# Patient Record
Sex: MALE | Race: OTHER | Employment: OTHER | URBAN - METROPOLITAN AREA
[De-identification: names, ages, dates, MRNs, and addresses within clinical notes are randomized per-mention and may not be internally consistent; named-entity substitution may affect disease eponyms.]

---

## 2017-02-13 ENCOUNTER — OFFICE VISIT (OUTPATIENT)
Dept: FAMILY MEDICINE CLINIC | Age: 52
End: 2017-02-13

## 2017-02-13 VITALS
BODY MASS INDEX: 32.33 KG/M2 | WEIGHT: 201.2 LBS | SYSTOLIC BLOOD PRESSURE: 118 MMHG | HEART RATE: 68 BPM | RESPIRATION RATE: 18 BRPM | DIASTOLIC BLOOD PRESSURE: 72 MMHG | HEIGHT: 66 IN

## 2017-02-13 DIAGNOSIS — F17.210 CIGARETTE SMOKER: ICD-10-CM

## 2017-02-13 DIAGNOSIS — E78.00 PURE HYPERCHOLESTEROLEMIA: ICD-10-CM

## 2017-02-13 DIAGNOSIS — I10 ESSENTIAL HYPERTENSION: ICD-10-CM

## 2017-02-13 LAB
ANION GAP SERPL CALCULATED.3IONS-SCNC: 15 MMOL/L (ref 3–16)
BUN BLDV-MCNC: 17 MG/DL (ref 7–20)
CALCIUM SERPL-MCNC: 9.6 MG/DL (ref 8.3–10.6)
CHLORIDE BLD-SCNC: 101 MMOL/L (ref 99–110)
CO2: 25 MMOL/L (ref 21–32)
CREAT SERPL-MCNC: 0.8 MG/DL (ref 0.9–1.3)
GFR AFRICAN AMERICAN: >60
GFR NON-AFRICAN AMERICAN: >60
GLUCOSE BLD-MCNC: 172 MG/DL (ref 70–99)
POTASSIUM SERPL-SCNC: 4.1 MMOL/L (ref 3.5–5.1)
SODIUM BLD-SCNC: 141 MMOL/L (ref 136–145)

## 2017-02-13 PROCEDURE — 99214 OFFICE O/P EST MOD 30 MIN: CPT | Performed by: INTERNAL MEDICINE

## 2017-02-13 PROCEDURE — 36415 COLL VENOUS BLD VENIPUNCTURE: CPT | Performed by: INTERNAL MEDICINE

## 2017-02-13 RX ORDER — FENOFIBRATE 160 MG/1
160 TABLET ORAL DAILY
Qty: 30 TABLET | Refills: 5 | Status: SHIPPED | OUTPATIENT
Start: 2017-02-13 | End: 2017-06-09 | Stop reason: SDUPTHER

## 2017-02-13 RX ORDER — LISINOPRIL 5 MG/1
TABLET ORAL
Qty: 30 TABLET | Refills: 5 | Status: SHIPPED | OUTPATIENT
Start: 2017-02-13 | End: 2017-06-09 | Stop reason: SDUPTHER

## 2017-02-13 RX ORDER — METOPROLOL TARTRATE 50 MG/1
50 TABLET, FILM COATED ORAL 2 TIMES DAILY
Qty: 60 TABLET | Refills: 5 | Status: SHIPPED | OUTPATIENT
Start: 2017-02-13 | End: 2017-03-27 | Stop reason: SDUPTHER

## 2017-02-13 RX ORDER — GABAPENTIN 400 MG/1
CAPSULE ORAL
Qty: 120 CAPSULE | Refills: 5 | Status: SHIPPED | OUTPATIENT
Start: 2017-02-13 | End: 2017-05-16 | Stop reason: SDUPTHER

## 2017-02-13 RX ORDER — ALBUTEROL SULFATE 90 UG/1
2 AEROSOL, METERED RESPIRATORY (INHALATION) EVERY 6 HOURS PRN
Qty: 1 INHALER | Refills: 3 | Status: SHIPPED | OUTPATIENT
Start: 2017-02-13 | End: 2017-06-13 | Stop reason: SDUPTHER

## 2017-02-13 ASSESSMENT — ENCOUNTER SYMPTOMS
RESPIRATORY NEGATIVE: 1
GASTROINTESTINAL NEGATIVE: 1
ALLERGIC/IMMUNOLOGIC NEGATIVE: 1

## 2017-02-14 LAB
ESTIMATED AVERAGE GLUCOSE: 182.9 MG/DL
HBA1C MFR BLD: 8 %

## 2017-02-16 RX ORDER — PIOGLITAZONEHYDROCHLORIDE 30 MG/1
30 TABLET ORAL DAILY
Qty: 30 TABLET | Refills: 3 | Status: SHIPPED | OUTPATIENT
Start: 2017-02-16 | End: 2017-06-09 | Stop reason: SDUPTHER

## 2017-03-27 RX ORDER — LANCETS 30 GAUGE
EACH MISCELLANEOUS
Qty: 100 EACH | Refills: 3 | Status: SHIPPED | OUTPATIENT
Start: 2017-03-27 | End: 2017-06-13 | Stop reason: SDUPTHER

## 2017-03-27 RX ORDER — METOPROLOL TARTRATE 50 MG/1
50 TABLET, FILM COATED ORAL 2 TIMES DAILY
Qty: 60 TABLET | Refills: 5 | Status: SHIPPED | OUTPATIENT
Start: 2017-03-27 | End: 2017-06-09 | Stop reason: SDUPTHER

## 2017-04-17 RX ORDER — FLUTICASONE PROPIONATE 50 MCG
1 SPRAY, SUSPENSION (ML) NASAL DAILY
Qty: 1 BOTTLE | Refills: 3 | Status: SHIPPED | OUTPATIENT
Start: 2017-04-17 | End: 2017-06-13 | Stop reason: SDUPTHER

## 2017-05-16 ENCOUNTER — OFFICE VISIT (OUTPATIENT)
Dept: FAMILY MEDICINE CLINIC | Age: 52
End: 2017-05-16

## 2017-05-16 VITALS
HEART RATE: 68 BPM | DIASTOLIC BLOOD PRESSURE: 74 MMHG | HEIGHT: 60 IN | SYSTOLIC BLOOD PRESSURE: 112 MMHG | WEIGHT: 212.6 LBS | RESPIRATION RATE: 16 BRPM | BODY MASS INDEX: 41.74 KG/M2

## 2017-05-16 DIAGNOSIS — E66.01 MORBID OBESITY DUE TO EXCESS CALORIES (HCC): ICD-10-CM

## 2017-05-16 DIAGNOSIS — F17.210 CIGARETTE SMOKER: ICD-10-CM

## 2017-05-16 DIAGNOSIS — I10 ESSENTIAL HYPERTENSION: ICD-10-CM

## 2017-05-16 DIAGNOSIS — G89.29 CHRONIC ABDOMINAL PAIN: ICD-10-CM

## 2017-05-16 DIAGNOSIS — R10.9 CHRONIC ABDOMINAL PAIN: ICD-10-CM

## 2017-05-16 DIAGNOSIS — J45.41 MODERATE PERSISTENT ASTHMA WITH ACUTE EXACERBATION: ICD-10-CM

## 2017-05-16 DIAGNOSIS — E78.00 PURE HYPERCHOLESTEROLEMIA: ICD-10-CM

## 2017-05-16 LAB
ALBUMIN SERPL-MCNC: 4.4 G/DL (ref 3.4–5)
ALP BLD-CCNC: 62 U/L (ref 40–129)
ALT SERPL-CCNC: 17 U/L (ref 10–40)
ANION GAP SERPL CALCULATED.3IONS-SCNC: 16 MMOL/L (ref 3–16)
AST SERPL-CCNC: 15 U/L (ref 15–37)
BASOPHILS ABSOLUTE: 0.1 K/UL (ref 0–0.2)
BASOPHILS RELATIVE PERCENT: 0.8 %
BILIRUB SERPL-MCNC: 0.5 MG/DL (ref 0–1)
BILIRUBIN DIRECT: <0.2 MG/DL (ref 0–0.3)
BILIRUBIN, INDIRECT: NORMAL MG/DL (ref 0–1)
BUN BLDV-MCNC: 18 MG/DL (ref 7–20)
C-REACTIVE PROTEIN: 2.3 MG/L (ref 0–5.1)
CALCIUM SERPL-MCNC: 9.4 MG/DL (ref 8.3–10.6)
CHLORIDE BLD-SCNC: 99 MMOL/L (ref 99–110)
CO2: 28 MMOL/L (ref 21–32)
CREAT SERPL-MCNC: 0.9 MG/DL (ref 0.9–1.3)
EOSINOPHILS ABSOLUTE: 0.1 K/UL (ref 0–0.6)
EOSINOPHILS RELATIVE PERCENT: 1.5 %
GFR AFRICAN AMERICAN: >60
GFR NON-AFRICAN AMERICAN: >60
GLUCOSE BLD-MCNC: 62 MG/DL (ref 70–99)
HCT VFR BLD CALC: 46.5 % (ref 40.5–52.5)
HEMOGLOBIN: 15.3 G/DL (ref 13.5–17.5)
LYMPHOCYTES ABSOLUTE: 3 K/UL (ref 1–5.1)
LYMPHOCYTES RELATIVE PERCENT: 31 %
MCH RBC QN AUTO: 29.9 PG (ref 26–34)
MCHC RBC AUTO-ENTMCNC: 32.9 G/DL (ref 31–36)
MCV RBC AUTO: 90.8 FL (ref 80–100)
MONOCYTES ABSOLUTE: 0.5 K/UL (ref 0–1.3)
MONOCYTES RELATIVE PERCENT: 5.4 %
NEUTROPHILS ABSOLUTE: 6 K/UL (ref 1.7–7.7)
NEUTROPHILS RELATIVE PERCENT: 61.3 %
PDW BLD-RTO: 12.4 % (ref 12.4–15.4)
PLATELET # BLD: 215 K/UL (ref 135–450)
PMV BLD AUTO: 10.1 FL (ref 5–10.5)
POTASSIUM SERPL-SCNC: 4.2 MMOL/L (ref 3.5–5.1)
RBC # BLD: 5.12 M/UL (ref 4.2–5.9)
SEDIMENTATION RATE, ERYTHROCYTE: 0 MM/HR (ref 0–20)
SODIUM BLD-SCNC: 143 MMOL/L (ref 136–145)
TOTAL PROTEIN: 6.6 G/DL (ref 6.4–8.2)
WBC # BLD: 9.8 K/UL (ref 4–11)

## 2017-05-16 PROCEDURE — 36415 COLL VENOUS BLD VENIPUNCTURE: CPT | Performed by: INTERNAL MEDICINE

## 2017-05-16 PROCEDURE — 99214 OFFICE O/P EST MOD 30 MIN: CPT | Performed by: INTERNAL MEDICINE

## 2017-05-16 RX ORDER — INSULIN GLARGINE 100 [IU]/ML
INJECTION, SOLUTION SUBCUTANEOUS
Qty: 15 PEN | Refills: 2 | Status: SHIPPED | OUTPATIENT
Start: 2017-05-16 | End: 2017-06-13 | Stop reason: SDUPTHER

## 2017-05-16 RX ORDER — HYDROCODONE BITARTRATE AND ACETAMINOPHEN 5; 325 MG/1; MG/1
TABLET ORAL
COMMUNITY
Start: 2017-05-06 | End: 2017-06-08 | Stop reason: ALTCHOICE

## 2017-05-16 RX ORDER — BUDESONIDE AND FORMOTEROL FUMARATE DIHYDRATE 80; 4.5 UG/1; UG/1
2 AEROSOL RESPIRATORY (INHALATION) 2 TIMES DAILY
Qty: 1 INHALER | Refills: 3 | Status: SHIPPED | OUTPATIENT
Start: 2017-05-16 | End: 2017-06-09 | Stop reason: SDUPTHER

## 2017-05-16 RX ORDER — GABAPENTIN 400 MG/1
CAPSULE ORAL
Qty: 120 CAPSULE | Refills: 5 | Status: SHIPPED | OUTPATIENT
Start: 2017-05-16 | End: 2017-06-09 | Stop reason: SDUPTHER

## 2017-05-16 RX ORDER — METHOCARBAMOL 500 MG/1
TABLET, FILM COATED ORAL
COMMUNITY
Start: 2017-05-06 | End: 2017-06-13 | Stop reason: ALTCHOICE

## 2017-05-16 ASSESSMENT — PATIENT HEALTH QUESTIONNAIRE - PHQ9
1. LITTLE INTEREST OR PLEASURE IN DOING THINGS: 0
2. FEELING DOWN, DEPRESSED OR HOPELESS: 1
SUM OF ALL RESPONSES TO PHQ9 QUESTIONS 1 & 2: 1
SUM OF ALL RESPONSES TO PHQ QUESTIONS 1-9: 1

## 2017-05-16 ASSESSMENT — ENCOUNTER SYMPTOMS
DIARRHEA: 0
ABDOMINAL DISTENTION: 0
ANAL BLEEDING: 0
CONSTIPATION: 0
BLOOD IN STOOL: 0
ABDOMINAL PAIN: 1
RESPIRATORY NEGATIVE: 1
VOMITING: 0
RECTAL PAIN: 0
ALLERGIC/IMMUNOLOGIC NEGATIVE: 1
NAUSEA: 0

## 2017-05-17 ENCOUNTER — HOSPITAL ENCOUNTER (OUTPATIENT)
Dept: ULTRASOUND IMAGING | Age: 52
Discharge: HOME | End: 2017-05-17
Attending: INTERNAL MEDICINE | Admitting: INTERNAL MEDICINE

## 2017-05-17 DIAGNOSIS — R10.9 CHRONIC ABDOMINAL PAIN: ICD-10-CM

## 2017-05-17 DIAGNOSIS — G89.29 CHRONIC ABDOMINAL PAIN: ICD-10-CM

## 2017-05-17 DIAGNOSIS — R10.9 ABDOMINAL PAIN: ICD-10-CM

## 2017-05-17 LAB
ESTIMATED AVERAGE GLUCOSE: 148.5 MG/DL
HBA1C MFR BLD: 6.8 %

## 2017-05-18 ENCOUNTER — TELEPHONE (OUTPATIENT)
Dept: FAMILY MEDICINE CLINIC | Age: 52
End: 2017-05-18

## 2017-05-18 DIAGNOSIS — R10.9 CHRONIC ABDOMINAL PAIN: Primary | ICD-10-CM

## 2017-05-18 DIAGNOSIS — G89.29 CHRONIC ABDOMINAL PAIN: Primary | ICD-10-CM

## 2017-06-08 ENCOUNTER — HOSPITAL ENCOUNTER (OUTPATIENT)
Dept: SURGERY | Age: 52
Discharge: HOME | End: 2017-06-08
Attending: INTERNAL MEDICINE | Admitting: INTERNAL MEDICINE

## 2017-06-08 VITALS
TEMPERATURE: 97.9 F | OXYGEN SATURATION: 96 % | SYSTOLIC BLOOD PRESSURE: 125 MMHG | HEART RATE: 81 BPM | WEIGHT: 200 LBS | RESPIRATION RATE: 16 BRPM | HEIGHT: 62 IN | DIASTOLIC BLOOD PRESSURE: 47 MMHG | BODY MASS INDEX: 36.8 KG/M2

## 2017-06-08 LAB
GLUCOSE BLD-MCNC: 94 MG/DL (ref 70–99)
PERFORMED ON: NORMAL

## 2017-06-08 RX ORDER — LIDOCAINE HYDROCHLORIDE 10 MG/ML
0.1 INJECTION, SOLUTION EPIDURAL; INFILTRATION; INTRACAUDAL; PERINEURAL
Status: ACTIVE | OUTPATIENT
Start: 2017-06-08 | End: 2017-06-08

## 2017-06-08 RX ORDER — SODIUM CHLORIDE, SODIUM LACTATE, POTASSIUM CHLORIDE, CALCIUM CHLORIDE 600; 310; 30; 20 MG/100ML; MG/100ML; MG/100ML; MG/100ML
INJECTION, SOLUTION INTRAVENOUS ONCE
Status: COMPLETED | OUTPATIENT
Start: 2017-06-08 | End: 2017-06-08

## 2017-06-08 RX ADMIN — SODIUM CHLORIDE, SODIUM LACTATE, POTASSIUM CHLORIDE, CALCIUM CHLORIDE: 600; 310; 30; 20 INJECTION, SOLUTION INTRAVENOUS at 08:40

## 2017-06-08 ASSESSMENT — PAIN DESCRIPTION - DESCRIPTORS: DESCRIPTORS: SHARP

## 2017-06-08 ASSESSMENT — PAIN - FUNCTIONAL ASSESSMENT: PAIN_FUNCTIONAL_ASSESSMENT: 0-10

## 2017-06-08 ASSESSMENT — PAIN SCALES - GENERAL
PAINLEVEL_OUTOF10: 0
PAINLEVEL_OUTOF10: 0

## 2017-06-09 RX ORDER — FENOFIBRATE 160 MG/1
160 TABLET ORAL DAILY
Qty: 90 TABLET | Refills: 1 | Status: SHIPPED | OUTPATIENT
Start: 2017-06-09 | End: 2017-11-13 | Stop reason: SDUPTHER

## 2017-06-09 RX ORDER — GABAPENTIN 400 MG/1
CAPSULE ORAL
Qty: 360 CAPSULE | Refills: 1 | Status: SHIPPED | OUTPATIENT
Start: 2017-06-09 | End: 2017-11-13 | Stop reason: SDUPTHER

## 2017-06-09 RX ORDER — METOPROLOL TARTRATE 50 MG/1
50 TABLET, FILM COATED ORAL 2 TIMES DAILY
Qty: 180 TABLET | Refills: 1 | Status: SHIPPED | OUTPATIENT
Start: 2017-06-09 | End: 2017-11-13 | Stop reason: SDUPTHER

## 2017-06-09 RX ORDER — LISINOPRIL 5 MG/1
TABLET ORAL
Qty: 90 TABLET | Refills: 1 | Status: SHIPPED | OUTPATIENT
Start: 2017-06-09 | End: 2017-11-13 | Stop reason: SDUPTHER

## 2017-06-09 RX ORDER — BUDESONIDE AND FORMOTEROL FUMARATE DIHYDRATE 80; 4.5 UG/1; UG/1
2 AEROSOL RESPIRATORY (INHALATION) 2 TIMES DAILY
Qty: 3 INHALER | Refills: 1 | Status: SHIPPED | OUTPATIENT
Start: 2017-06-09 | End: 2017-09-19 | Stop reason: SDUPTHER

## 2017-06-09 RX ORDER — PIOGLITAZONEHYDROCHLORIDE 30 MG/1
30 TABLET ORAL DAILY
Qty: 90 TABLET | Refills: 1 | Status: SHIPPED | OUTPATIENT
Start: 2017-06-09 | End: 2017-11-13 | Stop reason: SDUPTHER

## 2017-06-13 ENCOUNTER — OFFICE VISIT (OUTPATIENT)
Dept: FAMILY MEDICINE CLINIC | Age: 52
End: 2017-06-13

## 2017-06-13 VITALS
HEIGHT: 62 IN | OXYGEN SATURATION: 97 % | BODY MASS INDEX: 38.57 KG/M2 | RESPIRATION RATE: 18 BRPM | SYSTOLIC BLOOD PRESSURE: 118 MMHG | WEIGHT: 209.6 LBS | HEART RATE: 74 BPM | DIASTOLIC BLOOD PRESSURE: 68 MMHG

## 2017-06-13 DIAGNOSIS — G89.29 CHRONIC ABDOMINAL PAIN: ICD-10-CM

## 2017-06-13 DIAGNOSIS — R10.9 CHRONIC ABDOMINAL PAIN: ICD-10-CM

## 2017-06-13 DIAGNOSIS — I10 ESSENTIAL HYPERTENSION: ICD-10-CM

## 2017-06-13 PROCEDURE — 99213 OFFICE O/P EST LOW 20 MIN: CPT | Performed by: INTERNAL MEDICINE

## 2017-06-13 RX ORDER — BUDESONIDE 0.5 MG/2ML
1 INHALANT ORAL 2 TIMES DAILY
Qty: 180 AMPULE | Refills: 3 | Status: SHIPPED | OUTPATIENT
Start: 2017-06-13 | End: 2017-07-21 | Stop reason: SDUPTHER

## 2017-06-13 RX ORDER — FLUTICASONE PROPIONATE 50 MCG
1 SPRAY, SUSPENSION (ML) NASAL DAILY
Qty: 3 BOTTLE | Refills: 3 | Status: SHIPPED | OUTPATIENT
Start: 2017-06-13 | End: 2017-08-23 | Stop reason: SDUPTHER

## 2017-06-13 RX ORDER — LANCETS 30 GAUGE
EACH MISCELLANEOUS
Qty: 100 EACH | Refills: 3 | Status: SHIPPED | OUTPATIENT
Start: 2017-06-13 | End: 2017-09-12 | Stop reason: SDUPTHER

## 2017-06-13 RX ORDER — ALBUTEROL SULFATE 90 UG/1
2 AEROSOL, METERED RESPIRATORY (INHALATION) EVERY 6 HOURS PRN
Qty: 1 INHALER | Refills: 3 | Status: SHIPPED | OUTPATIENT
Start: 2017-06-13 | End: 2017-09-12 | Stop reason: SDUPTHER

## 2017-06-13 ASSESSMENT — ENCOUNTER SYMPTOMS
RECTAL PAIN: 0
ABDOMINAL DISTENTION: 0
DIARRHEA: 0
BLOOD IN STOOL: 0
ABDOMINAL PAIN: 1
NAUSEA: 0
ANAL BLEEDING: 0
ALLERGIC/IMMUNOLOGIC NEGATIVE: 1
CONSTIPATION: 0
RESPIRATORY NEGATIVE: 1
VOMITING: 0

## 2017-06-16 RX ORDER — PIOGLITAZONEHYDROCHLORIDE 30 MG/1
TABLET ORAL
Qty: 30 TABLET | Refills: 5 | Status: SHIPPED | OUTPATIENT
Start: 2017-06-16 | End: 2017-08-23 | Stop reason: SDUPTHER

## 2017-07-21 RX ORDER — BUDESONIDE 0.5 MG/2ML
1 INHALANT ORAL 2 TIMES DAILY
Qty: 180 AMPULE | Refills: 3 | Status: SHIPPED | OUTPATIENT
Start: 2017-07-21 | End: 2019-02-05 | Stop reason: SDUPTHER

## 2017-08-23 ENCOUNTER — TELEPHONE (OUTPATIENT)
Dept: FAMILY MEDICINE CLINIC | Age: 52
End: 2017-08-23

## 2017-08-23 ENCOUNTER — OFFICE VISIT (OUTPATIENT)
Dept: FAMILY MEDICINE CLINIC | Age: 52
End: 2017-08-23

## 2017-08-23 VITALS
DIASTOLIC BLOOD PRESSURE: 62 MMHG | TEMPERATURE: 98.4 F | SYSTOLIC BLOOD PRESSURE: 104 MMHG | OXYGEN SATURATION: 98 % | WEIGHT: 218.4 LBS | HEIGHT: 62 IN | HEART RATE: 74 BPM | BODY MASS INDEX: 40.19 KG/M2

## 2017-08-23 DIAGNOSIS — J30.89 NON-SEASONAL ALLERGIC RHINITIS, UNSPECIFIED ALLERGIC RHINITIS TRIGGER: ICD-10-CM

## 2017-08-23 DIAGNOSIS — J45.41 MODERATE PERSISTENT ASTHMA WITH ACUTE EXACERBATION: ICD-10-CM

## 2017-08-23 DIAGNOSIS — J01.90 ACUTE BACTERIAL SINUSITIS: Primary | ICD-10-CM

## 2017-08-23 DIAGNOSIS — B96.89 ACUTE BACTERIAL SINUSITIS: Primary | ICD-10-CM

## 2017-08-23 PROCEDURE — 99214 OFFICE O/P EST MOD 30 MIN: CPT | Performed by: PHYSICIAN ASSISTANT

## 2017-08-23 RX ORDER — BUDESONIDE 0.5 MG/2ML
1 INHALANT ORAL 2 TIMES DAILY
Qty: 180 AMPULE | Refills: 3 | Status: SHIPPED | OUTPATIENT
Start: 2017-08-23 | End: 2017-09-19 | Stop reason: ALTCHOICE

## 2017-08-23 RX ORDER — FLUTICASONE PROPIONATE 50 MCG
1 SPRAY, SUSPENSION (ML) NASAL DAILY
Qty: 3 BOTTLE | Refills: 3 | Status: SHIPPED | OUTPATIENT
Start: 2017-08-23 | End: 2019-02-05 | Stop reason: SDUPTHER

## 2017-08-23 RX ORDER — BUDESONIDE AND FORMOTEROL FUMARATE DIHYDRATE 80; 4.5 UG/1; UG/1
2 AEROSOL RESPIRATORY (INHALATION) 2 TIMES DAILY
Qty: 1 INHALER | Refills: 3 | Status: SHIPPED | OUTPATIENT
Start: 2017-08-23 | End: 2017-11-13 | Stop reason: SDUPTHER

## 2017-08-23 RX ORDER — FEXOFENADINE HCL AND PSEUDOEPHEDRINE HCI 60; 120 MG/1; MG/1
1 TABLET, EXTENDED RELEASE ORAL 2 TIMES DAILY PRN
Qty: 30 TABLET | Refills: 0 | Status: SHIPPED | OUTPATIENT
Start: 2017-08-23 | End: 2019-05-21 | Stop reason: ALTCHOICE

## 2017-08-23 RX ORDER — BUDESONIDE AND FORMOTEROL FUMARATE DIHYDRATE 80; 4.5 UG/1; UG/1
2 AEROSOL RESPIRATORY (INHALATION) 2 TIMES DAILY
Qty: 1 INHALER | Refills: 3 | Status: SHIPPED | OUTPATIENT
Start: 2017-08-23 | End: 2017-09-19 | Stop reason: SDUPTHER

## 2017-08-23 RX ORDER — AZITHROMYCIN 250 MG/1
TABLET, FILM COATED ORAL
Qty: 1 PACKET | Refills: 0 | Status: SHIPPED | OUTPATIENT
Start: 2017-08-23 | End: 2017-08-30 | Stop reason: ALTCHOICE

## 2017-08-23 NOTE — TELEPHONE ENCOUNTER
I already called pharmacy to cancel Pulmocort and called in script for Symbicort.      Please sign off on script

## 2017-08-23 NOTE — TELEPHONE ENCOUNTER
Pulmocort should have been the SYmbicort 80-4.5. Can you delete the Pulmicort? Then call Emmie with update.  Thanks,

## 2017-08-23 NOTE — TELEPHONE ENCOUNTER
[facility]. is calling about the patients Pulmicort prescription. They medication is not covered by his insurance company so the patient asked Nevaeh Garcia to call to see if Kenneth Mcghee wanted to switch it to an alternate medication.

## 2017-08-25 ENCOUNTER — TELEPHONE (OUTPATIENT)
Dept: PULMONOLOGY | Age: 52
End: 2017-08-25

## 2017-08-30 ENCOUNTER — OFFICE VISIT (OUTPATIENT)
Dept: PULMONOLOGY | Age: 52
End: 2017-08-30

## 2017-08-30 VITALS
WEIGHT: 220 LBS | SYSTOLIC BLOOD PRESSURE: 104 MMHG | DIASTOLIC BLOOD PRESSURE: 62 MMHG | HEIGHT: 64 IN | OXYGEN SATURATION: 96 % | BODY MASS INDEX: 37.56 KG/M2 | RESPIRATION RATE: 16 BRPM | TEMPERATURE: 97 F | HEART RATE: 78 BPM

## 2017-08-30 DIAGNOSIS — F32.A DEPRESSION, UNSPECIFIED DEPRESSION TYPE: ICD-10-CM

## 2017-08-30 DIAGNOSIS — R06.83 SNORING: Primary | ICD-10-CM

## 2017-08-30 DIAGNOSIS — R06.81 WITNESSED APNEIC SPELLS: ICD-10-CM

## 2017-08-30 DIAGNOSIS — E66.9 OBESITY (BMI 30-39.9): ICD-10-CM

## 2017-08-30 DIAGNOSIS — G47.10 HYPERSOMNIA: ICD-10-CM

## 2017-08-30 PROCEDURE — G0444 DEPRESSION SCREEN ANNUAL: HCPCS | Performed by: NURSE PRACTITIONER

## 2017-08-30 PROCEDURE — 99203 OFFICE O/P NEW LOW 30 MIN: CPT | Performed by: NURSE PRACTITIONER

## 2017-08-30 ASSESSMENT — SLEEP AND FATIGUE QUESTIONNAIRES
HOW LIKELY ARE YOU TO NOD OFF OR FALL ASLEEP WHILE SITTING AND READING: 3
HOW LIKELY ARE YOU TO NOD OFF OR FALL ASLEEP WHILE SITTING QUIETLY AFTER LUNCH WITHOUT ALCOHOL: 3
ESS TOTAL SCORE: 21
NECK CIRCUMFERENCE (INCHES): 18.5
HOW LIKELY ARE YOU TO NOD OFF OR FALL ASLEEP WHILE LYING DOWN TO REST IN THE AFTERNOON WHEN CIRCUMSTANCES PERMIT: 3
HOW LIKELY ARE YOU TO NOD OFF OR FALL ASLEEP WHILE SITTING INACTIVE IN A PUBLIC PLACE: 2
HOW LIKELY ARE YOU TO NOD OFF OR FALL ASLEEP IN A CAR, WHILE STOPPED FOR A FEW MINUTES IN TRAFFIC: 3
HOW LIKELY ARE YOU TO NOD OFF OR FALL ASLEEP WHILE SITTING AND TALKING TO SOMEONE: 2
HOW LIKELY ARE YOU TO NOD OFF OR FALL ASLEEP WHEN YOU ARE A PASSENGER IN A CAR FOR AN HOUR WITHOUT A BREAK: 3
HOW LIKELY ARE YOU TO NOD OFF OR FALL ASLEEP WHILE WATCHING TV: 2

## 2017-08-30 ASSESSMENT — PATIENT HEALTH QUESTIONNAIRE - PHQ9
9. THOUGHTS THAT YOU WOULD BE BETTER OFF DEAD, OR OF HURTING YOURSELF: 1
SUM OF ALL RESPONSES TO PHQ QUESTIONS 1-9: 13
7. TROUBLE CONCENTRATING ON THINGS, SUCH AS READING THE NEWSPAPER OR WATCHING TELEVISION: 1
4. FEELING TIRED OR HAVING LITTLE ENERGY: 3
5. POOR APPETITE OR OVEREATING: 1
6. FEELING BAD ABOUT YOURSELF - OR THAT YOU ARE A FAILURE OR HAVE LET YOURSELF OR YOUR FAMILY DOWN: 1
10. IF YOU CHECKED OFF ANY PROBLEMS, HOW DIFFICULT HAVE THESE PROBLEMS MADE IT FOR YOU TO DO YOUR WORK, TAKE CARE OF THINGS AT HOME, OR GET ALONG WITH OTHER PEOPLE: 2
8. MOVING OR SPEAKING SO SLOWLY THAT OTHER PEOPLE COULD HAVE NOTICED. OR THE OPPOSITE, BEING SO FIGETY OR RESTLESS THAT YOU HAVE BEEN MOVING AROUND A LOT MORE THAN USUAL: 3
3. TROUBLE FALLING OR STAYING ASLEEP: 3

## 2017-09-12 RX ORDER — GLUCOSAM/CHON-MSM1/C/MANG/BOSW 500-416.6
TABLET ORAL
Qty: 100 EACH | Refills: 3 | Status: SHIPPED | OUTPATIENT
Start: 2017-09-12 | End: 2017-09-19 | Stop reason: SDUPTHER

## 2017-09-19 ENCOUNTER — OFFICE VISIT (OUTPATIENT)
Dept: FAMILY MEDICINE CLINIC | Age: 52
End: 2017-09-19

## 2017-09-19 VITALS
DIASTOLIC BLOOD PRESSURE: 78 MMHG | RESPIRATION RATE: 16 BRPM | WEIGHT: 220.8 LBS | HEIGHT: 64 IN | OXYGEN SATURATION: 96 % | SYSTOLIC BLOOD PRESSURE: 132 MMHG | HEART RATE: 69 BPM | BODY MASS INDEX: 37.69 KG/M2

## 2017-09-19 DIAGNOSIS — G47.33 OBSTRUCTIVE SLEEP APNEA SYNDROME: ICD-10-CM

## 2017-09-19 DIAGNOSIS — J45.41 MODERATE PERSISTENT ASTHMA WITH ACUTE EXACERBATION: ICD-10-CM

## 2017-09-19 DIAGNOSIS — F17.210 CIGARETTE SMOKER: ICD-10-CM

## 2017-09-19 DIAGNOSIS — I10 ESSENTIAL HYPERTENSION: ICD-10-CM

## 2017-09-19 DIAGNOSIS — E66.9 OBESITY (BMI 30-39.9): ICD-10-CM

## 2017-09-19 PROCEDURE — 36415 COLL VENOUS BLD VENIPUNCTURE: CPT | Performed by: INTERNAL MEDICINE

## 2017-09-19 PROCEDURE — 99214 OFFICE O/P EST MOD 30 MIN: CPT | Performed by: INTERNAL MEDICINE

## 2017-09-19 RX ORDER — GLUCOSAM/CHON-MSM1/C/MANG/BOSW 500-416.6
TABLET ORAL
Qty: 100 EACH | Refills: 3 | Status: SHIPPED | OUTPATIENT
Start: 2017-09-19 | End: 2018-09-13 | Stop reason: SDUPTHER

## 2017-09-19 RX ORDER — ALBUTEROL SULFATE 90 UG/1
2 AEROSOL, METERED RESPIRATORY (INHALATION) EVERY 6 HOURS PRN
Qty: 18 G | Refills: 3 | Status: SHIPPED | OUTPATIENT
Start: 2017-09-19 | End: 2018-05-10 | Stop reason: SDUPTHER

## 2017-09-19 ASSESSMENT — ENCOUNTER SYMPTOMS
WHEEZING: 1
GASTROINTESTINAL NEGATIVE: 1
ALLERGIC/IMMUNOLOGIC NEGATIVE: 1
COUGH: 1
RHINORRHEA: 1

## 2017-09-20 LAB
ANION GAP SERPL CALCULATED.3IONS-SCNC: 16 MMOL/L (ref 3–16)
BUN BLDV-MCNC: 19 MG/DL (ref 7–20)
CALCIUM SERPL-MCNC: 9.9 MG/DL (ref 8.3–10.6)
CHLORIDE BLD-SCNC: 102 MMOL/L (ref 99–110)
CO2: 26 MMOL/L (ref 21–32)
CREAT SERPL-MCNC: 0.9 MG/DL (ref 0.9–1.3)
ESTIMATED AVERAGE GLUCOSE: 137 MG/DL
GFR AFRICAN AMERICAN: >60
GFR NON-AFRICAN AMERICAN: >60
GLUCOSE BLD-MCNC: 79 MG/DL (ref 70–99)
HBA1C MFR BLD: 6.4 %
POTASSIUM SERPL-SCNC: 4.1 MMOL/L (ref 3.5–5.1)
SODIUM BLD-SCNC: 144 MMOL/L (ref 136–145)

## 2017-10-25 ENCOUNTER — HOSPITAL ENCOUNTER (OUTPATIENT)
Dept: SLEEP MEDICINE | Age: 52
Discharge: HOME | End: 2017-10-27
Attending: NURSE PRACTITIONER | Admitting: NURSE PRACTITIONER

## 2017-10-25 DIAGNOSIS — R06.83 SNORING: ICD-10-CM

## 2017-10-25 DIAGNOSIS — R06.81 WITNESSED APNEIC SPELLS: ICD-10-CM

## 2017-10-25 DIAGNOSIS — G47.10 HYPERSOMNIA: ICD-10-CM

## 2017-10-25 DIAGNOSIS — E66.9 OBESITY (BMI 30-39.9): ICD-10-CM

## 2017-10-27 ENCOUNTER — TELEPHONE (OUTPATIENT)
Dept: PULMONOLOGY | Age: 52
End: 2017-10-27

## 2017-10-27 DIAGNOSIS — G47.33 OSA (OBSTRUCTIVE SLEEP APNEA): Primary | ICD-10-CM

## 2017-10-27 NOTE — TELEPHONE ENCOUNTER
Patient to use [facility]. HOSP. AND [place] TREATMENT. Phone number [phone]; Fax [phone]. Orders pending.

## 2017-10-31 ENCOUNTER — TELEPHONE (OUTPATIENT)
Dept: PULMONOLOGY | Age: 52
End: 2017-10-31

## 2017-10-31 NOTE — TELEPHONE ENCOUNTER
Pt called stating that he called [facility] and they do not carry CPAP. Pt called his insurance back and would like to have orders faxed to [facility] or [facility] [phone].     Orders faxed to [facility] per pt request.

## 2017-11-13 RX ORDER — PIOGLITAZONEHYDROCHLORIDE 30 MG/1
30 TABLET ORAL DAILY
Qty: 90 TABLET | Refills: 1 | Status: SHIPPED | OUTPATIENT
Start: 2017-11-13 | End: 2017-12-07 | Stop reason: SDUPTHER

## 2017-11-13 RX ORDER — LISINOPRIL 5 MG/1
TABLET ORAL
Qty: 90 TABLET | Refills: 1 | Status: SHIPPED | OUTPATIENT
Start: 2017-11-13 | End: 2017-12-07 | Stop reason: SDUPTHER

## 2017-11-13 RX ORDER — FENOFIBRATE 160 MG/1
160 TABLET ORAL DAILY
Qty: 90 TABLET | Refills: 1 | Status: SHIPPED | OUTPATIENT
Start: 2017-11-13 | End: 2018-02-13 | Stop reason: SDUPTHER

## 2017-11-13 RX ORDER — GABAPENTIN 400 MG/1
CAPSULE ORAL
Qty: 360 CAPSULE | Refills: 1 | Status: SHIPPED | OUTPATIENT
Start: 2017-11-13 | End: 2017-12-07 | Stop reason: SDUPTHER

## 2017-11-13 RX ORDER — BUDESONIDE AND FORMOTEROL FUMARATE DIHYDRATE 80; 4.5 UG/1; UG/1
2 AEROSOL RESPIRATORY (INHALATION) 2 TIMES DAILY
Qty: 1 INHALER | Refills: 3 | Status: SHIPPED | OUTPATIENT
Start: 2017-11-13 | End: 2018-03-13 | Stop reason: SDUPTHER

## 2017-11-13 RX ORDER — METOPROLOL TARTRATE 50 MG/1
50 TABLET, FILM COATED ORAL 2 TIMES DAILY
Qty: 180 TABLET | Refills: 1 | Status: SHIPPED | OUTPATIENT
Start: 2017-11-13 | End: 2018-05-24 | Stop reason: SDUPTHER

## 2017-11-13 NOTE — TELEPHONE ENCOUNTER
Patient received correspondence from [facility] that his Request for refills was denied - 7 medications - Was unsure why it was denied        Please advise   [phone]

## 2017-11-14 RX ORDER — LISINOPRIL 5 MG/1
TABLET ORAL
Qty: 90 TABLET | Refills: 11 | Status: SHIPPED | OUTPATIENT
Start: 2017-11-14 | End: 2018-11-05 | Stop reason: SDUPTHER

## 2017-11-14 RX ORDER — PIOGLITAZONEHYDROCHLORIDE 30 MG/1
TABLET ORAL
Qty: 90 TABLET | Refills: 11 | Status: SHIPPED | OUTPATIENT
Start: 2017-11-14 | End: 2018-11-05 | Stop reason: SDUPTHER

## 2017-11-14 RX ORDER — FENOFIBRATE 160 MG/1
TABLET ORAL
Qty: 90 TABLET | Refills: 11 | Status: SHIPPED | OUTPATIENT
Start: 2017-11-14 | End: 2018-11-05 | Stop reason: SDUPTHER

## 2017-11-14 RX ORDER — DILTIAZEM HYDROCHLORIDE 60 MG/1
TABLET, FILM COATED ORAL
Qty: 30.6 G | Refills: 11 | Status: SHIPPED | OUTPATIENT
Start: 2017-11-14 | End: 2018-11-27 | Stop reason: SDUPTHER

## 2017-11-14 RX ORDER — GABAPENTIN 400 MG/1
CAPSULE ORAL
Qty: 360 CAPSULE | Refills: 11 | Status: SHIPPED | OUTPATIENT
Start: 2017-11-14 | End: 2018-11-05 | Stop reason: SDUPTHER

## 2017-11-14 RX ORDER — INSULIN GLARGINE 100 [IU]/ML
INJECTION, SOLUTION SUBCUTANEOUS
Qty: 15 ML | Refills: 11 | Status: SHIPPED | OUTPATIENT
Start: 2017-11-14 | End: 2018-07-02 | Stop reason: SDUPTHER

## 2017-11-14 RX ORDER — METOPROLOL TARTRATE 50 MG/1
TABLET, FILM COATED ORAL
Qty: 180 TABLET | Refills: 11 | Status: SHIPPED | OUTPATIENT
Start: 2017-11-14 | End: 2017-12-07 | Stop reason: SDUPTHER

## 2017-12-07 ENCOUNTER — OFFICE VISIT (OUTPATIENT)
Dept: PULMONOLOGY | Age: 52
End: 2017-12-07

## 2017-12-07 VITALS
RESPIRATION RATE: 16 BRPM | SYSTOLIC BLOOD PRESSURE: 115 MMHG | HEART RATE: 68 BPM | WEIGHT: 229 LBS | TEMPERATURE: 99 F | BODY MASS INDEX: 39.09 KG/M2 | HEIGHT: 64 IN | OXYGEN SATURATION: 97 % | DIASTOLIC BLOOD PRESSURE: 68 MMHG

## 2017-12-07 DIAGNOSIS — E66.9 OBESITY (BMI 30-39.9): ICD-10-CM

## 2017-12-07 DIAGNOSIS — Z71.89 ENCOUNTER FOR BIPAP USE COUNSELING: ICD-10-CM

## 2017-12-07 DIAGNOSIS — G47.33 OBSTRUCTIVE SLEEP APNEA SYNDROME: Primary | ICD-10-CM

## 2017-12-07 PROCEDURE — 99213 OFFICE O/P EST LOW 20 MIN: CPT | Performed by: NURSE PRACTITIONER

## 2017-12-07 PROCEDURE — G8427 DOCREV CUR MEDS BY ELIG CLIN: HCPCS | Performed by: NURSE PRACTITIONER

## 2017-12-07 PROCEDURE — G8484 FLU IMMUNIZE NO ADMIN: HCPCS | Performed by: NURSE PRACTITIONER

## 2017-12-07 PROCEDURE — 3017F COLORECTAL CA SCREEN DOC REV: CPT | Performed by: NURSE PRACTITIONER

## 2017-12-07 PROCEDURE — 4004F PT TOBACCO SCREEN RCVD TLK: CPT | Performed by: NURSE PRACTITIONER

## 2017-12-07 PROCEDURE — G8417 CALC BMI ABV UP PARAM F/U: HCPCS | Performed by: NURSE PRACTITIONER

## 2017-12-07 ASSESSMENT — SLEEP AND FATIGUE QUESTIONNAIRES
HOW LIKELY ARE YOU TO NOD OFF OR FALL ASLEEP WHEN YOU ARE A PASSENGER IN A CAR FOR AN HOUR WITHOUT A BREAK: 0
ESS TOTAL SCORE: 2
HOW LIKELY ARE YOU TO NOD OFF OR FALL ASLEEP WHILE SITTING AND TALKING TO SOMEONE: 0
HOW LIKELY ARE YOU TO NOD OFF OR FALL ASLEEP WHILE SITTING INACTIVE IN A PUBLIC PLACE: 0
HOW LIKELY ARE YOU TO NOD OFF OR FALL ASLEEP IN A CAR, WHILE STOPPED FOR A FEW MINUTES IN TRAFFIC: 0
HOW LIKELY ARE YOU TO NOD OFF OR FALL ASLEEP WHILE SITTING AND READING: 0
HOW LIKELY ARE YOU TO NOD OFF OR FALL ASLEEP WHILE SITTING QUIETLY AFTER LUNCH WITHOUT ALCOHOL: 0
HOW LIKELY ARE YOU TO NOD OFF OR FALL ASLEEP WHILE LYING DOWN TO REST IN THE AFTERNOON WHEN CIRCUMSTANCES PERMIT: 0
HOW LIKELY ARE YOU TO NOD OFF OR FALL ASLEEP WHILE WATCHING TV: 2
NECK CIRCUMFERENCE (INCHES): 18.25

## 2017-12-07 NOTE — PATIENT INSTRUCTIONS
is a stimulant effect and you will experience fragmented sleep. 6- Take a hot bath 90 minutes before bedtime:  A hot bath will raise your body temperature, but it is the drop in body temperature that may leave you feeling sleepy  7- Develop sleep rituals: it is important to give your body cues that it is time to slow down and sleep. Listen to relaxing music, read something soothing for 15 minutes, have a cup of caffeine free tea, or do relaxation exercises such as yoga or deep breathing help relieve anxiety and reduce muscle tension. 8- Fix a bedtime and an awakening time: Even on weekends! When your sleep cycle has a regular rhythm, you will feel better. 9- Sleep only when sleepy: This reduces the time you are awake in bed. 10- Get into your favorite sleeping position: If you can't fall asleep within 15-30 minutes, get up and do something boring until you feel sleepy. Sit quietly in the dark or read the warranty on your refrigerator. Don't expose yourself to bright light while you are up, it gives cues to your brain that it is time to wake up. 11- Only use your bed for sleeping: Dont use the bed as an office, workroom or recreation room. Let your body \"know\" that the bed is associated with sleeping  12- Use comfortable bedding. Uncomfortable bedding can prevent good sleep. Evaluate whether or not this is a source of your problem, and make appropriate changes. 13- Make sure your bed and bedroom are quiet and comfortable: A hot room can be uncomfortable. A cooler room, along with enough blankets to stay warm is recommended. Get a blackout shade or wear a slumber mask and wear earplugs or get a \"white noise\" machine for light and noise distractions. 14- Use sunlight to set your biological clock: When you get up in the morning, go outside and turn your face to the sun for 15 minutes. 13- Dont take your worries to bed: Leave worries about job, school, daily life, etc., behind when you go to bed.  Some (taken from Up-To-Date)      Cigarette smoking is a preventable cause of death in the [place]. Quitting smoking now can decrease your risk of getting smoking-related illnesses like heart disease, stroke, cancer & COPD. S = Set a quit date. T = Tell family, friends, and the people around you that you plan to quit. A = Anticipate or plan ahead for the tough times you'll face while quitting. R = Remove cigarettes and other tobacco products from your home, car, and work. T = Talk to your doctor about getting help to quit. Your doctor may give you medicines to reduce your craving for cigarettes & the unpleasant symptoms that happen when you stop smoking (called withdrawal symptoms). What are the symptoms of withdrawal?  The symptoms include:   ?Trouble sleeping   ? Being irritable, anxious or restless   ? Getting frustrated or angry   ? Having trouble thinking clearly  Nicotine replacement therapy eases withdrawal and reduces your bodys craving for nicotine, the main drug found in cigarettes. Non-prescription forms of nicotine replacement include skin patches, lozenges, and gum. Two prescription medications are available that have been proven to help people stop smoking:  ? Bupropion is a prescription medicine that reduces your desire to smoke. This medicine is sold under the brand names Zyban and Wellbutrin & as a generic formulation. ? Varenicline (brand name: Chantix) is a prescription medicine that reduces withdrawal symptoms and cigarette cravings. If you take bupropion or varenicline and you have any new or worsening depressed mood or thoughts of harming yourself or someone else, stop taking the medicine and call your doctor. Buproprion should not be taken by anyone with a history of seizure or epilepsy. Will I gain weight if I quit?  Yes, you might gain a few pounds. But quitting smoking will have a much more positive effect on your health than weighing a few pounds more.  Plus, you can help prevent some weight gain by being more active and eating less. Taking the medicine bupropion might help control weight gain. What else can I do to improve my chances of quitting?  You can:  ?Start exercising. ?Stay away from smokers and places that you associate with smoking. If people close to you smoke, ask them to quit with you. ? Keep gum, hard candy, or something to put in your mouth handy. If you get a craving for a cigarette, try one of these instead. ?Dont give up, even if you start smoking again. It takes most people a few tries before they succeed. You can also get help from a free phone line (1-987-QUIT-NOW) or go online to www.smokefree.gov. Your pulmonary team is here to help you quit.   Call for assistance [phone]

## 2017-12-07 NOTE — PROGRESS NOTES
puffs into the lungs 2 times daily, Disp: 1 Inhaler, Rfl: 3    insulin glargine (BASAGLAR KWIKPEN) 100 UNIT/ML injection pen, Inject 50 Units into the skin nightly, Disp: 5 Pen, Rfl: 3    albuterol sulfate HFA (VENTOLIN HFA) 108 (90 Base) MCG/ACT inhaler, Inhale 2 puffs into the lungs every 6 hours as needed for Wheezing, Disp: 18 g, Rfl: 3    TRUEPLUS LANCETS 28G MISC, USE TO TEST BLOOD SUGAR ONCE DAILY *PT USES TRUE METRIX*, Disp: 100 each, Rfl: 3    insulin glargine (LANTUS SOLOSTAR) 100 UNIT/ML injection pen, Inject 50 Units into the skin nightly, Disp: 15 Pen, Rfl: 3    fluticasone (FLONASE) 50 MCG/ACT nasal spray, 1 spray by Nasal route daily, Disp: 3 Bottle, Rfl: 3    fexofenadine-pseudoephedrine (ALLEGRA-D ALLERGY & CONGESTION)  MG per extended release tablet, Take 1 tablet by mouth 2 times daily as needed (allergies and head congestion), Disp: 30 tablet, Rfl: 0    budesonide (PULMICORT) 0.5 MG/2ML nebulizer suspension, Take 2 mLs by nebulization 2 times daily, Disp: 180 ampule, Rfl: 3    Insulin Pen Needle (B-D UF III MINI PEN NEEDLES) 31G X 5 MM MISC, Inject 1 each into the skin daily, Disp: 100 each, Rfl: 3    glucose blood VI test strips (TRUE METRIX BLOOD GLUCOSE TEST) strip, 1 each by In Vitro route daily Dx: E 11.9   As needed. , Disp: 100 each, Rfl: 3    Respiratory Therapy Supplies (NEBULIZER COMPRESSOR) KIT, 1 kit by Does not apply route daily as needed (use as needed) Use with inhalation solution, DX: J45.41, Disp: 1 kit, Rfl: 0      Review of Systems    Objective:   PHYSICAL EXAM:    /68 (Site: Right Arm, Position: Sitting)   Pulse 68   Temp 99 °F (37.2 °C) (Oral)   Resp 16   Ht 5' 4\" (1.626 m)   Wt 229 lb (103.9 kg)   SpO2 97% Comment: RA  BMI 39.31 kg/m²     Physical Exam  Gen: No acute distress. Obese. BMI of 39.31  Eyes: PERRL. No sclera icterus. No conjunctival injection. ENT: No discharge. Pharynx clear. Mallampati class IV. Large tongue with ridging.   Neck:

## 2017-12-12 RX ORDER — PEN NEEDLE, DIABETIC 31 GX3/16"
NEEDLE, DISPOSABLE MISCELLANEOUS
Qty: 100 EACH | Refills: 11 | Status: SHIPPED | OUTPATIENT
Start: 2017-12-12 | End: 2018-12-31 | Stop reason: SDUPTHER

## 2018-01-11 RX ORDER — CALCIUM CITRATE/VITAMIN D3 200MG-6.25
TABLET ORAL
Qty: 100 EACH | Refills: 5 | Status: SHIPPED | OUTPATIENT
Start: 2018-01-11 | End: 2018-10-18 | Stop reason: ALTCHOICE

## 2018-01-16 ENCOUNTER — OFFICE VISIT (OUTPATIENT)
Dept: FAMILY MEDICINE CLINIC | Age: 53
End: 2018-01-16

## 2018-01-16 VITALS
OXYGEN SATURATION: 96 % | HEIGHT: 64 IN | RESPIRATION RATE: 18 BRPM | HEART RATE: 70 BPM | WEIGHT: 228.2 LBS | BODY MASS INDEX: 38.96 KG/M2 | DIASTOLIC BLOOD PRESSURE: 76 MMHG | SYSTOLIC BLOOD PRESSURE: 124 MMHG

## 2018-01-16 DIAGNOSIS — F17.210 CIGARETTE SMOKER: ICD-10-CM

## 2018-01-16 DIAGNOSIS — E78.00 PURE HYPERCHOLESTEROLEMIA: ICD-10-CM

## 2018-01-16 DIAGNOSIS — G47.33 OSA ON CPAP: ICD-10-CM

## 2018-01-16 DIAGNOSIS — I10 ESSENTIAL HYPERTENSION: ICD-10-CM

## 2018-01-16 DIAGNOSIS — Z23 FLU VACCINE NEED: ICD-10-CM

## 2018-01-16 DIAGNOSIS — Z99.89 OSA ON CPAP: ICD-10-CM

## 2018-01-16 LAB
ANION GAP SERPL CALCULATED.3IONS-SCNC: 11 MMOL/L (ref 3–16)
BUN BLDV-MCNC: 23 MG/DL (ref 7–20)
CALCIUM SERPL-MCNC: 9.8 MG/DL (ref 8.3–10.6)
CHLORIDE BLD-SCNC: 102 MMOL/L (ref 99–110)
CO2: 25 MMOL/L (ref 21–32)
CREAT SERPL-MCNC: 0.9 MG/DL (ref 0.9–1.3)
GFR AFRICAN AMERICAN: >60
GFR NON-AFRICAN AMERICAN: >60
GLUCOSE BLD-MCNC: 90 MG/DL (ref 70–99)
POTASSIUM SERPL-SCNC: 4.4 MMOL/L (ref 3.5–5.1)
SODIUM BLD-SCNC: 138 MMOL/L (ref 136–145)

## 2018-01-16 PROCEDURE — 90471 IMMUNIZATION ADMIN: CPT | Performed by: INTERNAL MEDICINE

## 2018-01-16 PROCEDURE — 36415 COLL VENOUS BLD VENIPUNCTURE: CPT | Performed by: INTERNAL MEDICINE

## 2018-01-16 PROCEDURE — 3046F HEMOGLOBIN A1C LEVEL >9.0%: CPT | Performed by: INTERNAL MEDICINE

## 2018-01-16 PROCEDURE — 99214 OFFICE O/P EST MOD 30 MIN: CPT | Performed by: INTERNAL MEDICINE

## 2018-01-16 PROCEDURE — G8484 FLU IMMUNIZE NO ADMIN: HCPCS | Performed by: INTERNAL MEDICINE

## 2018-01-16 PROCEDURE — G8417 CALC BMI ABV UP PARAM F/U: HCPCS | Performed by: INTERNAL MEDICINE

## 2018-01-16 PROCEDURE — G8427 DOCREV CUR MEDS BY ELIG CLIN: HCPCS | Performed by: INTERNAL MEDICINE

## 2018-01-16 PROCEDURE — 3017F COLORECTAL CA SCREEN DOC REV: CPT | Performed by: INTERNAL MEDICINE

## 2018-01-16 PROCEDURE — 90686 IIV4 VACC NO PRSV 0.5 ML IM: CPT | Performed by: INTERNAL MEDICINE

## 2018-01-16 PROCEDURE — 4004F PT TOBACCO SCREEN RCVD TLK: CPT | Performed by: INTERNAL MEDICINE

## 2018-01-16 ASSESSMENT — ENCOUNTER SYMPTOMS
GASTROINTESTINAL NEGATIVE: 1
ALLERGIC/IMMUNOLOGIC NEGATIVE: 1
RESPIRATORY NEGATIVE: 1

## 2018-01-16 NOTE — ASSESSMENT & PLAN NOTE
Sugars are good, diet is stable, weight is stable, no change in previously reported moderate neuropathy of feet, no change in vision, no claudication, no foot ulcers, no new skin lesions. No change in medications(Basiglar and Actos). No c/o with meds. Last eye exam 5/2017.

## 2018-01-16 NOTE — PATIENT INSTRUCTIONS
All above problems reviewed and the found to be unchanged except for the following :     Parmjit On Cpap. Continue C-pap. F/u w/ Sleep clinic as scheduled. Call if new c/o. Pure Hypercholesterolemia. Must improve diet and lose weight. Continue meds. Cigarette Smoker. Must stop. Call if needs further assistance. DM Type 2, Uncontrolled, With Neuropathy (Hcc). Will do labs. May need to adjust meds. Continue to improve diet and lose weight. Essential Hypertension. Continue present meds. Home BP checks. Call if>140/90. Improve diet. Avoid caffeine and salt. Call if new c/o w/ meds.

## 2018-01-16 NOTE — PROGRESS NOTES
mouth daily 90 tablet 1    budesonide-formoterol (SYMBICORT) 80-4.5 MCG/ACT AERO Inhale 2 puffs into the lungs 2 times daily 1 Inhaler 3    insulin glargine (BASAGLAR KWIKPEN) 100 UNIT/ML injection pen Inject 50 Units into the skin nightly 5 Pen 3    albuterol sulfate HFA (VENTOLIN HFA) 108 (90 Base) MCG/ACT inhaler Inhale 2 puffs into the lungs every 6 hours as needed for Wheezing 18 g 3    TRUEPLUS LANCETS 28G MISC USE TO TEST BLOOD SUGAR ONCE DAILY *PT USES TRUE METRIX* 100 each 3    fluticasone (FLONASE) 50 MCG/ACT nasal spray 1 spray by Nasal route daily 3 Bottle 3    fexofenadine-pseudoephedrine (ALLEGRA-D ALLERGY & CONGESTION)  MG per extended release tablet Take 1 tablet by mouth 2 times daily as needed (allergies and head congestion) 30 tablet 0    budesonide (PULMICORT) 0.5 MG/2ML nebulizer suspension Take 2 mLs by nebulization 2 times daily 180 ampule 3    Respiratory Therapy Supplies (NEBULIZER COMPRESSOR) KIT 1 kit by Does not apply route daily as needed (use as needed) Use with inhalation solution, DX: J45.41 1 kit 0     Current Facility-Administered Medications   Medication Dose Route Frequency Provider Last Rate Last Dose    albuterol (ACCUNEB) nebulizer solution 1.25 mg  1.25 mg Nebulization Once Kimberly Adame MD        levalbuterol Tomekaanna Warner) nebulization 0.63 mg  0.63 mg Nebulization Once Becky Chong MD         Allergies   Allergen Reactions    Ibuprofen Nausea Only    Metformin And Related Diarrhea     Social History   Substance Use Topics    Smoking status: Current Every Day Smoker     Packs/day: 0.50     Years: 30.00    Smokeless tobacco: Never Used      Comment: counseled 5/5/14    Alcohol use No     Family History   Problem Relation Age of Onset    Diabetes Mother     Diabetes Father        Review of Systems   Constitutional: Negative. Respiratory: Negative. Cardiovascular: Negative. Gastrointestinal: Negative. Endocrine: Negative.     Genitourinary:

## 2018-01-17 LAB
ESTIMATED AVERAGE GLUCOSE: 154.2 MG/DL
HBA1C MFR BLD: 7 %

## 2018-02-08 ENCOUNTER — TELEPHONE (OUTPATIENT)
Dept: PULMONOLOGY | Age: 53
End: 2018-02-08

## 2018-02-13 ENCOUNTER — OFFICE VISIT (OUTPATIENT)
Dept: PULMONOLOGY | Age: 53
End: 2018-02-13

## 2018-02-13 VITALS
HEIGHT: 64 IN | RESPIRATION RATE: 16 BRPM | SYSTOLIC BLOOD PRESSURE: 124 MMHG | BODY MASS INDEX: 39.27 KG/M2 | HEART RATE: 75 BPM | TEMPERATURE: 98.6 F | WEIGHT: 230 LBS | DIASTOLIC BLOOD PRESSURE: 69 MMHG | OXYGEN SATURATION: 98 %

## 2018-02-13 DIAGNOSIS — G47.33 OSA TREATED WITH BIPAP: Primary | ICD-10-CM

## 2018-02-13 DIAGNOSIS — E66.9 OBESITY (BMI 30-39.9): ICD-10-CM

## 2018-02-13 DIAGNOSIS — Z71.89 ENCOUNTER FOR BIPAP USE COUNSELING: ICD-10-CM

## 2018-02-13 PROCEDURE — 99213 OFFICE O/P EST LOW 20 MIN: CPT | Performed by: NURSE PRACTITIONER

## 2018-02-13 PROCEDURE — G8484 FLU IMMUNIZE NO ADMIN: HCPCS | Performed by: NURSE PRACTITIONER

## 2018-02-13 PROCEDURE — G8417 CALC BMI ABV UP PARAM F/U: HCPCS | Performed by: NURSE PRACTITIONER

## 2018-02-13 PROCEDURE — 4004F PT TOBACCO SCREEN RCVD TLK: CPT | Performed by: NURSE PRACTITIONER

## 2018-02-13 PROCEDURE — G8427 DOCREV CUR MEDS BY ELIG CLIN: HCPCS | Performed by: NURSE PRACTITIONER

## 2018-02-13 PROCEDURE — 3017F COLORECTAL CA SCREEN DOC REV: CPT | Performed by: NURSE PRACTITIONER

## 2018-02-13 ASSESSMENT — SLEEP AND FATIGUE QUESTIONNAIRES
ESS TOTAL SCORE: 0
HOW LIKELY ARE YOU TO NOD OFF OR FALL ASLEEP WHILE SITTING QUIETLY AFTER LUNCH WITHOUT ALCOHOL: 0
HOW LIKELY ARE YOU TO NOD OFF OR FALL ASLEEP IN A CAR, WHILE STOPPED FOR A FEW MINUTES IN TRAFFIC: 0
HOW LIKELY ARE YOU TO NOD OFF OR FALL ASLEEP WHILE SITTING INACTIVE IN A PUBLIC PLACE: 0
HOW LIKELY ARE YOU TO NOD OFF OR FALL ASLEEP WHILE LYING DOWN TO REST IN THE AFTERNOON WHEN CIRCUMSTANCES PERMIT: 0
HOW LIKELY ARE YOU TO NOD OFF OR FALL ASLEEP WHILE SITTING AND READING: 0
HOW LIKELY ARE YOU TO NOD OFF OR FALL ASLEEP WHILE WATCHING TV: 0
HOW LIKELY ARE YOU TO NOD OFF OR FALL ASLEEP WHEN YOU ARE A PASSENGER IN A CAR FOR AN HOUR WITHOUT A BREAK: 0
HOW LIKELY ARE YOU TO NOD OFF OR FALL ASLEEP WHILE SITTING AND TALKING TO SOMEONE: 0
NECK CIRCUMFERENCE (INCHES): 19.5

## 2018-02-13 NOTE — PATIENT INSTRUCTIONS
Please keep all of your future appointments scheduled by [facility], [facility] ([facility]) Pulmonary and Sleep. You should have a follow up appointment scheduled with a sleep medicine provider within 12 months. Routine parts, masks, tubing and filters should all be obtainable from your DME (equipment) provider. Any problems related to mask fit, comfort or functioning of equipment should also be addressed directly with your DME provider. If you are unable to resolve problems, then please notify our office and schedule an appointment to be seen sooner than the usual follow up appointment. For all patients who are evaluated for sleep disorders, never drive or operate motorized equipment while sleepy, fatigued or groggy. Please bring in all of your sleep equipment to all of your appointments, including machine, mask, cords and hoses. Here are some tips to to getting better sleep  1- Avoid napping during the day: This will ensure you are tired at bedtime. If you have to take a nap, sleep less than one hour, before 3 pm.   2- Exercise regularly, but not right before bed: but the timing of the workout is important. Exercising in the morning or early afternoon will not interfere with sleep. Exercising within two hours before bedtime can decrease your ability to fall asleep. Regular exercise is recommended to help you deepen the sleep. 3- Avoid heavy, spicy, or sugary foods 4-6 hours before bedtime: These can affect your ability to stay asleep. 4- Have a light snack before bed: Having an empty stomach can interfere with your sleep. Dairy products and turkey contain tryptophan, which acts as a natural sleep inducer. 5- Stay away from caffeine, nicotine and alcohol at least 4-6 hours before bed: Caffeine and nicotine are stimulants that interfere with your ability to fall asleep.  While alcohol has an immediate sleep-inducing effect, a few hours later, as alcohol levels in your blood start to fall, there

## 2018-02-13 NOTE — PROGRESS NOTES
recommendations BiPAP 21/16 cm H2O    Assessment:       · Severe LIANE. BiPAP 21/16 cm H2O. Optimal compliance and efficacy on review today. · Snoring- resolved on BIPAP  · Witnessed apnea -resolved on BIPAP  · Hypersomnia-resolved with BiPAP use  · Asthma, DM, HTN- followed by PCP  · Depression- PHQ-9 score 13 in office at initial visit = moderate depression -patient states symptoms better currently and will f/u with PCP  · Obesity        Plan:       · Continue BIPAP 21/16 cm H2O  · Order mask -Rojas Isabel Chong Full face size Small- patient to call DME to make aware wrong mask was sent. · Discussed severity of sleep apnea and importance of BiPAP use  · Advised to use BiPAP 6-8 hrs at night and during naps. · Replacement of mask, tubing, head straps every 3-6 months or sooner if damaged. · Patient instructed to contact DME company for any mask, tubing or machine trouble shooting if problems arise. · Sleep hygiene  · Avoid sedatives, alcohol and caffeinated drinks at bed time. · No driving motorized vehicles or operating heavy machinery while fatigue, drowsy or sleepy. No driving until daytime sleepiness/nodding off when driving is resolved- patient verbalized understanding and agrees. · Weight loss is also recommended as a long-term intervention. · Complications of LIANE if not treated were discussed with patient patient to include systemic hypertension, pulmonary hypertension, cardiovascular morbidities, car accidents and all cause mortality.   · Patient education handout provided regarding sleep tips and CPAP cleaning recommendations   Again discussed-Follow up with PCP for depression- patient verbalized understanding and agrees    Follow up: 21 year, sooner if needed

## 2018-03-14 RX ORDER — DILTIAZEM HYDROCHLORIDE 60 MG/1
TABLET, FILM COATED ORAL
Qty: 10.2 G | Refills: 11 | Status: SHIPPED | OUTPATIENT
Start: 2018-03-14 | End: 2018-07-02 | Stop reason: HOSPADM

## 2018-05-10 RX ORDER — FLUTICASONE PROPIONATE 50 MCG
SPRAY, SUSPENSION (ML) NASAL
Qty: 48 G | Refills: 2 | Status: SHIPPED | OUTPATIENT
Start: 2018-05-10 | End: 2018-07-02 | Stop reason: ALTCHOICE

## 2018-05-24 RX ORDER — FENOFIBRATE 160 MG/1
TABLET ORAL
Qty: 90 TABLET | Refills: 1 | Status: SHIPPED | OUTPATIENT
Start: 2018-05-24 | End: 2018-07-02 | Stop reason: SDUPTHER

## 2018-05-24 RX ORDER — LISINOPRIL 5 MG/1
TABLET ORAL
Qty: 90 TABLET | Refills: 1 | Status: SHIPPED | OUTPATIENT
Start: 2018-05-24 | End: 2018-07-02 | Stop reason: SDUPTHER

## 2018-05-24 RX ORDER — PIOGLITAZONEHYDROCHLORIDE 30 MG/1
TABLET ORAL
Qty: 90 TABLET | Refills: 1 | Status: SHIPPED | OUTPATIENT
Start: 2018-05-24 | End: 2018-07-02 | Stop reason: SDUPTHER

## 2018-05-24 RX ORDER — GABAPENTIN 400 MG/1
CAPSULE ORAL
Qty: 360 CAPSULE | Refills: 1 | Status: SHIPPED | OUTPATIENT
Start: 2018-05-24 | End: 2018-07-02 | Stop reason: SDUPTHER

## 2018-05-24 RX ORDER — METOPROLOL TARTRATE 50 MG/1
TABLET, FILM COATED ORAL
Qty: 180 TABLET | Refills: 1 | Status: SHIPPED | OUTPATIENT
Start: 2018-05-24 | End: 2018-11-05 | Stop reason: SDUPTHER

## 2018-06-22 ENCOUNTER — TELEPHONE (OUTPATIENT)
Dept: PULMONOLOGY | Age: 53
End: 2018-06-22

## 2018-06-22 DIAGNOSIS — G47.33 OSA (OBSTRUCTIVE SLEEP APNEA): Primary | ICD-10-CM

## 2018-07-02 ENCOUNTER — OFFICE VISIT (OUTPATIENT)
Dept: FAMILY MEDICINE CLINIC | Age: 53
End: 2018-07-02

## 2018-07-02 VITALS
RESPIRATION RATE: 18 BRPM | OXYGEN SATURATION: 97 % | HEIGHT: 64 IN | HEART RATE: 66 BPM | SYSTOLIC BLOOD PRESSURE: 116 MMHG | DIASTOLIC BLOOD PRESSURE: 68 MMHG | WEIGHT: 232 LBS | BODY MASS INDEX: 39.61 KG/M2

## 2018-07-02 DIAGNOSIS — I10 ESSENTIAL HYPERTENSION: ICD-10-CM

## 2018-07-02 DIAGNOSIS — Z00.00 ANNUAL PHYSICAL EXAM: Primary | ICD-10-CM

## 2018-07-02 DIAGNOSIS — E66.9 OBESITY (BMI 30-39.9): ICD-10-CM

## 2018-07-02 DIAGNOSIS — G47.33 OSA TREATED WITH BIPAP: ICD-10-CM

## 2018-07-02 DIAGNOSIS — F33.41 RECURRENT MAJOR DEPRESSIVE DISORDER, IN PARTIAL REMISSION (HCC): ICD-10-CM

## 2018-07-02 DIAGNOSIS — R53.83 FATIGUE, UNSPECIFIED TYPE: ICD-10-CM

## 2018-07-02 DIAGNOSIS — Z12.5 SCREENING FOR PROSTATE CANCER: ICD-10-CM

## 2018-07-02 DIAGNOSIS — E78.00 PURE HYPERCHOLESTEROLEMIA: ICD-10-CM

## 2018-07-02 DIAGNOSIS — Z23 PNEUMOCOCCAL VACCINATION ADMINISTERED AT CURRENT VISIT: ICD-10-CM

## 2018-07-02 DIAGNOSIS — J45.41 MODERATE PERSISTENT ASTHMA WITH ACUTE EXACERBATION: ICD-10-CM

## 2018-07-02 LAB
BILIRUBIN, POC: 0
BLOOD URINE, POC: 0
CLARITY, POC: 0
COLOR, POC: 0
CREATININE URINE POCT: NORMAL
GLUCOSE URINE, POC: 0
KETONES, POC: 0
LEUKOCYTE EST, POC: 0
MICROALBUMIN/CREAT 24H UR: 10 MG/G{CREAT}
MICROALBUMIN/CREAT UR-RTO: NORMAL
NITRITE, POC: 0
PH, POC: 5
PROTEIN, POC: 0
SPECIFIC GRAVITY, POC: 1.03
UROBILINOGEN, POC: 0.2

## 2018-07-02 PROCEDURE — 99396 PREV VISIT EST AGE 40-64: CPT | Performed by: INTERNAL MEDICINE

## 2018-07-02 PROCEDURE — 82044 UR ALBUMIN SEMIQUANTITATIVE: CPT | Performed by: INTERNAL MEDICINE

## 2018-07-02 PROCEDURE — 81002 URINALYSIS NONAUTO W/O SCOPE: CPT | Performed by: INTERNAL MEDICINE

## 2018-07-02 PROCEDURE — 90732 PPSV23 VACC 2 YRS+ SUBQ/IM: CPT | Performed by: INTERNAL MEDICINE

## 2018-07-02 ASSESSMENT — ENCOUNTER SYMPTOMS
EYES NEGATIVE: 1
GASTROINTESTINAL NEGATIVE: 1
ALLERGIC/IMMUNOLOGIC NEGATIVE: 1
BACK PAIN: 0
SHORTNESS OF BREATH: 1

## 2018-07-02 NOTE — ASSESSMENT & PLAN NOTE
No change in meds, no c/o with meds, no chest pain, SOB, palpatations, or syncope. Home bp is 110-120s/70-80s.

## 2018-07-02 NOTE — PROGRESS NOTES
(NEURONTIN) 400 MG capsule TAKE 1 CAPSULE BY MOUTH TWICE A DAY ~108Q2 TAKE 2 CAPSULES BY MOUTH AT BEDTIME 360 capsule 11    fenofibrate 160 MG tablet TAKE 1 TABLET BY MOUTH ONCE DAILY 90 tablet 11    pioglitazone (ACTOS) 30 MG tablet TAKE 1 TABLET BY MOUTH ONCE DAILY 90 tablet 11    lisinopril (PRINIVIL;ZESTRIL) 5 MG tablet TAKE 1 TABLET BY MOUTH ONCE DAILY 90 tablet 11    SYMBICORT 80-4.5 MCG/ACT AERO INHALE 2 PUFFS BY MOUTH TWICE DAILY 30.6 g 11    insulin glargine (BASAGLAR KWIKPEN) 100 UNIT/ML injection pen Inject 50 Units into the skin nightly 5 Pen 3    fluticasone (FLONASE) 50 MCG/ACT nasal spray 1 spray by Nasal route daily 3 Bottle 3    fexofenadine-pseudoephedrine (ALLEGRA-D ALLERGY & CONGESTION)  MG per extended release tablet Take 1 tablet by mouth 2 times daily as needed (allergies and head congestion) 30 tablet 0    budesonide (PULMICORT) 0.5 MG/2ML nebulizer suspension Take 2 mLs by nebulization 2 times daily 180 ampule 3    VENTOLIN  (90 Base) MCG/ACT inhaler INHALE 2 (TWO) PUFFS BY MOUTH EVERY 6 HOURS AS NEEDED FOR WHEEZING 18 g 10    TRUE METRIX BLOOD GLUCOSE TEST strip USE TO TEST BLOOD SUGAR ONCE DAILY AS NEEDED 100 each 5    EASY TOUCH PEN NEEDLES 31G X 5 MM MISC USE FOR INJECTION DAILY 100 each 11    TRUEPLUS LANCETS 28G MISC USE TO TEST BLOOD SUGAR ONCE DAILY *PT USES TRUE METRIX* 100 each 3    Respiratory Therapy Supplies (NEBULIZER COMPRESSOR) KIT 1 kit by Does not apply route daily as needed (use as needed) Use with inhalation solution, DX: J45.41 1 kit 0     Current Facility-Administered Medications   Medication Dose Route Frequency Provider Last Rate Last Dose    albuterol (ACCUNEB) nebulizer solution 1.25 mg  1.25 mg Nebulization Once Reaz Coon MD        levalbuterol Tabatha Ruelas) nebulization 0.63 mg  0.63 mg Nebulization Once Jose Antonio Hurtado MD         Allergies   Allergen Reactions    Ibuprofen Nausea Only    Metformin And Related Diarrhea     Social vibratory senses. Skin: Skin is warm, dry and intact. No abrasion and no rash noted. He is not diaphoretic. No cyanosis or erythema. No pallor. Nails show no clubbing. Psychiatric: He has a normal mood and affect. His speech is normal and behavior is normal. Judgment and thought content normal. Cognition and memory are normal.       Assessment:      Problem   Annual Physical Exam. Due for DM eye exam. Pneumovax 23 given. Rx for Shingrix given. Parmjit Treated With Bipap. Some issues w/ pressure. Obesity (Bmi 30-39.9). Wt up 20+ lbs over last yr   Pure Hypercholesterolemia. Wt up, diet fair. Cigarette Smoker. Still smoking a couple cig/d. Moderate Persistent Asthma With Acute Exacerbation. No new c/o. DM Type 2, Uncontrolled, With Neuropathy (Hcc). Good w/ meds. Essential Hypertension. Good w/ meds. Depression. Stable w/o meds. Pt declines tx. Plan:      All above problems reviewed and the found to be unchanged except for the following :     Annual Physical Exam. Due for DM eye exam. Pneumovax 23 given. Rx for Shingrix given. Parmjit Treated With Bipap. To Sleep clinic to check pressure. Obesity (Bmi 30-39.9). Goal is to lose wt to <185 lbs. Pure Hypercholesterolemia. Must improve diet and lose weight. Continue meds. Will do labs. Cigarette Smoker. Must stop. Use gum or lozenges. Moderate Persistent Asthma With Acute Exacerbation. Must stop smoking. DM Type 2, Uncontrolled, With Neuropathy (Hcc). Will do labs and adjust meds as needed. To improve diet and lose weight. Call if new c/o. Essential Hypertension. Continue present meds. Home BP checks. Call if>140/90. Improve diet. Avoid caffeine and salt. Call if new c/o w/ meds. Depression. To call if new c/o. Discussed options.

## 2018-07-02 NOTE — PATIENT INSTRUCTIONS
All above problems reviewed and the found to be unchanged except for the following :     Annual Physical Exam. Due for DM eye exam. Pneumovax 23 given. Rx for Shingrix given. Parmjit Treated With Bipap. To Sleep clinic to check pressure. Obesity (Bmi 30-39.9). Goal is to lose wt to <185 lbs. Pure Hypercholesterolemia. Must improve diet and lose weight. Continue meds. Will do labs. Cigarette Smoker. Must stop. Use gum or lozenges. Moderate Persistent Asthma With Acute Exacerbation. Must stop smoking. DM Type 2, Uncontrolled, With Neuropathy (Hcc). Will do labs and adjust meds as needed. To improve diet and lose weight. Call if new c/o. Essential Hypertension. Continue present meds. Home BP checks. Call if>140/90. Improve diet. Avoid caffeine and salt. Call if new c/o w/ meds. Depression. To call if new c/o. Discussed options. Prostate: today  Colonoscopy: 6/8/2017. rechx in 10 yrs. DEXA: na  Eye: 6/13/2017. due  Hearing: passed in office exam  Immunization: Rx for Shingrix given. Flu shot in Oct/Nov. Pneumovax 23 today. MMSE: na  Get Up and Go: na  Tob: smoker/ 1 pk/week. ETOH: 3-6 beers/week. Caffeine: low. Cardiac Risk Assessment: labs pending.   Living will: no  Medical power of : no

## 2018-07-03 ENCOUNTER — NURSE ONLY (OUTPATIENT)
Dept: FAMILY MEDICINE CLINIC | Age: 53
End: 2018-07-03

## 2018-07-03 ENCOUNTER — TELEPHONE (OUTPATIENT)
Dept: PULMONOLOGY | Age: 53
End: 2018-07-03

## 2018-07-03 DIAGNOSIS — E66.9 OBESITY (BMI 30-39.9): ICD-10-CM

## 2018-07-03 DIAGNOSIS — J45.41 MODERATE PERSISTENT ASTHMA WITH ACUTE EXACERBATION: ICD-10-CM

## 2018-07-03 DIAGNOSIS — E78.00 PURE HYPERCHOLESTEROLEMIA: ICD-10-CM

## 2018-07-03 DIAGNOSIS — F33.41 RECURRENT MAJOR DEPRESSIVE DISORDER, IN PARTIAL REMISSION (HCC): ICD-10-CM

## 2018-07-03 DIAGNOSIS — R53.83 FATIGUE, UNSPECIFIED TYPE: ICD-10-CM

## 2018-07-03 DIAGNOSIS — G47.33 OSA TREATED WITH BIPAP: Primary | ICD-10-CM

## 2018-07-03 DIAGNOSIS — Z12.5 SCREENING FOR PROSTATE CANCER: ICD-10-CM

## 2018-07-03 DIAGNOSIS — Z00.00 ANNUAL PHYSICAL EXAM: ICD-10-CM

## 2018-07-03 LAB
ALBUMIN SERPL-MCNC: 4.4 G/DL (ref 3.4–5)
ALP BLD-CCNC: 51 U/L (ref 40–129)
ALT SERPL-CCNC: 16 U/L (ref 10–40)
ANION GAP SERPL CALCULATED.3IONS-SCNC: 14 MMOL/L (ref 3–16)
AST SERPL-CCNC: 16 U/L (ref 15–37)
BILIRUB SERPL-MCNC: 0.4 MG/DL (ref 0–1)
BILIRUBIN DIRECT: <0.2 MG/DL (ref 0–0.3)
BILIRUBIN, INDIRECT: NORMAL MG/DL (ref 0–1)
BUN BLDV-MCNC: 25 MG/DL (ref 7–20)
CALCIUM SERPL-MCNC: 9.3 MG/DL (ref 8.3–10.6)
CHLORIDE BLD-SCNC: 101 MMOL/L (ref 99–110)
CHOLESTEROL, TOTAL: 144 MG/DL (ref 0–199)
CO2: 25 MMOL/L (ref 21–32)
CREAT SERPL-MCNC: 0.9 MG/DL (ref 0.9–1.3)
GFR AFRICAN AMERICAN: >60
GFR NON-AFRICAN AMERICAN: >60
GLUCOSE BLD-MCNC: 74 MG/DL (ref 70–99)
HCT VFR BLD CALC: 40.8 % (ref 40.5–52.5)
HDLC SERPL-MCNC: 37 MG/DL (ref 40–60)
HEMOGLOBIN: 14.1 G/DL (ref 13.5–17.5)
LDL CHOLESTEROL CALCULATED: 72 MG/DL
MCH RBC QN AUTO: 31.6 PG (ref 26–34)
MCHC RBC AUTO-ENTMCNC: 34.6 G/DL (ref 31–36)
MCV RBC AUTO: 91.3 FL (ref 80–100)
PDW BLD-RTO: 13.2 % (ref 12.4–15.4)
PHOSPHORUS: 2.7 MG/DL (ref 2.5–4.9)
PLATELET # BLD: 166 K/UL (ref 135–450)
PMV BLD AUTO: 10.7 FL (ref 5–10.5)
POTASSIUM SERPL-SCNC: 4.2 MMOL/L (ref 3.5–5.1)
PROSTATE SPECIFIC ANTIGEN: 0.34 NG/ML (ref 0–4)
RBC # BLD: 4.47 M/UL (ref 4.2–5.9)
SODIUM BLD-SCNC: 140 MMOL/L (ref 136–145)
TOTAL PROTEIN: 6.5 G/DL (ref 6.4–8.2)
TRIGL SERPL-MCNC: 176 MG/DL (ref 0–150)
TSH SERPL DL<=0.05 MIU/L-ACNC: 2.37 UIU/ML (ref 0.27–4.2)
VLDLC SERPL CALC-MCNC: 35 MG/DL
WBC # BLD: 7.3 K/UL (ref 4–11)

## 2018-07-03 PROCEDURE — 36415 COLL VENOUS BLD VENIPUNCTURE: CPT | Performed by: INTERNAL MEDICINE

## 2018-07-03 NOTE — TELEPHONE ENCOUNTER
Patient to office today feels pressure too high. BiPAP compliance report from 6/3/187/2/18 on BiPAP 21/16 cm H2O reviewed. Compliance is great 100% AHI is great 0.2. Will trial pressure change to BiPAP 20/15 cm H2O also patient to get mask fitting in office with RT today.   Patient to call with continued issues will also move follow-up appointment up    Look for compliance report in 30 days around 8/3/18

## 2018-07-04 LAB
ESTIMATED AVERAGE GLUCOSE: 122.6 MG/DL
HBA1C MFR BLD: 5.9 %

## 2018-08-01 PROBLEM — Z00.00 ANNUAL PHYSICAL EXAM: Status: RESOLVED | Noted: 2018-07-02 | Resolved: 2018-08-01

## 2018-08-03 NOTE — TELEPHONE ENCOUNTER
Orders faxed to [facility]  [phone]. Will look for compliance in 30 days around 9/4/18.

## 2018-08-09 NOTE — TELEPHONE ENCOUNTER
BIPAP compliance from 7/10/188/8/18 on BiPAP 20/15 cm H2O reviewed. Compliance is great 100%, AHI is great 0.2. Patient states pressure feels much better with decrease. He is having some irritation from mask. Discussed washing mask daily and can use Rem[employer]ZS mask liners if needed. Keep appointment in February 2019.   Call if further questions

## 2018-09-13 RX ORDER — GLUCOSAM/CHON-MSM1/C/MANG/BOSW 500-416.6
TABLET ORAL
Qty: 100 EACH | Refills: 3 | Status: SHIPPED | OUTPATIENT
Start: 2018-09-13 | End: 2019-10-03 | Stop reason: SDUPTHER

## 2018-09-17 RX ORDER — LANCETS 30 GAUGE
EACH MISCELLANEOUS
Qty: 100 EACH | Refills: 3 | Status: SHIPPED | OUTPATIENT
Start: 2018-09-17 | End: 2019-12-16 | Stop reason: SDUPTHER

## 2018-10-18 ENCOUNTER — OFFICE VISIT (OUTPATIENT)
Dept: FAMILY MEDICINE CLINIC | Age: 53
End: 2018-10-18
Payer: COMMERCIAL

## 2018-10-18 ENCOUNTER — CARE COORDINATION (OUTPATIENT)
Dept: FAMILY MEDICINE CLINIC | Age: 53
End: 2018-10-18

## 2018-10-18 VITALS
HEART RATE: 70 BPM | HEIGHT: 64 IN | BODY MASS INDEX: 38.93 KG/M2 | WEIGHT: 228 LBS | OXYGEN SATURATION: 97 % | SYSTOLIC BLOOD PRESSURE: 132 MMHG | DIASTOLIC BLOOD PRESSURE: 76 MMHG | RESPIRATION RATE: 16 BRPM

## 2018-10-18 DIAGNOSIS — Z11.59 NEED FOR HEPATITIS C SCREENING TEST: ICD-10-CM

## 2018-10-18 DIAGNOSIS — E78.00 PURE HYPERCHOLESTEROLEMIA: ICD-10-CM

## 2018-10-18 DIAGNOSIS — F17.210 CIGARETTE SMOKER: ICD-10-CM

## 2018-10-18 DIAGNOSIS — Z11.4 SCREENING FOR HIV (HUMAN IMMUNODEFICIENCY VIRUS): Primary | ICD-10-CM

## 2018-10-18 DIAGNOSIS — I10 ESSENTIAL HYPERTENSION: ICD-10-CM

## 2018-10-18 PROCEDURE — 2022F DILAT RTA XM EVC RTNOPTHY: CPT | Performed by: INTERNAL MEDICINE

## 2018-10-18 PROCEDURE — 4004F PT TOBACCO SCREEN RCVD TLK: CPT | Performed by: INTERNAL MEDICINE

## 2018-10-18 PROCEDURE — G8427 DOCREV CUR MEDS BY ELIG CLIN: HCPCS | Performed by: INTERNAL MEDICINE

## 2018-10-18 PROCEDURE — 3017F COLORECTAL CA SCREEN DOC REV: CPT | Performed by: INTERNAL MEDICINE

## 2018-10-18 PROCEDURE — G8482 FLU IMMUNIZE ORDER/ADMIN: HCPCS | Performed by: INTERNAL MEDICINE

## 2018-10-18 PROCEDURE — 3044F HG A1C LEVEL LT 7.0%: CPT | Performed by: INTERNAL MEDICINE

## 2018-10-18 PROCEDURE — 36415 COLL VENOUS BLD VENIPUNCTURE: CPT | Performed by: INTERNAL MEDICINE

## 2018-10-18 PROCEDURE — G8417 CALC BMI ABV UP PARAM F/U: HCPCS | Performed by: INTERNAL MEDICINE

## 2018-10-18 PROCEDURE — 90471 IMMUNIZATION ADMIN: CPT | Performed by: INTERNAL MEDICINE

## 2018-10-18 PROCEDURE — 99214 OFFICE O/P EST MOD 30 MIN: CPT | Performed by: INTERNAL MEDICINE

## 2018-10-18 PROCEDURE — 90686 IIV4 VACC NO PRSV 0.5 ML IM: CPT | Performed by: INTERNAL MEDICINE

## 2018-10-18 ASSESSMENT — ENCOUNTER SYMPTOMS
ALLERGIC/IMMUNOLOGIC NEGATIVE: 1
RESPIRATORY NEGATIVE: 1
GASTROINTESTINAL NEGATIVE: 1

## 2018-10-18 NOTE — PATIENT INSTRUCTIONS
All above problems reviewed and the found to be unchanged except for the following :     Pure Hypercholesterolemia. Will continue to improve diet and lose weight. Cigarette Smoker. Must stop. Discussed Gum again. DM Type 2, Uncontrolled, With Neuropathy (Hcc). Will do labs and adjust meds as needed. Essential Hypertension. Continue present meds. Home BP checks. Call if>140/90. Improve diet. Avoid caffeine and salt. Call if new c/o w/ meds.

## 2018-10-18 NOTE — ASSESSMENT & PLAN NOTE
Sugars are good, diet is fair, weight is stable since last visit, no change in previously reported moderate neuropathy of feet, no change in vision, no claudication, no foot ulcers, no new skin lesions. No change in medications(Basiglar and Actos). No c/o with meds. Last eye exam 8/2/2018.

## 2018-10-19 LAB
ANION GAP SERPL CALCULATED.3IONS-SCNC: 13 MMOL/L (ref 3–16)
BUN BLDV-MCNC: 22 MG/DL (ref 7–20)
CALCIUM SERPL-MCNC: 9.4 MG/DL (ref 8.3–10.6)
CHLORIDE BLD-SCNC: 105 MMOL/L (ref 99–110)
CO2: 25 MMOL/L (ref 21–32)
CREAT SERPL-MCNC: 0.9 MG/DL (ref 0.9–1.3)
ESTIMATED AVERAGE GLUCOSE: 114 MG/DL
GFR AFRICAN AMERICAN: >60
GFR NON-AFRICAN AMERICAN: >60
GLUCOSE BLD-MCNC: 59 MG/DL (ref 70–99)
HBA1C MFR BLD: 5.6 %
HEPATITIS C ANTIBODY INTERPRETATION: NORMAL
HIV AG/AB: NORMAL
HIV ANTIGEN: NORMAL
HIV-1 ANTIBODY: NORMAL
HIV-2 AB: NORMAL
POTASSIUM SERPL-SCNC: 3.7 MMOL/L (ref 3.5–5.1)
SODIUM BLD-SCNC: 143 MMOL/L (ref 136–145)

## 2018-10-22 ENCOUNTER — CARE COORDINATION (OUTPATIENT)
Dept: CARE COORDINATION | Age: 53
End: 2018-10-22

## 2018-10-26 ENCOUNTER — CARE COORDINATION (OUTPATIENT)
Dept: FAMILY MEDICINE CLINIC | Age: 53
End: 2018-10-26

## 2018-11-05 RX ORDER — FENOFIBRATE 160 MG/1
TABLET ORAL
Qty: 90 TABLET | Refills: 1 | Status: SHIPPED | OUTPATIENT
Start: 2018-11-05 | End: 2019-05-06 | Stop reason: SDUPTHER

## 2018-11-05 RX ORDER — LISINOPRIL 5 MG/1
TABLET ORAL
Qty: 90 TABLET | Refills: 1 | Status: SHIPPED | OUTPATIENT
Start: 2018-11-05 | End: 2019-05-06 | Stop reason: SDUPTHER

## 2018-11-05 RX ORDER — GABAPENTIN 400 MG/1
CAPSULE ORAL
Qty: 360 CAPSULE | Refills: 1 | Status: SHIPPED | OUTPATIENT
Start: 2018-11-05 | End: 2019-05-06 | Stop reason: SDUPTHER

## 2018-11-05 RX ORDER — METOPROLOL TARTRATE 50 MG/1
TABLET, FILM COATED ORAL
Qty: 180 TABLET | Refills: 1 | Status: SHIPPED | OUTPATIENT
Start: 2018-11-05 | End: 2019-05-06 | Stop reason: SDUPTHER

## 2018-11-05 RX ORDER — PIOGLITAZONEHYDROCHLORIDE 30 MG/1
TABLET ORAL
Qty: 90 TABLET | Refills: 1 | Status: SHIPPED | OUTPATIENT
Start: 2018-11-05 | End: 2019-05-06 | Stop reason: SDUPTHER

## 2018-11-09 ENCOUNTER — CARE COORDINATION (OUTPATIENT)
Dept: FAMILY MEDICINE CLINIC | Age: 53
End: 2018-11-09

## 2018-11-27 RX ORDER — INSULIN GLARGINE 100 [IU]/ML
INJECTION, SOLUTION SUBCUTANEOUS
Qty: 15 ML | Refills: 11 | Status: SHIPPED | OUTPATIENT
Start: 2018-11-27 | End: 2019-10-29 | Stop reason: SDUPTHER

## 2018-11-27 RX ORDER — DILTIAZEM HYDROCHLORIDE 60 MG/1
TABLET, FILM COATED ORAL
Qty: 30.6 G | Refills: 11 | Status: SHIPPED | OUTPATIENT
Start: 2018-11-27 | End: 2019-11-29 | Stop reason: SDUPTHER

## 2018-12-11 ENCOUNTER — CARE COORDINATION (OUTPATIENT)
Dept: FAMILY MEDICINE CLINIC | Age: 53
End: 2018-12-11

## 2018-12-11 NOTE — CARE COORDINATION
DAILY 11/5/18   JOSSELIN Ugarte MD   blood glucose test strips (TRUETEST TEST) strip 1 each by In Vitro route daily Dx e11.9   true test test strips test one time daily 10/18/18   JOSSELIN Ugarte MD   Lancets MISC Test once daily for diabetes e11.9 9/17/18   JOSSELIN Ugarte MD   TRUEPLUS LANCETS 28G MISC USE TO TEST BLOOD SUGAR ONCE DAILY *PT USES TRUE METRIX* 9/13/18   JOSSELIN Ugarte MD   blood glucose test strips (TRUETEST TEST) strip 1 each by In Vitro route daily As needed.  7/19/18   JOSSELIN Ugarte MD   VENTOLIN  (90 Base) MCG/ACT inhaler INHALE 2 (TWO) PUFFS BY MOUTH EVERY 6 HOURS AS NEEDED FOR WHEEZING 5/10/18   JOSSELIN Ugarte MD   EASY TOUCH PEN NEEDLES 31G X 5 MM MISC USE FOR INJECTION DAILY 12/12/17   JOSSELIN Ugarte MD   insulin glargine (BASAGLAR KWIKPEN) 100 UNIT/ML injection pen Inject 50 Units into the skin nightly 11/13/17   JOSSELIN Ugarte MD   fluticasone (FLONASE) 50 MCG/ACT nasal spray 1 spray by Nasal route daily 8/23/17   Imani Damon [place], PA   fexofenadine-pseudoephedrine (ALLEGRA-D ALLERGY & CONGESTION)  MG per extended release tablet Take 1 tablet by mouth 2 times daily as needed (allergies and head congestion) 8/23/17   ADELA Soria   budesonide (PULMICORT) 0.5 MG/2ML nebulizer suspension Take 2 mLs by nebulization 2 times daily 7/21/17   JOSSELIN Ugarte MD   Respiratory Therapy Supplies (NEBULIZER COMPRESSOR) KIT 1 kit by Does not apply route daily as needed (use as needed) Use with inhalation solution, DX: J45.41 12/8/16   JOSSELIN Ugarte MD       Future Appointments  Date Time Provider Lianna Calvo   1/22/2019 7:40 AM JOSSELIN Ugarte MD [facility]   2/13/2019 9:00 AM Oly De Paz APRN - CNP [facility]

## 2018-12-13 ENCOUNTER — TELEPHONE (OUTPATIENT)
Dept: FAMILY MEDICINE CLINIC | Age: 53
End: 2018-12-13

## 2018-12-13 NOTE — TELEPHONE ENCOUNTER
[facility] called regarding a prescription for a gel. The lady was very short on information. I advised patient had not been prescribed a gel, nor does he use [facility],  [facility] is not [facility] according to he. When I questioned her further, she said she would just send a fax.

## 2018-12-31 RX ORDER — PEN NEEDLE, DIABETIC 31 GX3/16"
NEEDLE, DISPOSABLE MISCELLANEOUS
Qty: 100 EACH | Refills: 10 | Status: SHIPPED | OUTPATIENT
Start: 2018-12-31 | End: 2019-02-05 | Stop reason: SDUPTHER

## 2019-01-11 ENCOUNTER — TELEPHONE (OUTPATIENT)
Dept: FAMILY MEDICINE CLINIC | Age: 54
End: 2019-01-11

## 2019-01-11 ENCOUNTER — CARE COORDINATION (OUTPATIENT)
Dept: CARE COORDINATION | Age: 54
End: 2019-01-11

## 2019-01-21 ENCOUNTER — CARE COORDINATION (OUTPATIENT)
Dept: CARE COORDINATION | Age: 54
End: 2019-01-21

## 2019-01-21 ENCOUNTER — TELEPHONE (OUTPATIENT)
Dept: FAMILY MEDICINE CLINIC | Age: 54
End: 2019-01-21

## 2019-02-05 ENCOUNTER — OFFICE VISIT (OUTPATIENT)
Dept: FAMILY MEDICINE CLINIC | Age: 54
End: 2019-02-05
Payer: COMMERCIAL

## 2019-02-05 VITALS
SYSTOLIC BLOOD PRESSURE: 118 MMHG | BODY MASS INDEX: 38.24 KG/M2 | DIASTOLIC BLOOD PRESSURE: 76 MMHG | RESPIRATION RATE: 16 BRPM | OXYGEN SATURATION: 98 % | WEIGHT: 224 LBS | HEIGHT: 64 IN | HEART RATE: 81 BPM

## 2019-02-05 DIAGNOSIS — I10 ESSENTIAL HYPERTENSION: ICD-10-CM

## 2019-02-05 DIAGNOSIS — J30.2 SEASONAL ALLERGIES: ICD-10-CM

## 2019-02-05 DIAGNOSIS — F17.210 CIGARETTE SMOKER: ICD-10-CM

## 2019-02-05 DIAGNOSIS — E78.00 PURE HYPERCHOLESTEROLEMIA: ICD-10-CM

## 2019-02-05 DIAGNOSIS — E66.9 OBESITY (BMI 30-39.9): ICD-10-CM

## 2019-02-05 LAB
ANION GAP SERPL CALCULATED.3IONS-SCNC: 13 MMOL/L (ref 3–16)
BUN BLDV-MCNC: 24 MG/DL (ref 7–20)
CALCIUM SERPL-MCNC: 9.8 MG/DL (ref 8.3–10.6)
CHLORIDE BLD-SCNC: 104 MMOL/L (ref 99–110)
CO2: 25 MMOL/L (ref 21–32)
CREAT SERPL-MCNC: 1.2 MG/DL (ref 0.9–1.3)
GFR AFRICAN AMERICAN: >60
GFR NON-AFRICAN AMERICAN: >60
GLUCOSE BLD-MCNC: 97 MG/DL (ref 70–99)
POTASSIUM SERPL-SCNC: 4.4 MMOL/L (ref 3.5–5.1)
SODIUM BLD-SCNC: 142 MMOL/L (ref 136–145)

## 2019-02-05 PROCEDURE — G8482 FLU IMMUNIZE ORDER/ADMIN: HCPCS | Performed by: INTERNAL MEDICINE

## 2019-02-05 PROCEDURE — 2022F DILAT RTA XM EVC RTNOPTHY: CPT | Performed by: INTERNAL MEDICINE

## 2019-02-05 PROCEDURE — 36415 COLL VENOUS BLD VENIPUNCTURE: CPT | Performed by: INTERNAL MEDICINE

## 2019-02-05 PROCEDURE — G8427 DOCREV CUR MEDS BY ELIG CLIN: HCPCS | Performed by: INTERNAL MEDICINE

## 2019-02-05 PROCEDURE — 3017F COLORECTAL CA SCREEN DOC REV: CPT | Performed by: INTERNAL MEDICINE

## 2019-02-05 PROCEDURE — 4004F PT TOBACCO SCREEN RCVD TLK: CPT | Performed by: INTERNAL MEDICINE

## 2019-02-05 PROCEDURE — 99214 OFFICE O/P EST MOD 30 MIN: CPT | Performed by: INTERNAL MEDICINE

## 2019-02-05 PROCEDURE — G8417 CALC BMI ABV UP PARAM F/U: HCPCS | Performed by: INTERNAL MEDICINE

## 2019-02-05 PROCEDURE — 3046F HEMOGLOBIN A1C LEVEL >9.0%: CPT | Performed by: INTERNAL MEDICINE

## 2019-02-05 RX ORDER — BUDESONIDE 0.5 MG/2ML
1 INHALANT ORAL 2 TIMES DAILY
Qty: 180 AMPULE | Refills: 3 | Status: SHIPPED | OUTPATIENT
Start: 2019-02-05 | End: 2019-05-21 | Stop reason: ALTCHOICE

## 2019-02-05 RX ORDER — FLUTICASONE PROPIONATE 50 MCG
1 SPRAY, SUSPENSION (ML) NASAL DAILY
Qty: 3 BOTTLE | Refills: 3 | Status: SHIPPED | OUTPATIENT
Start: 2019-02-05 | End: 2020-03-04

## 2019-02-05 ASSESSMENT — ENCOUNTER SYMPTOMS
ALLERGIC/IMMUNOLOGIC NEGATIVE: 1
RESPIRATORY NEGATIVE: 1
GASTROINTESTINAL NEGATIVE: 1
RHINORRHEA: 1

## 2019-02-06 LAB
ESTIMATED AVERAGE GLUCOSE: 122.6 MG/DL
HBA1C MFR BLD: 5.9 %

## 2019-02-12 ENCOUNTER — CARE COORDINATION (OUTPATIENT)
Dept: CARE COORDINATION | Age: 54
End: 2019-02-12

## 2019-02-13 ENCOUNTER — OFFICE VISIT (OUTPATIENT)
Dept: PULMONOLOGY | Age: 54
End: 2019-02-13
Payer: COMMERCIAL

## 2019-02-13 ENCOUNTER — CARE COORDINATION (OUTPATIENT)
Dept: CARE COORDINATION | Age: 54
End: 2019-02-13

## 2019-02-13 VITALS
WEIGHT: 227 LBS | HEART RATE: 84 BPM | HEIGHT: 64 IN | SYSTOLIC BLOOD PRESSURE: 116 MMHG | RESPIRATION RATE: 16 BRPM | TEMPERATURE: 98.2 F | OXYGEN SATURATION: 97 % | BODY MASS INDEX: 38.76 KG/M2 | DIASTOLIC BLOOD PRESSURE: 73 MMHG

## 2019-02-13 DIAGNOSIS — G47.33 OSA TREATED WITH BIPAP: Primary | ICD-10-CM

## 2019-02-13 DIAGNOSIS — E66.9 OBESITY (BMI 30-39.9): ICD-10-CM

## 2019-02-13 DIAGNOSIS — Z71.89 ENCOUNTER FOR BIPAP USE COUNSELING: ICD-10-CM

## 2019-02-13 PROCEDURE — 99213 OFFICE O/P EST LOW 20 MIN: CPT | Performed by: NURSE PRACTITIONER

## 2019-02-13 PROCEDURE — G8417 CALC BMI ABV UP PARAM F/U: HCPCS | Performed by: NURSE PRACTITIONER

## 2019-02-13 PROCEDURE — G8482 FLU IMMUNIZE ORDER/ADMIN: HCPCS | Performed by: NURSE PRACTITIONER

## 2019-02-13 PROCEDURE — 3017F COLORECTAL CA SCREEN DOC REV: CPT | Performed by: NURSE PRACTITIONER

## 2019-02-13 PROCEDURE — G8427 DOCREV CUR MEDS BY ELIG CLIN: HCPCS | Performed by: NURSE PRACTITIONER

## 2019-02-13 PROCEDURE — 4004F PT TOBACCO SCREEN RCVD TLK: CPT | Performed by: NURSE PRACTITIONER

## 2019-02-13 ASSESSMENT — SLEEP AND FATIGUE QUESTIONNAIRES
HOW LIKELY ARE YOU TO NOD OFF OR FALL ASLEEP WHILE SITTING INACTIVE IN A PUBLIC PLACE: 0
HOW LIKELY ARE YOU TO NOD OFF OR FALL ASLEEP WHILE LYING DOWN TO REST IN THE AFTERNOON WHEN CIRCUMSTANCES PERMIT: 2
HOW LIKELY ARE YOU TO NOD OFF OR FALL ASLEEP WHEN YOU ARE A PASSENGER IN A CAR FOR AN HOUR WITHOUT A BREAK: 0
HOW LIKELY ARE YOU TO NOD OFF OR FALL ASLEEP WHILE SITTING QUIETLY AFTER LUNCH WITHOUT ALCOHOL: 0
HOW LIKELY ARE YOU TO NOD OFF OR FALL ASLEEP WHILE SITTING AND READING: 0
HOW LIKELY ARE YOU TO NOD OFF OR FALL ASLEEP WHILE SITTING AND TALKING TO SOMEONE: 0
HOW LIKELY ARE YOU TO NOD OFF OR FALL ASLEEP IN A CAR, WHILE STOPPED FOR A FEW MINUTES IN TRAFFIC: 0
HOW LIKELY ARE YOU TO NOD OFF OR FALL ASLEEP WHILE WATCHING TV: 0
ESS TOTAL SCORE: 2
NECK CIRCUMFERENCE (INCHES): 19.5

## 2019-02-14 ENCOUNTER — TELEPHONE (OUTPATIENT)
Dept: FAMILY MEDICINE CLINIC | Age: 54
End: 2019-02-14

## 2019-02-15 ENCOUNTER — CARE COORDINATION (OUTPATIENT)
Dept: CARE COORDINATION | Age: 54
End: 2019-02-15

## 2019-02-22 ENCOUNTER — CARE COORDINATION (OUTPATIENT)
Dept: CARE COORDINATION | Age: 54
End: 2019-02-22

## 2019-02-27 ENCOUNTER — CARE COORDINATION (OUTPATIENT)
Dept: CARE COORDINATION | Age: 54
End: 2019-02-27

## 2019-02-28 ENCOUNTER — CARE COORDINATION (OUTPATIENT)
Dept: CARE COORDINATION | Age: 54
End: 2019-02-28

## 2019-03-01 ENCOUNTER — CARE COORDINATION (OUTPATIENT)
Dept: CARE COORDINATION | Age: 54
End: 2019-03-01

## 2019-03-19 ENCOUNTER — CARE COORDINATION (OUTPATIENT)
Dept: CARE COORDINATION | Age: 54
End: 2019-03-19

## 2019-04-04 ENCOUNTER — CARE COORDINATION (OUTPATIENT)
Dept: CARE COORDINATION | Age: 54
End: 2019-04-04

## 2019-04-08 RX ORDER — ALBUTEROL SULFATE 90 UG/1
AEROSOL, METERED RESPIRATORY (INHALATION)
Qty: 18 G | Refills: 11 | Status: SHIPPED | OUTPATIENT
Start: 2019-04-08 | End: 2020-03-24 | Stop reason: SDUPTHER

## 2019-04-14 ENCOUNTER — APPOINTMENT (OUTPATIENT)
Dept: GENERAL RADIOLOGY | Age: 54
End: 2019-04-14
Payer: COMMERCIAL

## 2019-04-14 ENCOUNTER — HOSPITAL ENCOUNTER (EMERGENCY)
Age: 54
Discharge: HOME | End: 2019-04-14
Attending: EMERGENCY MEDICINE
Payer: COMMERCIAL

## 2019-04-14 VITALS
HEIGHT: 63 IN | RESPIRATION RATE: 18 BRPM | TEMPERATURE: 100.2 F | OXYGEN SATURATION: 99 % | DIASTOLIC BLOOD PRESSURE: 81 MMHG | WEIGHT: 150 LBS | SYSTOLIC BLOOD PRESSURE: 107 MMHG | HEART RATE: 94 BPM | BODY MASS INDEX: 26.58 KG/M2

## 2019-04-14 DIAGNOSIS — R50.9 ACUTE FEBRILE ILLNESS: ICD-10-CM

## 2019-04-14 DIAGNOSIS — J06.9 ACUTE UPPER RESPIRATORY INFECTION: Primary | ICD-10-CM

## 2019-04-14 LAB
A/G RATIO: 1.6 (ref 1.1–2.2)
ALBUMIN SERPL-MCNC: 4.1 G/DL (ref 3.4–5)
ALP BLD-CCNC: 48 U/L (ref 40–129)
ALT SERPL-CCNC: 19 U/L (ref 10–40)
ANION GAP SERPL CALCULATED.3IONS-SCNC: 10 MMOL/L (ref 3–16)
AST SERPL-CCNC: 22 U/L (ref 15–37)
BASOPHILS ABSOLUTE: 0 K/UL (ref 0–0.2)
BASOPHILS RELATIVE PERCENT: 0.1 %
BILIRUB SERPL-MCNC: 0.5 MG/DL (ref 0–1)
BUN BLDV-MCNC: 19 MG/DL (ref 7–20)
CALCIUM SERPL-MCNC: 8.5 MG/DL (ref 8.3–10.6)
CHLORIDE BLD-SCNC: 104 MMOL/L (ref 99–110)
CO2: 25 MMOL/L (ref 21–32)
CREAT SERPL-MCNC: 1 MG/DL (ref 0.9–1.3)
EOSINOPHILS ABSOLUTE: 0.1 K/UL (ref 0–0.6)
EOSINOPHILS RELATIVE PERCENT: 1.1 %
GFR AFRICAN AMERICAN: >60
GFR NON-AFRICAN AMERICAN: >60
GLOBULIN: 2.5 G/DL
GLUCOSE BLD-MCNC: 95 MG/DL (ref 70–99)
HCT VFR BLD CALC: 39.7 % (ref 40.5–52.5)
HEMOGLOBIN: 13.6 G/DL (ref 13.5–17.5)
LACTIC ACID: 1.1 MMOL/L (ref 0.4–2)
LIPASE: 15 U/L (ref 13–60)
LYMPHOCYTES ABSOLUTE: 0.5 K/UL (ref 1–5.1)
LYMPHOCYTES RELATIVE PERCENT: 8.4 %
MCH RBC QN AUTO: 31.5 PG (ref 26–34)
MCHC RBC AUTO-ENTMCNC: 34.2 G/DL (ref 31–36)
MCV RBC AUTO: 92.3 FL (ref 80–100)
MONOCYTES ABSOLUTE: 0.5 K/UL (ref 0–1.3)
MONOCYTES RELATIVE PERCENT: 7.4 %
NEUTROPHILS ABSOLUTE: 5.1 K/UL (ref 1.7–7.7)
NEUTROPHILS RELATIVE PERCENT: 83 %
PDW BLD-RTO: 12.3 % (ref 12.4–15.4)
PLATELET # BLD: 150 K/UL (ref 135–450)
PMV BLD AUTO: 10.5 FL (ref 5–10.5)
POTASSIUM SERPL-SCNC: 3.5 MMOL/L (ref 3.5–5.1)
RAPID INFLUENZA  B AGN: NEGATIVE
RAPID INFLUENZA A AGN: NEGATIVE
RBC # BLD: 4.3 M/UL (ref 4.2–5.9)
REASON FOR REJECTION: NORMAL
REJECTED TEST: NORMAL
SODIUM BLD-SCNC: 139 MMOL/L (ref 136–145)
SPECIMEN STATUS: NORMAL
TOTAL PROTEIN: 6.6 G/DL (ref 6.4–8.2)
WBC # BLD: 6.1 K/UL (ref 4–11)

## 2019-04-14 PROCEDURE — 6370000000 HC RX 637 (ALT 250 FOR IP): Performed by: EMERGENCY MEDICINE

## 2019-04-14 PROCEDURE — 2580000003 HC RX 258: Performed by: EMERGENCY MEDICINE

## 2019-04-14 PROCEDURE — 85025 COMPLETE CBC W/AUTO DIFF WBC: CPT

## 2019-04-14 PROCEDURE — 87804 INFLUENZA ASSAY W/OPTIC: CPT

## 2019-04-14 PROCEDURE — 6360000002 HC RX W HCPCS: Performed by: EMERGENCY MEDICINE

## 2019-04-14 PROCEDURE — 83690 ASSAY OF LIPASE: CPT

## 2019-04-14 PROCEDURE — 96374 THER/PROPH/DIAG INJ IV PUSH: CPT

## 2019-04-14 PROCEDURE — 99285 EMERGENCY DEPT VISIT HI MDM: CPT

## 2019-04-14 PROCEDURE — 94640 AIRWAY INHALATION TREATMENT: CPT

## 2019-04-14 PROCEDURE — 80053 COMPREHEN METABOLIC PANEL: CPT

## 2019-04-14 PROCEDURE — 96375 TX/PRO/DX INJ NEW DRUG ADDON: CPT

## 2019-04-14 PROCEDURE — 2500000003 HC RX 250 WO HCPCS: Performed by: EMERGENCY MEDICINE

## 2019-04-14 PROCEDURE — 96361 HYDRATE IV INFUSION ADD-ON: CPT

## 2019-04-14 PROCEDURE — 71046 X-RAY EXAM CHEST 2 VIEWS: CPT

## 2019-04-14 PROCEDURE — 83605 ASSAY OF LACTIC ACID: CPT

## 2019-04-14 RX ORDER — METHYLPREDNISOLONE SODIUM SUCCINATE 125 MG/2ML
125 INJECTION, POWDER, LYOPHILIZED, FOR SOLUTION INTRAMUSCULAR; INTRAVENOUS ONCE
Status: COMPLETED | OUTPATIENT
Start: 2019-04-14 | End: 2019-04-14

## 2019-04-14 RX ORDER — ONDANSETRON 4 MG/1
4 TABLET, ORALLY DISINTEGRATING ORAL EVERY 8 HOURS PRN
Qty: 20 TABLET | Refills: 0 | Status: SHIPPED | OUTPATIENT
Start: 2019-04-14 | End: 2019-05-21 | Stop reason: ALTCHOICE

## 2019-04-14 RX ORDER — ONDANSETRON 2 MG/ML
4 INJECTION INTRAMUSCULAR; INTRAVENOUS
Status: DISCONTINUED | OUTPATIENT
Start: 2019-04-14 | End: 2019-04-14 | Stop reason: HOSPADM

## 2019-04-14 RX ORDER — KETOROLAC TROMETHAMINE 30 MG/ML
30 INJECTION, SOLUTION INTRAMUSCULAR; INTRAVENOUS ONCE
Status: COMPLETED | OUTPATIENT
Start: 2019-04-14 | End: 2019-04-14

## 2019-04-14 RX ORDER — DEXTROMETHORPHAN HYDROBROMIDE AND PROMETHAZINE HYDROCHLORIDE 15; 6.25 MG/5ML; MG/5ML
5 SYRUP ORAL 4 TIMES DAILY PRN
Qty: 100 ML | Refills: 0 | Status: SHIPPED | OUTPATIENT
Start: 2019-04-14 | End: 2019-04-21

## 2019-04-14 RX ORDER — IPRATROPIUM BROMIDE AND ALBUTEROL SULFATE 2.5; .5 MG/3ML; MG/3ML
1 SOLUTION RESPIRATORY (INHALATION) ONCE
Status: COMPLETED | OUTPATIENT
Start: 2019-04-14 | End: 2019-04-14

## 2019-04-14 RX ORDER — 0.9 % SODIUM CHLORIDE 0.9 %
1000 INTRAVENOUS SOLUTION INTRAVENOUS ONCE
Status: COMPLETED | OUTPATIENT
Start: 2019-04-14 | End: 2019-04-14

## 2019-04-14 RX ORDER — PREDNISONE 10 MG/1
50 TABLET ORAL DAILY
Qty: 25 TABLET | Refills: 0 | Status: SHIPPED | OUTPATIENT
Start: 2019-04-14 | End: 2019-04-19

## 2019-04-14 RX ADMIN — FAMOTIDINE 20 MG: 10 INJECTION, SOLUTION INTRAVENOUS at 14:55

## 2019-04-14 RX ADMIN — KETOROLAC TROMETHAMINE 30 MG: 30 INJECTION, SOLUTION INTRAMUSCULAR; INTRAVENOUS at 14:55

## 2019-04-14 RX ADMIN — SODIUM CHLORIDE 1000 ML: 9 INJECTION, SOLUTION INTRAVENOUS at 14:54

## 2019-04-14 RX ADMIN — IPRATROPIUM BROMIDE AND ALBUTEROL SULFATE 1 AMPULE: .5; 3 SOLUTION RESPIRATORY (INHALATION) at 15:12

## 2019-04-14 RX ADMIN — ONDANSETRON 4 MG: 2 INJECTION INTRAMUSCULAR; INTRAVENOUS at 14:55

## 2019-04-14 RX ADMIN — METHYLPREDNISOLONE SODIUM SUCCINATE 125 MG: 125 INJECTION, POWDER, FOR SOLUTION INTRAMUSCULAR; INTRAVENOUS at 14:54

## 2019-04-14 ASSESSMENT — PAIN SCALES - GENERAL
PAINLEVEL_OUTOF10: 8
PAINLEVEL_OUTOF10: 5
PAINLEVEL_OUTOF10: 5
PAINLEVEL_OUTOF10: 8

## 2019-04-14 ASSESSMENT — PAIN DESCRIPTION - LOCATION: LOCATION: GENERALIZED

## 2019-04-14 ASSESSMENT — PAIN - FUNCTIONAL ASSESSMENT: PAIN_FUNCTIONAL_ASSESSMENT: 0-10

## 2019-04-14 ASSESSMENT — PAIN DESCRIPTION - DESCRIPTORS: DESCRIPTORS: ACHING

## 2019-04-14 NOTE — ED PROVIDER NOTES
[facility] Emergency Department    CHIEF COMPLAINT  Chief Complaint   Patient presents with    Cough     Pt reports coughing for the past week, last night worsened, started having chills/shakes. Pt reports fevers. Pt reports diarrhea, also states has noted some blood in stools. Pt reports fever of 100.8 at home, took tylenol 30mins PTA    Fever    Rectal Bleeding        HISTORY OF PRESENT ILLNESS  Giuseppe Gale is a 47 y.o. male  who presents to the ED complaining of chills and rigors starting yesterday with headache, body aches, and arthralgias. He reports some diarrhea. Sometimes there is blood in the stool but says that is from a hemorrhoid. He reports some generalized abdominal pains and some chest discomfort as well mostly with coughing. His cough is productive of white mucous, thick. Mucinex didn't help. Tmax at home was 100.8F and was told by PCP by phone to come in. He denies syncope but feels lightheaded. No other complaints, modifying factors or associated symptoms. I have reviewed the following from the nursing documentation.     Past Medical History:   Diagnosis Date    Asthma     Diabetes mellitus ([address]     Hyperlipidemia     Hypertension     Microalbuminuria     LIANE on CPAP     LIANE treated with BiPAP     LIANE treated with BiPAP     SOB (shortness of breath)     Umbilical hernia      Past Surgical History:   Procedure Laterality Date    COLONOSCOPY      polyp, hemorroids    HERNIA REPAIR  04/79/99    open umbilical hernia repair with mesh    SPLENECTOMY, PARTIAL      repair of \"busted spleen\"    UMBILICAL HERNIA REPAIR       Family History   Problem Relation Age of Onset    Diabetes Mother     Diabetes Father      Social History     Socioeconomic History    Marital status:      Spouse name: Not on file    Number of children: Not on file    Years of education: Not on file    Highest education level: Not on file   Occupational History    Occupation: window installation   Social Needs    Financial resource strain: Not on file    Food insecurity:     Worry: Not on file     Inability: Not on file    Transportation needs:     Medical: Not on file     Non-medical: Not on file   Tobacco Use    Smoking status: Current Every Day Smoker     Packs/day: 0.50     Years: 30.00     Pack years: 15.00     Types: Cigarettes    Smokeless tobacco: Never Used    Tobacco comment: counseled 5/5/14   Substance and Sexual Activity    Alcohol use: No    Drug use: No    Sexual activity: Not on file   Lifestyle    Physical activity:     Days per week: Not on file     Minutes per session: Not on file    Stress: Not on file   Relationships    Social connections:     Talks on phone: Not on file     Gets together: Not on file     Attends Druze service: Not on file     Active member of club or organization: Not on file     Attends meetings of clubs or organizations: Not on file     Relationship status: Not on file    Intimate partner violence:     Fear of current or ex partner: Not on file     Emotionally abused: Not on file     Physically abused: Not on file     Forced sexual activity: Not on file   Other Topics Concern    Not on file   Social History Narrative    Not on file     Current Facility-Administered Medications   Medication Dose Route Frequency Provider Last Rate Last Dose    ondansetron (ZOFRAN) injection 4 mg  4 mg Intravenous Q1H PRN Noelle Vasquez MD   4 mg at 04/14/19 1455    albuterol (ACCUNEB) nebulizer solution 1.25 mg  1.25 mg Nebulization Once Les Sullivan MD        levalbuterol Harika Cabrales) nebulization 0.63 mg  0.63 mg Nebulization Once Criselda Jj MD         Current Outpatient Medications   Medication Sig Dispense Refill    promethazine-dextromethorphan (PROMETHAZINE-DM) 6.25-15 MG/5ML syrup Take 5 mLs by mouth 4 times daily as needed for Cough 100 mL 0    ondansetron (ZOFRAN ODT) 4 MG disintegrating tablet Take 1 tablet by mouth every 8 hours as needed for Nausea or Vomiting 20 tablet 0    predniSONE (DELTASONE) 10 MG tablet Take 5 tablets by mouth daily for 5 days 25 tablet 0    albuterol sulfate  (90 Base) MCG/ACT inhaler INHALE 2 PUFFS BY MOUTH EVERY 6 HOURS AS NEEDED FOR WHEEZING 18 g 11    Blood Glucose Monitoring Suppl (TRUE METRIX METER) w/Device KIT USE DAILY TO CHECK BLOOD SUGAR 1 kit 0    ONE TOUCH LANCETS MISC 1 each by Does not apply route daily 100 each 3    blood glucose test strips (ONETOUCH VERIO) strip 1 each by In Vitro route daily As needed.  100 each 3    fluticasone (FLONASE) 50 MCG/ACT nasal spray 1 spray by Nasal route daily 3 Bottle 3    Insulin Pen Needle (EASY TOUCH PEN NEEDLES) 31G X 5 MM MISC USE FOR INJECTION DAILY 100 each 10    budesonide (PULMICORT) 0.5 MG/2ML nebulizer suspension Take 2 mLs by nebulization 2 times daily 180 ampule 3    BASAGLAR KWIKPEN 100 UNIT/ML injection pen INJECT 50 UNITS SUBCUTANEOUSLY NIGHTLY 15 mL 11    SYMBICORT 80-4.5 MCG/ACT AERO INHALE 2 PUFFS BY MOUTH TWICE DAILY 30.6 g 11    metoprolol tartrate (LOPRESSOR) 50 MG tablet TAKE ONE (1) TABLET BY MOUTH TWICE DAILY 180 tablet 1    pioglitazone (ACTOS) 30 MG tablet TAKE (1) TABLET BY MOUTH DAILY 90 tablet 1    gabapentin (NEURONTIN) 400 MG capsule TAKE 1 CAPSULE TWICE A DAY ~108Q2 TAKE 2 CAPSULES AT BEDTIME 360 capsule 1    fenofibrate 160 MG tablet TAKE (1) TABLET BY MOUTH DAILY 90 tablet 1    lisinopril (PRINIVIL;ZESTRIL) 5 MG tablet TAKE (1) TABLET BY MOUTH DAILY 90 tablet 1    blood glucose test strips (TRUETEST TEST) strip 1 each by In Vitro route daily Dx e11.9   true test test strips test one time daily 100 each 3    Lancets MISC Test once daily for diabetes e11.9 100 each 3    TRUEPLUS LANCETS 28G MISC USE TO TEST BLOOD SUGAR ONCE DAILY *PT USES TRUE METRIX* 100 each 3    insulin glargine (BASAGLAR KWIKPEN) 100 UNIT/ML injection pen Inject 50 Units into the skin nightly 5 Pen 3    fexofenadine-pseudoephedrine (ALLEGRA-D ALLERGY & CONGESTION)  MG per extended release tablet Take 1 tablet by mouth 2 times daily as needed (allergies and head congestion) 30 tablet 0    Respiratory Therapy Supplies (NEBULIZER COMPRESSOR) KIT 1 kit by Does not apply route daily as needed (use as needed) Use with inhalation solution, DX: J45.41 1 kit 0     Allergies   Allergen Reactions    Ibuprofen Nausea Only    Metformin And Related Diarrhea       REVIEW OF SYSTEMS  10 systems reviewed, pertinent positives per HPI otherwise noted to be negative. PHYSICAL EXAM  /81   Pulse 94   Temp 100.2 °F (37.9 °C) (Oral)   Resp 18   Ht 5' 3\" (1.6 m)   Wt 150 lb (68 kg)   SpO2 99%   BMI 26.57 kg/m²    GENERAL APPEARANCE: Awake and alert. Cooperative. Mild distress. HENT: Normocephalic. Atraumatic. Mucous membranes are dry. NECK: Supple. EYES: PERRL. EOM's grossly intact. HEART/CHEST: Reg rhythm, tachycardia. No murmurs. No chest wall tenderness. LUNGS: Respirations mildly labored with rhonchi and wheezing throughout. ABDOMEN: Minimal diffuse nonfocal tenderness. Soft. Non-distended. No masses. No organomegaly. No guarding or rebound. Hyperactive bowel sounds throughout. MUSCULOSKELETAL: No extremity edema. Compartments soft. No deformity. No tenderness in the extremities. All extremities neurovascularly intact. SKIN: Warm and dry. No acute rashes. NEUROLOGICAL: Alert and oriented. CN's 2-12 intact. No gross facial drooping. Strength 5/5, sensation intact. 2 plus DTR's in knees bilaterally. Gait normal.  PSYCHIATRIC: Normal mood and affect. LABS  I have reviewed all labs for this visit.    Results for orders placed or performed during the hospital encounter of 04/14/19   Rapid influenza A/B antigens   Result Value Ref Range    Rapid Influenza A Ag Negative Negative    Rapid Influenza B Ag Negative Negative   CBC Auto Differential   Result Value Ref Range    WBC 6.1 4.0 - 11.0 K/uL RBC 4.30 4.20 - 5.90 M/uL    Hemoglobin 13.6 13.5 - 17.5 g/dL    Hematocrit 39.7 (L) 40.5 - 52.5 %    MCV 92.3 80.0 - 100.0 fL    MCH 31.5 26.0 - 34.0 pg    MCHC 34.2 31.0 - 36.0 g/dL    RDW 12.3 (L) 12.4 - 15.4 %    Platelets 829 201 - 220 K/uL    MPV 10.5 5.0 - 10.5 fL    Neutrophils % 83.0 %    Lymphocytes % 8.4 %    Monocytes % 7.4 %    Eosinophils % 1.1 %    Basophils % 0.1 %    Neutrophils # 5.1 1.7 - 7.7 K/uL    Lymphocytes # 0.5 (L) 1.0 - 5.1 K/uL    Monocytes # 0.5 0.0 - 1.3 K/uL    Eosinophils # 0.1 0.0 - 0.6 K/uL    Basophils # 0.0 0.0 - 0.2 K/uL   Lactic Acid, Plasma   Result Value Ref Range    Lactic Acid 1.1 0.4 - 2.0 mmol/L   Sample possible blood bank testing   Result Value Ref Range    Specimen Status GISELLA    SPECIMEN REJECTION   Result Value Ref Range    Rejected Test CMP LIPAS     Reason for Rejection see below    Comprehensive Metabolic Panel   Result Value Ref Range    Sodium 139 136 - 145 mmol/L    Potassium 3.5 3.5 - 5.1 mmol/L    Chloride 104 99 - 110 mmol/L    CO2 25 21 - 32 mmol/L    Anion Gap 10 3 - 16    Glucose 95 70 - 99 mg/dL    BUN 19 7 - 20 mg/dL    CREATININE 1.0 0.9 - 1.3 mg/dL    GFR Non-African American >60 >60    GFR African American >60 >60    Calcium 8.5 8.3 - 10.6 mg/dL    Total Protein 6.6 6.4 - 8.2 g/dL    Alb 4.1 3.4 - 5.0 g/dL    Albumin/Globulin Ratio 1.6 1.1 - 2.2    Total Bilirubin 0.5 0.0 - 1.0 mg/dL    Alkaline Phosphatase 48 40 - 129 U/L    ALT 19 10 - 40 U/L    AST 22 15 - 37 U/L    Globulin 2.5 g/dL   Lipase   Result Value Ref Range    Lipase 15.0 13.0 - 60.0 U/L     RADIOLOGY    Xr Chest Standard (2 Vw)    Result Date: 4/14/2019  EXAMINATION: TWO VIEWS OF THE CHEST 4/14/2019 2:25 pm COMPARISON: None.  HISTORY: ORDERING SYSTEM PROVIDED HISTORY: cough TECHNOLOGIST PROVIDED HISTORY: Reason for exam:->cough Ordering Physician Provided Reason for Exam: COUGH, WEAKNESS, HEADACHE Acuity: Acute Type of Exam: Initial FINDINGS: No infiltrate or consolidation or effusion is condition. I have discussed the findings of today's workup with the patient and addressed the patient's questions and concerns. Important warning signs as well as new or worsening symptoms which would necessitate immediate return to the ED were discussed. The plan is to discharge from the ED at this time, and the patient is in stable condition. The patient acknowledged understanding is agreeable with this plan. Patient was given scripts for the following medications. I counseled patient how to take these medications. New Prescriptions    ONDANSETRON (ZOFRAN ODT) 4 MG DISINTEGRATING TABLET    Take 1 tablet by mouth every 8 hours as needed for Nausea or Vomiting    PREDNISONE (DELTASONE) 10 MG TABLET    Take 5 tablets by mouth daily for 5 days    PROMETHAZINE-DEXTROMETHORPHAN (PROMETHAZINE-DM) 6.25-15 MG/5ML SYRUP    Take 5 mLs by mouth 4 times daily as needed for Cough       Follow-up with:  Ryne Henry MD  [address] [place] [address]    Schedule an appointment as soon as possible for a visit in 1 week  For symptom re-evaluation    [facility]  ED  [address] [facility] [address] [facility]  Go to   If symptoms worsen      DISCLAIMER: This chart was created using Dragon dictation software. Efforts were made by me to ensure accuracy, however some errors may be present due to limitations of this technology and occasionally words are not transcribed correctly.         Jenae Morris MD  04/14/19 3948

## 2019-04-15 ENCOUNTER — CARE COORDINATION (OUTPATIENT)
Dept: CARE COORDINATION | Age: 54
End: 2019-04-15

## 2019-04-15 NOTE — CARE COORDINATION
Ambulatory Care Coordination  ED Follow up Call    Reason for ED visit:  SOB   Status:     Unable to contact    Did you call your PCP prior to going to the ED? No per chart review     Did you receive a discharge instructions from the Emergency Room? Yes per chart review  Review of Instructions:     Understands what to report/when to return?:  Yes   Understands discharge instructions?:  Yes      New Pain Meds? No    Constipation prophylaxis needed? N/A    If you have a wound is the dressing clean, dry, and intact? N/A  Understands wound care regimen? N/A    Are there any other complaints/concerns that you wish to tell your provider? Attempted to call. Mailbox full. FU appts/Provider:    Future Appointments   Date Time Provider Lianna Calvo   5/14/2019  7:40 AM MD Shelia Velazco  [facility]   2/12/2020  9:00 AM NICK Fitzgerald - CNP [facility]           New Medications?:   No      Medication Reconciliation by phone - No and added zofran,prednisone and promethazine cough syrup    Any further needs in the home i.e. Equipment?   No    Link to services in community?:  No   Which services:  na

## 2019-04-16 ENCOUNTER — CARE COORDINATION (OUTPATIENT)
Dept: CARE COORDINATION | Age: 54
End: 2019-04-16

## 2019-04-16 NOTE — CARE COORDINATION
Ambulatory Care Coordination Note  4/16/2019  CM Risk Score: 6  Yari Mortality Risk Score:      ACC: Abdirashid Rock RN    Summary Note: ER f/u appt scheduled. Reports is feeling much better and no more fevers or sob. Reports fsbs are doing about the same. No changes in medications and taking them as ordered. PLAN:  1) Review fs  2) FU appts/labs  3)  Reviewed diabetic, low fat, low sodium diets. Diabetes Assessment    Medic Alert ID:  No  Meal Planning:  Plate Method, Avoidance of concentrated sweets, Carb counting   How often do you test your blood sugar?:  Daily   Do you have barriers with adherence to non-pharmacologic self-management interventions? (Nutrition/Exercise/Self-Monitoring):  No   Have you ever had to go to the ED for symptoms of low blood sugar?:  No       No patient-reported symptoms                Care Coordination Interventions    Program Enrollment:  Complex Care  Referral from Primary Care Provider:  Yes  Suggested Interventions and Community Resources  Diabetes Education:  Completed  Registered Dietician:  Completed (Comment: 2/13/19-mai Sesay)  Zone Management Tools:  Completed         Goals Addressed                 This Visit's Progress     Patient Stated (pt-stated)   Improving     Decrease carbs    Barriers: lack of education  Plan for overcoming my barriers: Meet with RD and ACC  Confidence: 8/10  Anticipated Goal Completion Date: 5/15/19 reassessed goal date            Prior to Admission medications    Medication Sig Start Date End Date Taking?  Authorizing Provider   promethazine-dextromethorphan (PROMETHAZINE-DM) 6.25-15 MG/5ML syrup Take 5 mLs by mouth 4 times daily as needed for Cough 4/14/19 4/21/19  Noelle Vasquez MD   ondansetron (ZOFRAN ODT) 4 MG disintegrating tablet Take 1 tablet by mouth every 8 hours as needed for Nausea or Vomiting 4/14/19   Noelle Vasquez MD   predniSONE (DELTASONE) 10 MG tablet Take 5 tablets by mouth daily for 5 days 4/14/19 4/19/19  Abigail Sevilla MD   albuterol sulfate  (90 Base) MCG/ACT inhaler INHALE 2 PUFFS BY MOUTH EVERY 6 HOURS AS NEEDED FOR WHEEZING 4/8/19   JOSSELIN Tolentino MD   Blood Glucose Monitoring Suppl (TRUE METRIX METER) w/Device KIT USE DAILY TO CHECK BLOOD SUGAR 3/6/19   JOSSELIN Tolentino MD   ONE TOUCH LANCETS MISC 1 each by Does not apply route daily 2/5/19   Anita Charlton MD   blood glucose test strips (ONETOUCH VERIO) strip 1 each by In Vitro route daily As needed.  2/5/19   JOSSELIN Tolentino MD   fluticasone (FLONASE) 50 MCG/ACT nasal spray 1 spray by Nasal route daily 2/5/19   JOSSELIN Tolentino MD   Insulin Pen Needle (EASY TOUCH PEN NEEDLES) 31G X 5 MM MISC USE FOR INJECTION DAILY 2/5/19   JOSSELIN Tolentino MD   budesonide (PULMICORT) 0.5 MG/2ML nebulizer suspension Take 2 mLs by nebulization 2 times daily 2/5/19   JOSSELIN Tolentino MD   BASAGLAR KWIKPEN 100 UNIT/ML injection pen INJECT 50 UNITS SUBCUTANEOUSLY NIGHTLY 11/27/18   JOSSELIN Tolentino MD   SYMBICORT 80-4.5 MCG/ACT AERO INHALE 2 PUFFS BY MOUTH TWICE DAILY 11/27/18   JOSSELIN Tolentino MD   metoprolol tartrate (LOPRESSOR) 50 MG tablet TAKE ONE (1) TABLET BY MOUTH TWICE DAILY 11/5/18   JOSSELIN Tolentino MD   pioglitazone (ACTOS) 30 MG tablet TAKE (1) TABLET BY MOUTH DAILY 11/5/18   JOSSELIN Tolentino MD   gabapentin (NEURONTIN) 400 MG capsule TAKE 1 CAPSULE TWICE A DAY ~243O2 TAKE 2 CAPSULES AT BEDTIME 11/5/18 11/5/22  JOSSELIN Tolentino MD   fenofibrate 160 MG tablet TAKE (1) TABLET BY MOUTH DAILY 11/5/18   JOSSELIN Tolentino MD   lisinopril (PRINIVIL;ZESTRIL) 5 MG tablet TAKE (1) TABLET BY MOUTH DAILY 11/5/18   JOSSELIN Tolentino MD   blood glucose test strips (TRUETEST TEST) strip 1 each by In Vitro route daily Dx e11.9   true test test strips test one time daily 10/18/18   C Ángel Tolentino MD   Lancets MISC Test once daily for diabetes e11.9 9/17/18   Anita Charlton MD   TRUEPLUS LANCETS 28G MISC USE TO TEST BLOOD SUGAR ONCE DAILY *PT USES TRUE METRIX* 9/13/18   C Tarah Man MD   insulin glargine [facility]) 100 UNIT/ML injection pen Inject 50 Units into the skin nightly 11/13/17   JOSSELIN Man MD   fexofenadine-pseudoephedrine (ALLEGRA-D ALLERGY & CONGESTION)  MG per extended release tablet Take 1 tablet by mouth 2 times daily as needed (allergies and head congestion) 8/23/17   ADELA Pack   Respiratory Therapy Supplies (NEBULIZER COMPRESSOR) KIT 1 kit by Does not apply route daily as needed (use as needed) Use with inhalation solution, DX: J45.41 12/8/16   JOSSELIN Man MD       Future Appointments   Date Time Provider Lianna Mccalli   4/26/2019  2:40 PM Rossi Daugherty MD [facility]   5/14/2019  7:40 AM JOSSELIN Man MD [facility]   2/12/2020  9:00 AM NICK Agarwal - [facility]

## 2019-05-06 RX ORDER — LISINOPRIL 5 MG/1
TABLET ORAL
Qty: 90 TABLET | Refills: 10 | Status: SHIPPED | OUTPATIENT
Start: 2019-05-06 | End: 2020-05-21

## 2019-05-06 RX ORDER — FENOFIBRATE 160 MG/1
TABLET ORAL
Qty: 90 TABLET | Refills: 10 | Status: SHIPPED | OUTPATIENT
Start: 2019-05-06 | End: 2020-05-29

## 2019-05-06 RX ORDER — METOPROLOL TARTRATE 50 MG/1
TABLET, FILM COATED ORAL
Qty: 180 TABLET | Refills: 10 | Status: SHIPPED | OUTPATIENT
Start: 2019-05-06 | End: 2020-05-29

## 2019-05-06 RX ORDER — PIOGLITAZONEHYDROCHLORIDE 30 MG/1
TABLET ORAL
Qty: 90 TABLET | Refills: 10 | Status: SHIPPED | OUTPATIENT
Start: 2019-05-06 | End: 2020-05-29

## 2019-05-06 RX ORDER — GABAPENTIN 400 MG/1
CAPSULE ORAL
Qty: 360 CAPSULE | Refills: 10 | Status: SHIPPED | OUTPATIENT
Start: 2019-05-06 | End: 2020-05-29

## 2019-05-13 ENCOUNTER — CARE COORDINATION (OUTPATIENT)
Dept: CARE COORDINATION | Age: 54
End: 2019-05-13

## 2019-05-13 NOTE — CARE COORDINATION
Ambulatory Care Coordination Note  5/13/2019  CM Risk Score: 6  Yari Mortality Risk Score:      ACC: Angela Valle. JERSON Rock    Summary Note:   Reports fsbs are doing really good and was 70 this am. No s/s of hypoglycemia. Reviewed diabetic, low fat, low sodium diets. Has increased his water. Has been losing wt. Not getting on a scale but going by loose pants. 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 Aware of appt on 5/21 at 3pm.  PLAN:  1) review fs  2) FU appts/labs  3) FU cough  Diabetes Assessment    Medic Alert ID:  No  Meal Planning:  Plate Method, Avoidance of concentrated sweets, Carb counting   How often do you test your blood sugar?:  Daily   Do you have barriers with adherence to non-pharmacologic self-management interventions? (Nutrition/Exercise/Self-Monitoring):  No   Have you ever had to go to the ED for symptoms of low blood sugar?:  No       No patient-reported symptoms                  Care Coordination Interventions    Program Enrollment:  Complex Care  Referral from Primary Care Provider:  Yes  Suggested Interventions and Community Resources  Diabetes Education:  Completed  Registered Dietician:  Completed (Comment: 2/13/19-w GERRY Huff)  Zone Management Tools:  Completed         Goals Addressed                 This Visit's Progress     Patient Stated (pt-stated)   Improving     Decrease carbs    Barriers: lack of education  Plan for overcoming my barriers: Meet with RD and ACC  Confidence: 8/10  Anticipated Goal Completion Date: 5/15/19 reassessed goal date            Prior to Admission medications    Medication Sig Start Date End Date Taking?  Authorizing Provider   fenofibrate 160 MG tablet TAKE (1) TABLET BY MOUTH DAILY 5/6/19   C Hilaria Perez MD   gabapentin (NEURONTIN) 400 MG capsule TAKE 1 CAPSULE TWICE A DAY ~351W5 TAKE 2 CAPSULES AT BEDTIME 5/6/19 5/6/23  C Hilaria Perez MD   lisinopril (PRINIVIL;ZESTRIL) 5 MG tablet TAKE (1) TABLET BY MOUTH DAILY 5/6/19   C Tarah Man MD   pioglitazone (ACTOS) 30 MG tablet TAKE (1) TABLET BY MOUTH DAILY 5/6/19   C Tarah Man MD   metoprolol tartrate (LOPRESSOR) 50 MG tablet TAKE ONE (1) TABLET BY MOUTH TWICE DAILY 5/6/19   C Tarah Man MD   ondansetron (ZOFRAN ODT) 4 MG disintegrating tablet Take 1 tablet by mouth every 8 hours as needed for Nausea or Vomiting 4/14/19   Siri Santa MD   albuterol sulfate  (90 Base) MCG/ACT inhaler INHALE 2 PUFFS BY MOUTH EVERY 6 HOURS AS NEEDED FOR WHEEZING 4/8/19   C Tarah Man MD   Blood Glucose Monitoring Suppl (TRUE METRIX METER) w/Device KIT USE DAILY TO CHECK BLOOD SUGAR 3/6/19   C Tarah Man MD   ONE TOUCH LANCETS MISC 1 each by Does not apply route daily 2/5/19   Rossi Daugherty MD   blood glucose test strips (ONETOUCH VERIO) strip 1 each by In Vitro route daily As needed.  2/5/19   C Tarah Man MD   fluticasone (FLONASE) 50 MCG/ACT nasal spray 1 spray by Nasal route daily 2/5/19   C Tarah Man MD   Insulin Pen Needle (EASY TOUCH PEN NEEDLES) 31G X 5 MM MISC USE FOR INJECTION DAILY 2/5/19   C Tarah Man MD   budesonide (PULMICORT) 0.5 MG/2ML nebulizer suspension Take 2 mLs by nebulization 2 times daily 2/5/19   C Tarah Man MD   BASAGLAR KWIKPEN 100 UNIT/ML injection pen INJECT 50 UNITS SUBCUTANEOUSLY NIGHTLY 11/27/18   C Tarah Man MD   SYMBICORT 80-4.5 MCG/ACT AERO INHALE 2 PUFFS BY MOUTH TWICE DAILY 11/27/18   C Tarah Man MD   blood glucose test strips (TRUETEST TEST) strip 1 each by In Vitro route daily Dx e11.9   true test test strips test one time daily 10/18/18   C Tarah Man MD   Lancets MISC Test once daily for diabetes e11.9 9/17/18   C Tarah Man MD   TRUEPLUS LANCETS 28G MISC USE TO TEST BLOOD SUGAR ONCE DAILY *PT USES TRUE METRIX* 9/13/18   JOSSELIN Man MD   insulin glargine (BASAGLAR KWIKPEN) 100 UNIT/ML injection pen Inject 50 Units into the skin nightly 11/13/17   JOSSELIN Man MD   fexofenadine-pseudoephedrine (ALLEGRA-D ALLERGY & CONGESTION)  MG per extended release tablet Take 1 tablet by mouth 2 times daily as needed (allergies and head congestion) 8/23/17   ADELA Echols   Respiratory Therapy Supplies (NEBULIZER COMPRESSOR) KIT 1 kit by Does not apply route daily as needed (use as needed) Use with inhalation solution, DX: J45.41 12/8/16   JOSSELIN Hansen MD       Future Appointments   Date Time Provider [facility]   5/14/2019  7:40 AM JOSSELIN Hansen MD [facility]   2/12/2020  9:00 AM NICK Henley - [facility]

## 2019-05-20 ENCOUNTER — CARE COORDINATION (OUTPATIENT)
Dept: CARE COORDINATION | Age: 54
End: 2019-05-20

## 2019-05-20 NOTE — CARE COORDINATION
TAKE (1) TABLET BY MOUTH DAILY 5/6/19   JOSSELIN Gruber MD   metoprolol tartrate (LOPRESSOR) 50 MG tablet TAKE ONE (1) TABLET BY MOUTH TWICE DAILY 5/6/19   C Brittney Gruber MD   ondansetron (ZOFRAN ODT) 4 MG disintegrating tablet Take 1 tablet by mouth every 8 hours as needed for Nausea or Vomiting 4/14/19   Arnoldo Parkinson MD   albuterol sulfate  (90 Base) MCG/ACT inhaler INHALE 2 PUFFS BY MOUTH EVERY 6 HOURS AS NEEDED FOR WHEEZING 4/8/19   JOSSELIN Gruber MD   Blood Glucose Monitoring Suppl (TRUE METRIX METER) w/Device KIT USE DAILY TO CHECK BLOOD SUGAR 3/6/19   C Brittney Gruber MD   ONE TOUCH LANCETS MISC 1 each by Does not apply route daily 2/5/19   Maame Gates MD   blood glucose test strips (ONETOUCH VERIO) strip 1 each by In Vitro route daily As needed.  2/5/19   C Brittney Gruber MD   fluticasone (FLONASE) 50 MCG/ACT nasal spray 1 spray by Nasal route daily 2/5/19   C Brittney Gruber MD   Insulin Pen Needle (EASY TOUCH PEN NEEDLES) 31G X 5 MM MISC USE FOR INJECTION DAILY 2/5/19   C Brittney Gruber MD   budesonide (PULMICORT) 0.5 MG/2ML nebulizer suspension Take 2 mLs by nebulization 2 times daily 2/5/19   C Brittney Gruber MD   BASAGLAR KWIKPEN 100 UNIT/ML injection pen INJECT 50 UNITS SUBCUTANEOUSLY NIGHTLY 11/27/18   JOSSELIN Gruber MD   SYMBICORT 80-4.5 MCG/ACT AERO INHALE 2 PUFFS BY MOUTH TWICE DAILY 11/27/18   JOSSELIN Gruber MD   blood glucose test strips (TRUETEST TEST) strip 1 each by In Vitro route daily Dx e11.9   true test test strips test one time daily 10/18/18   JOSSELIN Gruber MD   Lancets MISC Test once daily for diabetes e11.9 9/17/18   C Brittney Gruber MD   TRUEPLUS LANCETS 28G MISC USE TO TEST BLOOD SUGAR ONCE DAILY *PT USES TRUE METRIX* 9/13/18   JOSSELIN Gruber MD   insulin glargine (BASAGLAR KWIKPEN) 100 UNIT/ML injection pen Inject 50 Units into the skin nightly 11/13/17   JOSSELIN Gruber MD   fexofenadine-pseudoephedrine (ALLEGRA-D ALLERGY & CONGESTION)  MG per extended inhaler INHALE 2 PUFFS BY MOUTH EVERY 6 HOURS AS NEEDED FOR WHEEZING 4/8/19   JOSSELIN Beth MD   Blood Glucose Monitoring Suppl (TRUE METRIX METER) w/Device KIT USE DAILY TO CHECK BLOOD SUGAR 3/6/19   C Gemini Beth MD   ONE TOUCH LANCETS MISC 1 each by Does not apply route daily 2/5/19   Lon Arreguin MD   blood glucose test strips (ONETOUCH VERIO) strip 1 each by In Vitro route daily As needed.  2/5/19   C Gemini Beth MD   fluticasone (FLONASE) 50 MCG/ACT nasal spray 1 spray by Nasal route daily 2/5/19   JOSSELIN Beth MD   Insulin Pen Needle (EASY TOUCH PEN NEEDLES) 31G X 5 MM MISC USE FOR INJECTION DAILY 2/5/19   C Gemini Beth MD   budesonide (PULMICORT) 0.5 MG/2ML nebulizer suspension Take 2 mLs by nebulization 2 times daily 2/5/19   C Gemini Beth MD   BASAGLAR KWIKPEN 100 UNIT/ML injection pen INJECT 50 UNITS SUBCUTANEOUSLY NIGHTLY 11/27/18   C Gemini Beth MD   SYMBICORT 80-4.5 MCG/ACT AERO INHALE 2 PUFFS BY MOUTH TWICE DAILY 11/27/18   C Gemini Beth MD   blood glucose test strips (TRUETEST TEST) strip 1 each by In Vitro route daily Dx e11.9   true test test strips test one time daily 10/18/18   C Gemini Beth MD   Lancets MISC Test once daily for diabetes e11.9 9/17/18   JOSSELIN Beth MD   TRUEPLUS LANCETS 28G MISC USE TO TEST BLOOD SUGAR ONCE DAILY *PT USES TRUE METRIX* 9/13/18   C Gemini Beth MD   insulin glargine (BASAGLAR KWIKPEN) 100 UNIT/ML injection pen Inject 50 Units into the skin nightly 11/13/17   C Gemini Beth MD   fexofenadine-pseudoephedrine (ALLEGRA-D ALLERGY & CONGESTION)  MG per extended release tablet Take 1 tablet by mouth 2 times daily as needed (allergies and head congestion) 8/23/17   ADELA Davis   Respiratory Therapy Supplies (NEBULIZER COMPRESSOR) KIT 1 kit by Does not apply route daily as needed (use as needed) Use with inhalation solution, DX: J45.41 12/8/16   C Gemini Beth MD       Future Appointments   Date Time Provider Lianna Calvo 5/21/2019  3:00 PM JOSSELIN Tolentino MD [facility]   2/12/2020  9:00 AM NICK Borja CNP [facility]

## 2019-05-21 ENCOUNTER — OFFICE VISIT (OUTPATIENT)
Dept: FAMILY MEDICINE CLINIC | Age: 54
End: 2019-05-21
Payer: COMMERCIAL

## 2019-05-21 VITALS
SYSTOLIC BLOOD PRESSURE: 106 MMHG | DIASTOLIC BLOOD PRESSURE: 66 MMHG | WEIGHT: 227.4 LBS | RESPIRATION RATE: 16 BRPM | OXYGEN SATURATION: 98 % | BODY MASS INDEX: 38.82 KG/M2 | HEART RATE: 100 BPM | HEIGHT: 64 IN

## 2019-05-21 DIAGNOSIS — I10 ESSENTIAL HYPERTENSION: ICD-10-CM

## 2019-05-21 DIAGNOSIS — F17.210 CIGARETTE SMOKER: ICD-10-CM

## 2019-05-21 DIAGNOSIS — J45.41 MODERATE PERSISTENT ASTHMA WITH ACUTE EXACERBATION: ICD-10-CM

## 2019-05-21 LAB
ANION GAP SERPL CALCULATED.3IONS-SCNC: 15 MMOL/L (ref 3–16)
BUN BLDV-MCNC: 19 MG/DL (ref 7–20)
CALCIUM SERPL-MCNC: 10.1 MG/DL (ref 8.3–10.6)
CHLORIDE BLD-SCNC: 104 MMOL/L (ref 99–110)
CO2: 24 MMOL/L (ref 21–32)
CREAT SERPL-MCNC: 0.8 MG/DL (ref 0.9–1.3)
GFR AFRICAN AMERICAN: >60
GFR NON-AFRICAN AMERICAN: >60
GLUCOSE BLD-MCNC: 87 MG/DL (ref 70–99)
POTASSIUM SERPL-SCNC: 4.1 MMOL/L (ref 3.5–5.1)
SODIUM BLD-SCNC: 143 MMOL/L (ref 136–145)

## 2019-05-21 PROCEDURE — 36415 COLL VENOUS BLD VENIPUNCTURE: CPT | Performed by: INTERNAL MEDICINE

## 2019-05-21 PROCEDURE — G8417 CALC BMI ABV UP PARAM F/U: HCPCS | Performed by: INTERNAL MEDICINE

## 2019-05-21 PROCEDURE — G8427 DOCREV CUR MEDS BY ELIG CLIN: HCPCS | Performed by: INTERNAL MEDICINE

## 2019-05-21 PROCEDURE — 99214 OFFICE O/P EST MOD 30 MIN: CPT | Performed by: INTERNAL MEDICINE

## 2019-05-21 PROCEDURE — 3017F COLORECTAL CA SCREEN DOC REV: CPT | Performed by: INTERNAL MEDICINE

## 2019-05-21 PROCEDURE — 4004F PT TOBACCO SCREEN RCVD TLK: CPT | Performed by: INTERNAL MEDICINE

## 2019-05-21 PROCEDURE — 2022F DILAT RTA XM EVC RTNOPTHY: CPT | Performed by: INTERNAL MEDICINE

## 2019-05-21 PROCEDURE — 3044F HG A1C LEVEL LT 7.0%: CPT | Performed by: INTERNAL MEDICINE

## 2019-05-21 RX ORDER — DOXYCYCLINE HYCLATE 100 MG
100 TABLET ORAL 2 TIMES DAILY
Qty: 14 TABLET | Refills: 0 | Status: SHIPPED | OUTPATIENT
Start: 2019-05-21 | End: 2019-05-28

## 2019-05-21 RX ORDER — METHYLPREDNISOLONE 4 MG/1
TABLET ORAL
Qty: 1 KIT | Refills: 0 | Status: SHIPPED | OUTPATIENT
Start: 2019-05-21 | End: 2019-05-21 | Stop reason: SDUPTHER

## 2019-05-21 RX ORDER — DOXYCYCLINE HYCLATE 100 MG
100 TABLET ORAL 2 TIMES DAILY
Qty: 14 TABLET | Refills: 0 | Status: SHIPPED | OUTPATIENT
Start: 2019-05-21 | End: 2019-05-21 | Stop reason: SDUPTHER

## 2019-05-21 RX ORDER — METHYLPREDNISOLONE 4 MG/1
TABLET ORAL
Qty: 1 KIT | Refills: 0 | Status: SHIPPED | OUTPATIENT
Start: 2019-05-21 | End: 2019-05-27

## 2019-05-21 ASSESSMENT — ENCOUNTER SYMPTOMS
COUGH: 1
WHEEZING: 1
SHORTNESS OF BREATH: 1
ALLERGIC/IMMUNOLOGIC NEGATIVE: 1
GASTROINTESTINAL NEGATIVE: 1

## 2019-05-21 NOTE — ASSESSMENT & PLAN NOTE
Still smoking ~1/2 pk. Trying to quit. Has severe URI for ~1 mo. Has been on Prednisone and antibiotic from ER and Urgernt care. Still cough, wheezing, Rhinitis.

## 2019-05-21 NOTE — PROGRESS NOTES
MCG/ACT inhaler INHALE 2 PUFFS BY MOUTH EVERY 6 HOURS AS NEEDED FOR WHEEZING 18 g 11    fluticasone (FLONASE) 50 MCG/ACT nasal spray 1 spray by Nasal route daily 3 Bottle 3    BASAGLAR KWIKPEN 100 UNIT/ML injection pen INJECT 50 UNITS SUBCUTANEOUSLY NIGHTLY 15 mL 11    SYMBICORT 80-4.5 MCG/ACT AERO INHALE 2 PUFFS BY MOUTH TWICE DAILY 30.6 g 11    pioglitazone (ACTOS) 30 MG tablet TAKE (1) TABLET BY MOUTH DAILY 90 tablet 10    Blood Glucose Monitoring Suppl (TRUE METRIX METER) w/Device KIT USE DAILY TO CHECK BLOOD SUGAR 1 kit 0    ONE TOUCH LANCETS MISC 1 each by Does not apply route daily 100 each 3    blood glucose test strips (ONETOUCH VERIO) strip 1 each by In Vitro route daily As needed.  100 each 3    Insulin Pen Needle (EASY TOUCH PEN NEEDLES) 31G X 5 MM MISC USE FOR INJECTION DAILY 100 each 10    blood glucose test strips (TRUETEST TEST) strip 1 each by In Vitro route daily Dx e11.9   true test test strips test one time daily 100 each 3    Lancets MISC Test once daily for diabetes e11.9 100 each 3    TRUEPLUS LANCETS 28G MISC USE TO TEST BLOOD SUGAR ONCE DAILY *PT USES TRUE METRIX* 100 each 3    Respiratory Therapy Supplies (NEBULIZER COMPRESSOR) KIT 1 kit by Does not apply route daily as needed (use as needed) Use with inhalation solution, DX: J45.41 1 kit 0     Current Facility-Administered Medications   Medication Dose Route Frequency Provider Last Rate Last Dose    albuterol (ACCUNEB) nebulizer solution 1.25 mg  1.25 mg Nebulization Once Darya Urias MD        levalbuterol Delma Brian) nebulization 0.63 mg  0.63 mg Nebulization Once Sridevi Stephens MD         Allergies   Allergen Reactions    Ibuprofen Nausea Only    Metformin And Related Diarrhea     Social History     Tobacco Use    Smoking status: Current Every Day Smoker     Packs/day: 0.50     Years: 30.00     Pack years: 15.00     Types: Cigarettes    Smokeless tobacco: Never Used    Tobacco comment: counseled 5/5/14   Substance Use Topics    Alcohol use: No     Family History   Problem Relation Age of Onset    Diabetes Mother     Diabetes Father        Review of Systems   Constitutional: Positive for fatigue. Respiratory: Positive for cough, shortness of breath and wheezing. Cardiovascular: Negative. Gastrointestinal: Negative. Endocrine: Negative. Genitourinary: Negative. Musculoskeletal: Negative. Skin: Negative. Allergic/Immunologic: Negative. Neurological: Negative. Hematological: Negative. Psychiatric/Behavioral: Negative. Vitals:    05/21/19 1455 05/21/19 1540   BP: 102/68 106/66   Pulse: 100    Resp: 16    SpO2: 98%    Weight: 227 lb 6.4 oz (103.1 kg)    Height: 5' 4\" (1.626 m)      Objective:   Physical Exam   Constitutional: He is oriented to person, place, and time. Vital signs are normal. He appears well-developed and well-nourished. No distress. HENT:   Head: Normocephalic and atraumatic. Neck: Trachea normal, normal range of motion, full passive range of motion without pain and phonation normal. Neck supple. Normal carotid pulses, no hepatojugular reflux and no JVD present. Carotid bruit is not present. No thyroid mass and no thyromegaly present. Cardiovascular: Normal rate, regular rhythm, normal heart sounds, intact distal pulses and normal pulses. No extrasystoles are present. PMI is not displaced. Exam reveals no gallop, no friction rub and no decreased pulses. No murmur heard. Pulses:       Carotid pulses are 2+ on the right side, and 2+ on the left side. Radial pulses are 2+ on the right side, and 2+ on the left side. Femoral pulses are 2+ on the right side, and 2+ on the left side. Popliteal pulses are 2+ on the right side, and 2+ on the left side. Dorsalis pedis pulses are 2+ on the right side, and 2+ on the left side. Posterior tibial pulses are 2+ on the right side, and 2+ on the left side.    Pulmonary/Chest: Effort normal. No accessory muscle usage or stridor. No apnea, no tachypnea and no bradypnea. No respiratory distress. He has no decreased breath sounds. He has wheezes. He has no rhonchi. He has no rales. He exhibits no tenderness. Abdominal: Normal appearance and normal aorta. There is no hepatosplenomegaly. There is no CVA tenderness. No hernia. Hernia confirmed negative in the ventral area. Neurological: He is alert and oriented to person, place, and time. He has normal strength. He is not disoriented. He displays no atrophy and no tremor. No cranial nerve deficit or sensory deficit. He exhibits normal muscle tone. He displays a negative Romberg sign. Coordination normal.   Skin: Skin is warm, dry and intact. No abrasion and no rash noted. He is not diaphoretic. No cyanosis. No pallor. Nails show no clubbing. Psychiatric: He has a normal mood and affect. His speech is normal and behavior is normal. Judgment and thought content normal. Cognition and memory are normal.       Assessment:      Problem   Cigarette Smoker. Must stop. Moderate Persistent Asthma With Acute Exacerbation. Worse for last month   DM Type 2, Uncontrolled, With Neuropathy (Hcc). Good w/ meds. Essential Hypertension. Good w/ meds. Plan:      All above problems reviewed and the found to be unchanged except for the following :     Cigarette Smoker. MUST STOP!!!!! Call if needs further assistance. Moderate Persistent Asthma With Acute Exacerbation. Stop Pulmicort. Begin Spiriva 2 puffs daily, Continue Advair 2 puffs twice daily, Albuterol Two puffs four times a day and wean as improves. Medrol dose pk as ordered, Doxycycline twice daily for 1 week. If no better in 1 week will need to do CXR. DM Type 2, Uncontrolled, With Neuropathy (Hcc). Will do labs and adjust meds if needed. Improve diet and lose weight. Essential Hypertension. Continue present meds. Home BP checks. Call if>140/90. Improve diet. Avoid caffeine and salt.  Call if new c/o w/ meds.                Adam Go MD

## 2019-05-22 LAB
ESTIMATED AVERAGE GLUCOSE: 125.5 MG/DL
HBA1C MFR BLD: 6 %

## 2019-05-23 ENCOUNTER — TELEPHONE (OUTPATIENT)
Dept: FAMILY MEDICINE CLINIC | Age: 54
End: 2019-05-23

## 2019-05-24 NOTE — TELEPHONE ENCOUNTER
Received APPROVAL for Spiriva Respimat 2. 5MCG/ACT aerosol for 366 days. Please advise patient. Thank you.

## 2019-06-05 ENCOUNTER — CARE COORDINATION (OUTPATIENT)
Dept: CARE COORDINATION | Age: 54
End: 2019-06-05

## 2019-06-17 ENCOUNTER — CARE COORDINATION (OUTPATIENT)
Dept: CARE COORDINATION | Age: 54
End: 2019-06-17

## 2019-06-17 NOTE — CARE COORDINATION
Date Taking? Authorizing Provider   tiotropium (SPIRIVA RESPIMAT) 2.5 MCG/ACT AERS inhaler Inhale 2 puffs into the lungs daily 5/21/19   JOSSELIN Tolentino MD   fenofibrate 160 MG tablet TAKE (1) TABLET BY MOUTH DAILY 5/6/19   JOSSELIN Tolentino MD   gabapentin (NEURONTIN) 400 MG capsule TAKE 1 CAPSULE TWICE A DAY ~467P6 TAKE 2 CAPSULES AT BEDTIME 5/6/19 5/6/23  JOSSELIN Tolentino MD   lisinopril (PRINIVIL;ZESTRIL) 5 MG tablet TAKE (1) TABLET BY MOUTH DAILY 5/6/19   JOSSELIN Tolention MD   pioglitazone (ACTOS) 30 MG tablet TAKE (1) TABLET BY MOUTH DAILY 5/6/19   JOSSELIN Tolentino MD   metoprolol tartrate (LOPRESSOR) 50 MG tablet TAKE ONE (1) TABLET BY MOUTH TWICE DAILY 5/6/19   JOSSELIN Tolentino MD   albuterol sulfate  (90 Base) MCG/ACT inhaler INHALE 2 PUFFS BY MOUTH EVERY 6 HOURS AS NEEDED FOR WHEEZING 4/8/19   JOSSELIN Tolentino MD   Blood Glucose Monitoring Suppl (TRUE METRIX METER) w/Device KIT USE DAILY TO CHECK BLOOD SUGAR 3/6/19   JOSSELIN Tolentino MD   ONE TOUCH LANCETS MISC 1 each by Does not apply route daily 2/5/19   Anita Charlton MD   blood glucose test strips (ONETOUCH VERIO) strip 1 each by In Vitro route daily As needed.  2/5/19   JOSSELIN Tolentino MD   fluticasone (FLONASE) 50 MCG/ACT nasal spray 1 spray by Nasal route daily 2/5/19   JOSSELIN Tolentino MD   Insulin Pen Needle (EASY TOUCH PEN NEEDLES) 31G X 5 MM MISC USE FOR INJECTION DAILY 2/5/19   JOSSELIN Tolentino MD   BASAGLAR KWIKPEN 100 UNIT/ML injection pen INJECT 50 UNITS SUBCUTANEOUSLY NIGHTLY 11/27/18   JOSSELIN Tolentino MD   SYMBICORT 80-4.5 MCG/ACT AERO INHALE 2 PUFFS BY MOUTH TWICE DAILY 11/27/18   JOSSELIN Tolentino MD   blood glucose test strips (TRUETEST TEST) strip 1 each by In Vitro route daily Dx e11.9   true test test strips test one time daily 10/18/18   JOSSELIN Tolentino MD   Lancets MISC Test once daily for diabetes e11.9 9/17/18   Anita Charlton MD   TRUEPLUS LANCETS 28G MISC USE TO TEST BLOOD SUGAR ONCE DAILY *PT USES TRUE METRIX* 9/13/18   JOSSELIN Galo Neel Amos MD   Respiratory Therapy Supplies (NEBULIZER COMPRESSOR) KIT 1 kit by Does not apply route daily as needed (use as needed) Use with inhalation solution, DX: J45.41 12/8/16   JOSSELIN Gruber MD       Future Appointments   Date Time Provider Lianna Calvo   8/22/2019  3:00 PM JOSSELIN Gruber MD [facility]   2/12/2020  9:00 AM NICK Chapman - [facility]

## 2019-07-22 ENCOUNTER — CARE COORDINATION (OUTPATIENT)
Dept: CARE COORDINATION | Age: 54
End: 2019-07-22

## 2019-07-31 ENCOUNTER — CARE COORDINATION (OUTPATIENT)
Dept: CARE COORDINATION | Age: 54
End: 2019-07-31

## 2019-08-22 ENCOUNTER — OFFICE VISIT (OUTPATIENT)
Dept: FAMILY MEDICINE CLINIC | Age: 54
End: 2019-08-22
Payer: COMMERCIAL

## 2019-08-22 VITALS
RESPIRATION RATE: 16 BRPM | HEART RATE: 81 BPM | DIASTOLIC BLOOD PRESSURE: 68 MMHG | SYSTOLIC BLOOD PRESSURE: 110 MMHG | HEIGHT: 64 IN | OXYGEN SATURATION: 98 % | WEIGHT: 222 LBS | BODY MASS INDEX: 37.9 KG/M2

## 2019-08-22 DIAGNOSIS — F17.210 CIGARETTE SMOKER: ICD-10-CM

## 2019-08-22 DIAGNOSIS — I10 ESSENTIAL HYPERTENSION: ICD-10-CM

## 2019-08-22 DIAGNOSIS — F33.0 MILD EPISODE OF RECURRENT MAJOR DEPRESSIVE DISORDER (HCC): ICD-10-CM

## 2019-08-22 DIAGNOSIS — J45.41 MODERATE PERSISTENT ASTHMA WITH ACUTE EXACERBATION: ICD-10-CM

## 2019-08-22 DIAGNOSIS — E78.00 PURE HYPERCHOLESTEROLEMIA: ICD-10-CM

## 2019-08-22 DIAGNOSIS — G47.33 OSA TREATED WITH BIPAP: ICD-10-CM

## 2019-08-22 DIAGNOSIS — M77.12 LATERAL EPICONDYLITIS OF LEFT ELBOW: ICD-10-CM

## 2019-08-22 LAB
ANION GAP SERPL CALCULATED.3IONS-SCNC: 15 MMOL/L (ref 3–16)
BUN BLDV-MCNC: 23 MG/DL (ref 7–20)
CALCIUM SERPL-MCNC: 9.8 MG/DL (ref 8.3–10.6)
CHLORIDE BLD-SCNC: 103 MMOL/L (ref 99–110)
CO2: 23 MMOL/L (ref 21–32)
CREAT SERPL-MCNC: 1 MG/DL (ref 0.9–1.3)
GFR AFRICAN AMERICAN: >60
GFR NON-AFRICAN AMERICAN: >60
GLUCOSE BLD-MCNC: 67 MG/DL (ref 70–99)
POTASSIUM SERPL-SCNC: 3.7 MMOL/L (ref 3.5–5.1)
SODIUM BLD-SCNC: 141 MMOL/L (ref 136–145)

## 2019-08-22 PROCEDURE — 3017F COLORECTAL CA SCREEN DOC REV: CPT | Performed by: INTERNAL MEDICINE

## 2019-08-22 PROCEDURE — 99214 OFFICE O/P EST MOD 30 MIN: CPT | Performed by: INTERNAL MEDICINE

## 2019-08-22 PROCEDURE — G8427 DOCREV CUR MEDS BY ELIG CLIN: HCPCS | Performed by: INTERNAL MEDICINE

## 2019-08-22 PROCEDURE — 4004F PT TOBACCO SCREEN RCVD TLK: CPT | Performed by: INTERNAL MEDICINE

## 2019-08-22 PROCEDURE — 2022F DILAT RTA XM EVC RTNOPTHY: CPT | Performed by: INTERNAL MEDICINE

## 2019-08-22 PROCEDURE — 3044F HG A1C LEVEL LT 7.0%: CPT | Performed by: INTERNAL MEDICINE

## 2019-08-22 PROCEDURE — 36415 COLL VENOUS BLD VENIPUNCTURE: CPT | Performed by: INTERNAL MEDICINE

## 2019-08-22 PROCEDURE — G8417 CALC BMI ABV UP PARAM F/U: HCPCS | Performed by: INTERNAL MEDICINE

## 2019-08-22 ASSESSMENT — ENCOUNTER SYMPTOMS
RESPIRATORY NEGATIVE: 1
GASTROINTESTINAL NEGATIVE: 1
ALLERGIC/IMMUNOLOGIC NEGATIVE: 1

## 2019-08-22 NOTE — PROGRESS NOTES
off meds. Lateral Epicondylitis of Left Elbow. New. Plan:      All above problems reviewed and the found to be unchanged except for the following :     [address] To continue BI-pap. Improve diet and lose weight. Pure Hypercholesterolemia. To improve diet and lose weight. Continue meds. Cigarette Smoker. MUST STOP SMOKING!!!      Moderate Persistent Asthma With Acute Exacerbation. Stop smoking and contiue meds. Call if new c/o. DM Type 2, Uncontrolled, With Neuropathy (Hcc). Will do labs and adjust meds. Must improve diet and lose weight. Essential Hypertension. Continue present meds. Home BP checks. Call if>140/90. Improve diet. Avoid caffeine and salt. Call if new c/o w/ meds. Mild Episode of Recurrent Major Depressive Disorder (Hcc). Stable call if new c/o. Lateral Epicondylitis of Left Elbow. Ice,Tendonitis band, Advil. Call if not improving in 2-3 weeks.               Joann Hubbard MD

## 2019-08-22 NOTE — PATIENT INSTRUCTIONS
All above problems reviewed and the found to be unchanged except for the following :     [address]. To continue BI-pap. Improve diet and lose weight. Pure Hypercholesterolemia. To improve diet and lose weight. Continue meds. Cigarette Smoker. MUST STOP SMOKING!!!      Moderate Persistent Asthma With Acute Exacerbation. Stop smoking and contiue meds. Call if new c/o. DM Type 2, Uncontrolled, With Neuropathy (Hcc). Will do labs and adjust meds. Must improve diet and lose weight. Essential Hypertension. Continue present meds. Home BP checks. Call if>140/90. Improve diet. Avoid caffeine and salt. Call if new c/o w/ meds. Mild Episode of Recurrent Major Depressive Disorder (Hcc). Stable call if new c/o. Lateral Epicondylitis of Left Elbow. Ice,Tendonitis band, Advil. Call if not improving in 2-3 weeks.

## 2019-08-22 NOTE — ASSESSMENT & PLAN NOTE
Seen by Sleep lab last yr may have issue w/ pressure. . Observed apnea. Sleep test abnormal. Doing well w/ BI-pap.

## 2019-08-23 LAB
ESTIMATED AVERAGE GLUCOSE: 119.8 MG/DL
HBA1C MFR BLD: 5.8 %

## 2019-09-13 ENCOUNTER — OFFICE VISIT (OUTPATIENT)
Dept: FAMILY MEDICINE CLINIC | Age: 54
End: 2019-09-13
Payer: COMMERCIAL

## 2019-09-13 VITALS
BODY MASS INDEX: 37.8 KG/M2 | SYSTOLIC BLOOD PRESSURE: 108 MMHG | HEIGHT: 64 IN | OXYGEN SATURATION: 97 % | TEMPERATURE: 97.8 F | DIASTOLIC BLOOD PRESSURE: 68 MMHG | HEART RATE: 83 BPM | WEIGHT: 221.4 LBS

## 2019-09-13 DIAGNOSIS — K52.9 AGE (ACUTE GASTROENTERITIS): Primary | ICD-10-CM

## 2019-09-13 LAB
ALBUMIN SERPL-MCNC: 4.7 G/DL (ref 3.4–5)
ALP BLD-CCNC: 61 U/L (ref 40–129)
ALT SERPL-CCNC: 19 U/L (ref 10–40)
ANION GAP SERPL CALCULATED.3IONS-SCNC: 15 MMOL/L (ref 3–16)
AST SERPL-CCNC: 20 U/L (ref 15–37)
BASOPHILS ABSOLUTE: 0.1 K/UL (ref 0–0.2)
BASOPHILS RELATIVE PERCENT: 0.7 %
BILIRUB SERPL-MCNC: 0.4 MG/DL (ref 0–1)
BILIRUBIN DIRECT: <0.2 MG/DL (ref 0–0.3)
BILIRUBIN, INDIRECT: NORMAL MG/DL (ref 0–1)
BUN BLDV-MCNC: 20 MG/DL (ref 7–20)
C-REACTIVE PROTEIN: 6.3 MG/L (ref 0–5.1)
CALCIUM SERPL-MCNC: 9.7 MG/DL (ref 8.3–10.6)
CHLORIDE BLD-SCNC: 102 MMOL/L (ref 99–110)
CO2: 22 MMOL/L (ref 21–32)
CREAT SERPL-MCNC: 0.8 MG/DL (ref 0.9–1.3)
EOSINOPHILS ABSOLUTE: 0.1 K/UL (ref 0–0.6)
EOSINOPHILS RELATIVE PERCENT: 1 %
GFR AFRICAN AMERICAN: >60
GFR NON-AFRICAN AMERICAN: >60
GLUCOSE BLD-MCNC: 70 MG/DL (ref 70–99)
HCT VFR BLD CALC: 41.9 % (ref 40.5–52.5)
HEMOGLOBIN: 14.3 G/DL (ref 13.5–17.5)
LYMPHOCYTES ABSOLUTE: 1.8 K/UL (ref 1–5.1)
LYMPHOCYTES RELATIVE PERCENT: 18.5 %
MCH RBC QN AUTO: 31.7 PG (ref 26–34)
MCHC RBC AUTO-ENTMCNC: 34.2 G/DL (ref 31–36)
MCV RBC AUTO: 92.9 FL (ref 80–100)
MONOCYTES ABSOLUTE: 0.6 K/UL (ref 0–1.3)
MONOCYTES RELATIVE PERCENT: 5.9 %
NEUTROPHILS ABSOLUTE: 7.4 K/UL (ref 1.7–7.7)
NEUTROPHILS RELATIVE PERCENT: 73.9 %
PDW BLD-RTO: 12.5 % (ref 12.4–15.4)
PLATELET # BLD: 204 K/UL (ref 135–450)
PMV BLD AUTO: 10.7 FL (ref 5–10.5)
POTASSIUM SERPL-SCNC: 4 MMOL/L (ref 3.5–5.1)
RBC # BLD: 4.51 M/UL (ref 4.2–5.9)
SEDIMENTATION RATE, ERYTHROCYTE: 5 MM/HR (ref 0–20)
SODIUM BLD-SCNC: 139 MMOL/L (ref 136–145)
TOTAL PROTEIN: 7 G/DL (ref 6.4–8.2)
WBC # BLD: 10 K/UL (ref 4–11)

## 2019-09-13 PROCEDURE — 99213 OFFICE O/P EST LOW 20 MIN: CPT | Performed by: INTERNAL MEDICINE

## 2019-09-13 PROCEDURE — G8417 CALC BMI ABV UP PARAM F/U: HCPCS | Performed by: INTERNAL MEDICINE

## 2019-09-13 PROCEDURE — 3017F COLORECTAL CA SCREEN DOC REV: CPT | Performed by: INTERNAL MEDICINE

## 2019-09-13 PROCEDURE — 4004F PT TOBACCO SCREEN RCVD TLK: CPT | Performed by: INTERNAL MEDICINE

## 2019-09-13 PROCEDURE — G8427 DOCREV CUR MEDS BY ELIG CLIN: HCPCS | Performed by: INTERNAL MEDICINE

## 2019-09-13 PROCEDURE — 36415 COLL VENOUS BLD VENIPUNCTURE: CPT | Performed by: INTERNAL MEDICINE

## 2019-09-13 ASSESSMENT — ENCOUNTER SYMPTOMS
ALLERGIC/IMMUNOLOGIC NEGATIVE: 1
DIARRHEA: 1
RESPIRATORY NEGATIVE: 1
BLOOD IN STOOL: 1
ABDOMINAL PAIN: 1

## 2019-09-24 ENCOUNTER — OFFICE VISIT (OUTPATIENT)
Dept: FAMILY MEDICINE CLINIC | Age: 54
End: 2019-09-24
Payer: COMMERCIAL

## 2019-09-24 VITALS
HEART RATE: 95 BPM | HEIGHT: 64 IN | DIASTOLIC BLOOD PRESSURE: 62 MMHG | OXYGEN SATURATION: 98 % | WEIGHT: 204 LBS | RESPIRATION RATE: 16 BRPM | BODY MASS INDEX: 34.83 KG/M2 | SYSTOLIC BLOOD PRESSURE: 104 MMHG

## 2019-09-24 DIAGNOSIS — M75.82 TENDINITIS OF LEFT ROTATOR CUFF: ICD-10-CM

## 2019-09-24 DIAGNOSIS — M77.12 LATERAL EPICONDYLITIS OF LEFT ELBOW: ICD-10-CM

## 2019-09-24 PROCEDURE — 3017F COLORECTAL CA SCREEN DOC REV: CPT | Performed by: INTERNAL MEDICINE

## 2019-09-24 PROCEDURE — G8417 CALC BMI ABV UP PARAM F/U: HCPCS | Performed by: INTERNAL MEDICINE

## 2019-09-24 PROCEDURE — 99213 OFFICE O/P EST LOW 20 MIN: CPT | Performed by: INTERNAL MEDICINE

## 2019-09-24 PROCEDURE — G8427 DOCREV CUR MEDS BY ELIG CLIN: HCPCS | Performed by: INTERNAL MEDICINE

## 2019-09-24 PROCEDURE — 4004F PT TOBACCO SCREEN RCVD TLK: CPT | Performed by: INTERNAL MEDICINE

## 2019-09-24 RX ORDER — DICLOFENAC SODIUM 75 MG/1
75 TABLET, DELAYED RELEASE ORAL 2 TIMES DAILY
Qty: 30 TABLET | Refills: 0 | Status: SHIPPED | OUTPATIENT
Start: 2019-09-24 | End: 2019-09-24 | Stop reason: SDUPTHER

## 2019-09-24 RX ORDER — METHYLPREDNISOLONE 4 MG/1
TABLET ORAL
Qty: 1 KIT | Refills: 0 | Status: SHIPPED | OUTPATIENT
Start: 2019-09-24 | End: 2019-09-24 | Stop reason: SDUPTHER

## 2019-09-24 RX ORDER — METHYLPREDNISOLONE 4 MG/1
TABLET ORAL
Qty: 1 KIT | Refills: 0 | Status: SHIPPED | OUTPATIENT
Start: 2019-09-24 | End: 2019-09-30

## 2019-09-24 RX ORDER — DICLOFENAC SODIUM 75 MG/1
75 TABLET, DELAYED RELEASE ORAL 2 TIMES DAILY
Qty: 30 TABLET | Refills: 0 | Status: SHIPPED | OUTPATIENT
Start: 2019-09-24 | End: 2020-08-25

## 2019-09-24 ASSESSMENT — ENCOUNTER SYMPTOMS
RESPIRATORY NEGATIVE: 1
GASTROINTESTINAL NEGATIVE: 1
ALLERGIC/IMMUNOLOGIC NEGATIVE: 1

## 2019-10-22 RX ORDER — DICLOFENAC SODIUM 75 MG/1
TABLET, DELAYED RELEASE ORAL
Qty: 30 TABLET | Refills: 10 | Status: SHIPPED
Start: 2019-10-22 | End: 2020-08-25 | Stop reason: SDUPTHER

## 2019-10-29 RX ORDER — INSULIN GLARGINE 100 [IU]/ML
INJECTION, SOLUTION SUBCUTANEOUS
Qty: 15 ML | Refills: 11 | Status: SHIPPED | OUTPATIENT
Start: 2019-10-29 | End: 2020-10-26

## 2019-10-29 RX ORDER — TIOTROPIUM BROMIDE INHALATION SPRAY 3.12 UG/1
SPRAY, METERED RESPIRATORY (INHALATION)
Qty: 4 G | Refills: 5 | Status: SHIPPED | OUTPATIENT
Start: 2019-10-29 | End: 2019-12-16 | Stop reason: SDUPTHER

## 2019-12-02 RX ORDER — DILTIAZEM HYDROCHLORIDE 60 MG/1
TABLET, FILM COATED ORAL
Qty: 30.6 G | Refills: 10 | Status: SHIPPED | OUTPATIENT
Start: 2019-12-02 | End: 2019-12-16

## 2019-12-16 ENCOUNTER — HOSPITAL ENCOUNTER (OUTPATIENT)
Dept: GENERAL RADIOLOGY | Age: 54
Discharge: HOME | End: 2019-12-16
Payer: COMMERCIAL

## 2019-12-16 ENCOUNTER — OFFICE VISIT (OUTPATIENT)
Dept: FAMILY MEDICINE CLINIC | Age: 54
End: 2019-12-16
Payer: COMMERCIAL

## 2019-12-16 ENCOUNTER — TELEPHONE (OUTPATIENT)
Dept: FAMILY MEDICINE CLINIC | Age: 54
End: 2019-12-16

## 2019-12-16 VITALS
BODY MASS INDEX: 38.28 KG/M2 | DIASTOLIC BLOOD PRESSURE: 68 MMHG | HEART RATE: 75 BPM | WEIGHT: 224.2 LBS | HEIGHT: 64 IN | RESPIRATION RATE: 18 BRPM | OXYGEN SATURATION: 99 % | SYSTOLIC BLOOD PRESSURE: 114 MMHG

## 2019-12-16 DIAGNOSIS — M79.672 PAIN IN LEFT FOOT: ICD-10-CM

## 2019-12-16 DIAGNOSIS — E78.00 PURE HYPERCHOLESTEROLEMIA: ICD-10-CM

## 2019-12-16 DIAGNOSIS — F17.210 CIGARETTE SMOKER: ICD-10-CM

## 2019-12-16 DIAGNOSIS — F33.0 MILD EPISODE OF RECURRENT MAJOR DEPRESSIVE DISORDER (HCC): ICD-10-CM

## 2019-12-16 DIAGNOSIS — I10 ESSENTIAL HYPERTENSION: ICD-10-CM

## 2019-12-16 DIAGNOSIS — M79.672 PAIN IN LEFT FOOT: Primary | ICD-10-CM

## 2019-12-16 DIAGNOSIS — M72.2 PLANTAR FASCIITIS: ICD-10-CM

## 2019-12-16 LAB
ANION GAP SERPL CALCULATED.3IONS-SCNC: 15 MMOL/L (ref 3–16)
BUN BLDV-MCNC: 31 MG/DL (ref 7–20)
CALCIUM SERPL-MCNC: 9.7 MG/DL (ref 8.3–10.6)
CHLORIDE BLD-SCNC: 102 MMOL/L (ref 99–110)
CO2: 23 MMOL/L (ref 21–32)
CREAT SERPL-MCNC: 0.9 MG/DL (ref 0.9–1.3)
GFR AFRICAN AMERICAN: >60
GFR NON-AFRICAN AMERICAN: >60
GLUCOSE BLD-MCNC: 118 MG/DL (ref 70–99)
POTASSIUM SERPL-SCNC: 4.5 MMOL/L (ref 3.5–5.1)
SODIUM BLD-SCNC: 140 MMOL/L (ref 136–145)

## 2019-12-16 PROCEDURE — 4004F PT TOBACCO SCREEN RCVD TLK: CPT | Performed by: INTERNAL MEDICINE

## 2019-12-16 PROCEDURE — 73620 X-RAY EXAM OF FOOT: CPT

## 2019-12-16 PROCEDURE — G8427 DOCREV CUR MEDS BY ELIG CLIN: HCPCS | Performed by: INTERNAL MEDICINE

## 2019-12-16 PROCEDURE — 36415 COLL VENOUS BLD VENIPUNCTURE: CPT | Performed by: INTERNAL MEDICINE

## 2019-12-16 PROCEDURE — 2022F DILAT RTA XM EVC RTNOPTHY: CPT | Performed by: INTERNAL MEDICINE

## 2019-12-16 PROCEDURE — 99214 OFFICE O/P EST MOD 30 MIN: CPT | Performed by: INTERNAL MEDICINE

## 2019-12-16 PROCEDURE — G8482 FLU IMMUNIZE ORDER/ADMIN: HCPCS | Performed by: INTERNAL MEDICINE

## 2019-12-16 PROCEDURE — 3017F COLORECTAL CA SCREEN DOC REV: CPT | Performed by: INTERNAL MEDICINE

## 2019-12-16 PROCEDURE — G8417 CALC BMI ABV UP PARAM F/U: HCPCS | Performed by: INTERNAL MEDICINE

## 2019-12-16 PROCEDURE — 3044F HG A1C LEVEL LT 7.0%: CPT | Performed by: INTERNAL MEDICINE

## 2019-12-16 RX ORDER — LANCETS 30 GAUGE
EACH MISCELLANEOUS
Qty: 100 EACH | Refills: 3 | Status: SHIPPED | OUTPATIENT
Start: 2019-12-16

## 2019-12-16 RX ORDER — GLUCOSAM/CHON-MSM1/C/MANG/BOSW 500-416.6
TABLET ORAL
Qty: 100 EACH | Refills: 10 | Status: SHIPPED | OUTPATIENT
Start: 2019-12-16

## 2019-12-16 RX ORDER — BUDESONIDE AND FORMOTEROL FUMARATE DIHYDRATE 80; 4.5 UG/1; UG/1
2 AEROSOL RESPIRATORY (INHALATION) DAILY
Qty: 30.6 G | Refills: 10 | Status: SHIPPED | OUTPATIENT
Start: 2019-12-16 | End: 2020-11-16

## 2019-12-16 RX ORDER — VARENICLINE TARTRATE 0.5 MG/1
.5-1 TABLET, FILM COATED ORAL SEE ADMIN INSTRUCTIONS
Qty: 57 TABLET | Refills: 0 | Status: SHIPPED | OUTPATIENT
Start: 2019-12-16 | End: 2020-08-25 | Stop reason: ALTCHOICE

## 2019-12-16 RX ORDER — VARENICLINE TARTRATE 1 MG/1
1 TABLET, FILM COATED ORAL 2 TIMES DAILY
Qty: 60 TABLET | Refills: 5 | Status: SHIPPED | OUTPATIENT
Start: 2019-12-16 | End: 2020-08-25 | Stop reason: ALTCHOICE

## 2019-12-16 ASSESSMENT — ENCOUNTER SYMPTOMS
RESPIRATORY NEGATIVE: 1
ALLERGIC/IMMUNOLOGIC NEGATIVE: 1
EYES NEGATIVE: 1
GASTROINTESTINAL NEGATIVE: 1

## 2019-12-17 ENCOUNTER — TELEPHONE (OUTPATIENT)
Dept: FAMILY MEDICINE CLINIC | Age: 54
End: 2019-12-17

## 2019-12-17 LAB
ESTIMATED AVERAGE GLUCOSE: 131.2 MG/DL
HBA1C MFR BLD: 6.2 %

## 2020-02-11 ENCOUNTER — TELEPHONE (OUTPATIENT)
Dept: PULMONOLOGY | Age: 55
End: 2020-02-11

## 2020-02-19 ENCOUNTER — OFFICE VISIT (OUTPATIENT)
Dept: PULMONOLOGY | Age: 55
End: 2020-02-19
Payer: COMMERCIAL

## 2020-02-19 VITALS
WEIGHT: 225 LBS | SYSTOLIC BLOOD PRESSURE: 107 MMHG | DIASTOLIC BLOOD PRESSURE: 69 MMHG | OXYGEN SATURATION: 98 % | BODY MASS INDEX: 38.41 KG/M2 | TEMPERATURE: 98 F | RESPIRATION RATE: 16 BRPM | HEART RATE: 83 BPM | HEIGHT: 64 IN

## 2020-02-19 PROCEDURE — G8427 DOCREV CUR MEDS BY ELIG CLIN: HCPCS | Performed by: NURSE PRACTITIONER

## 2020-02-19 PROCEDURE — G8482 FLU IMMUNIZE ORDER/ADMIN: HCPCS | Performed by: NURSE PRACTITIONER

## 2020-02-19 PROCEDURE — G8417 CALC BMI ABV UP PARAM F/U: HCPCS | Performed by: NURSE PRACTITIONER

## 2020-02-19 PROCEDURE — 99213 OFFICE O/P EST LOW 20 MIN: CPT | Performed by: NURSE PRACTITIONER

## 2020-02-19 PROCEDURE — 4004F PT TOBACCO SCREEN RCVD TLK: CPT | Performed by: NURSE PRACTITIONER

## 2020-02-19 PROCEDURE — 3017F COLORECTAL CA SCREEN DOC REV: CPT | Performed by: NURSE PRACTITIONER

## 2020-02-19 ASSESSMENT — SLEEP AND FATIGUE QUESTIONNAIRES
HOW LIKELY ARE YOU TO NOD OFF OR FALL ASLEEP WHILE SITTING AND READING: 0
HOW LIKELY ARE YOU TO NOD OFF OR FALL ASLEEP WHILE SITTING QUIETLY AFTER LUNCH WITHOUT ALCOHOL: 1
HOW LIKELY ARE YOU TO NOD OFF OR FALL ASLEEP WHEN YOU ARE A PASSENGER IN A CAR FOR AN HOUR WITHOUT A BREAK: 0
HOW LIKELY ARE YOU TO NOD OFF OR FALL ASLEEP WHILE SITTING AND TALKING TO SOMEONE: 0
ESS TOTAL SCORE: 4
HOW LIKELY ARE YOU TO NOD OFF OR FALL ASLEEP WHILE LYING DOWN TO REST IN THE AFTERNOON WHEN CIRCUMSTANCES PERMIT: 3
HOW LIKELY ARE YOU TO NOD OFF OR FALL ASLEEP WHILE WATCHING TV: 0
HOW LIKELY ARE YOU TO NOD OFF OR FALL ASLEEP WHILE WATCHING TV: 0
HOW LIKELY ARE YOU TO NOD OFF OR FALL ASLEEP WHILE SITTING AND READING: 0
HOW LIKELY ARE YOU TO NOD OFF OR FALL ASLEEP WHILE SITTING INACTIVE IN A PUBLIC PLACE: 0
HOW LIKELY ARE YOU TO NOD OFF OR FALL ASLEEP WHILE SITTING INACTIVE IN A PUBLIC PLACE: 0
HOW LIKELY ARE YOU TO NOD OFF OR FALL ASLEEP WHILE SITTING QUIETLY AFTER LUNCH WITHOUT ALCOHOL: 1
HOW LIKELY ARE YOU TO NOD OFF OR FALL ASLEEP WHILE LYING DOWN TO REST IN THE AFTERNOON WHEN CIRCUMSTANCES PERMIT: 3
HOW LIKELY ARE YOU TO NOD OFF OR FALL ASLEEP WHEN YOU ARE A PASSENGER IN A CAR FOR AN HOUR WITHOUT A BREAK: 0
ESS TOTAL SCORE: 7
HOW LIKELY ARE YOU TO NOD OFF OR FALL ASLEEP IN A CAR, WHILE STOPPED FOR A FEW MINUTES IN TRAFFIC: 3
NECK CIRCUMFERENCE (INCHES): 19.5
HOW LIKELY ARE YOU TO NOD OFF OR FALL ASLEEP WHILE SITTING AND TALKING TO SOMEONE: 0
HOW LIKELY ARE YOU TO NOD OFF OR FALL ASLEEP IN A CAR, WHILE STOPPED FOR A FEW MINUTES IN TRAFFIC: 0

## 2020-02-19 NOTE — PROGRESS NOTES
Patient ID: Ray Guevara is a 54 y.o. male who is being seen today for   Chief Complaint   Patient presents with    Sleep Apnea     1 yr     Self referral    HPI:     Ray Guevara is a 54 y.o. male in office for LIANE follow up. Patient is using BiPAP 7-10 hrs/night. Using humidifier. No snoring on BiPAP. The pressure is well tolerated. The mask is comfortable- full face. States mask no longer irritating his skin. No mask leak. No significant daytime sleepiness. Denies nodding off when driving. No dry nose or throat. No fatigue. Bedtime is 6-7 pm and rise time is 330 am. Sleep onset is few minutes. Wakes up 2 times at night total. 2 nocturia. It takes usually few minutes to fall back a sleep. Rare naps during the day. No headache in am. No weight gain. No caffienated beverages during the day. Occasional alcohol. ESS is 4- discussed with patient.       Sleep Medicine 2/19/2020 2/19/2020 2/13/2019 2/13/2018 12/7/2017 8/30/2017   Sitting and reading 0 0 0 0 0 3   Watching TV 0 0 0 0 2 2   Sitting, inactive in a public place (e.g. a theatre or a meeting) 0 0 0 0 0 2   As a passenger in a car for an hour without a break 0 0 0 0 0 3   Lying down to rest in the afternoon when circumstances permit 3 3 2 0 0 3   Sitting and talking to someone 0 0 0 0 0 2   Sitting quietly after a lunch without alcohol 1 1 0 0 0 3   In a car, while stopped for a few minutes in traffic 0 3 0 0 0 3   Total score 4 7 2 0 2 21   Neck circumference - 19.5 19.5 19.5 18.25 18.5       Past Medical History:  Past Medical History:   Diagnosis Date    Asthma     Diabetes mellitus ([facility] [address]     Hyperlipidemia     Hypertension     Lateral epicondylitis of left elbow 8/22/2019    Microalbuminuria     LIANE on CPAP     LIANE treated with BiPAP     LIANE treated with BiPAP     SOB (shortness of breath)     Umbilical hernia        Past Surgical History:        Procedure Laterality Date    COLONOSCOPY      polyp, hemorroids    HERNIA REPAIR 46/13/16    open umbilical hernia repair with mesh    SPLENECTOMY, PARTIAL      repair of \"busted spleen\"    UMBILICAL HERNIA REPAIR         Allergies:  is allergic to ibuprofen and metformin and related. Social History:    TOBACCO:   reports that he has been smoking cigarettes. He has a 15.00 pack-year smoking history. He has never used smokeless tobacco.  ETOH:   reports no history of alcohol use. Family History:       Problem Relation Age of Onset    Diabetes Mother     Diabetes Father        Current Medications:    Current Outpatient Medications:     Insulin Pen Needle (EASY TOUCH PEN NEEDLES) 31G X 5 MM MISC, USE FOR INJECTION DAILY, Disp: 100 each, Rfl: 10    TRUEPLUS LANCETS 28G MISC, As indicated. , Disp: 100 each, Rfl: 10    Lancets MISC, Test once daily for diabetes e11.9, Disp: 100 each, Rfl: 3    budesonide-formoterol (SYMBICORT) 80-4.5 MCG/ACT AERO, Inhale 2 puffs into the lungs daily, Disp: 30.6 g, Rfl: 10    varenicline (CHANTIX) 0.5 MG tablet, Take 1-2 tablets by mouth See Admin Instructions 0.5mg DAILY for 3 days followed by 0.5mg TWICE DAILY for 4 days followed by 1mg TWICE DAILY, Disp: 57 tablet, Rfl: 0    varenicline (CHANTIX) 1 MG tablet, Take 1 tablet by mouth 2 times daily, Disp: 60 tablet, Rfl: 5    blood glucose test strips (TRUETEST TEST) strip, Dx e11.9 true test test strips test one time daily, Disp: 100 each, Rfl: 3    BASAGLAR KWIKPEN 100 UNIT/ML injection pen, INJECT 50 UNITS SUBCUTANEOUSLY NIGHTLY, Disp: 15 mL, Rfl: 11    diclofenac (VOLTAREN) 75 MG EC tablet, TAKE ONE (1) TABLET BY MOUTH TWICE DAILY WITH FOOD, Disp: 30 tablet, Rfl: 10    Blood Glucose Monitoring Suppl (TRUE METRIX METER) w/Device KIT, USE DAILY TO CHECK BLOOD SUGAR, Disp: 1 kit, Rfl: 0    diclofenac (VOLTAREN) 75 MG EC tablet, Take 1 tablet by mouth 2 times daily With food. , Disp: 30 tablet, Rfl: 0    tiotropium (SPIRIVA RESPIMAT) 2.5 MCG/ACT AERS inhaler, Inhale 2 puffs into the lungs daily, Disp: 1 Inhaler, Rfl: 5    fenofibrate 160 MG tablet, TAKE (1) TABLET BY MOUTH DAILY, Disp: 90 tablet, Rfl: 10    gabapentin (NEURONTIN) 400 MG capsule, TAKE 1 CAPSULE TWICE A DAY ~078K8 TAKE 2 CAPSULES AT BEDTIME, Disp: 360 capsule, Rfl: 10    lisinopril (PRINIVIL;ZESTRIL) 5 MG tablet, TAKE (1) TABLET BY MOUTH DAILY, Disp: 90 tablet, Rfl: 10    pioglitazone (ACTOS) 30 MG tablet, TAKE (1) TABLET BY MOUTH DAILY, Disp: 90 tablet, Rfl: 10    metoprolol tartrate (LOPRESSOR) 50 MG tablet, TAKE ONE (1) TABLET BY MOUTH TWICE DAILY, Disp: 180 tablet, Rfl: 10    albuterol sulfate  (90 Base) MCG/ACT inhaler, INHALE 2 PUFFS BY MOUTH EVERY 6 HOURS AS NEEDED FOR WHEEZING, Disp: 18 g, Rfl: 11    Blood Glucose Monitoring Suppl (TRUE METRIX METER) w/Device KIT, USE DAILY TO CHECK BLOOD SUGAR, Disp: 1 kit, Rfl: 0    ONE TOUCH LANCETS MISC, 1 each by Does not apply route daily, Disp: 100 each, Rfl: 3    fluticasone (FLONASE) 50 MCG/ACT nasal spray, 1 spray by Nasal route daily, Disp: 3 Bottle, Rfl: 3    Respiratory Therapy Supplies (NEBULIZER COMPRESSOR) KIT, 1 kit by Does not apply route daily as needed (use as needed) Use with inhalation solution, DX: J45.41 (Patient not taking: Reported on 9/13/2019), Disp: 1 kit, Rfl: 0      Review of Systems    Objective:   PHYSICAL EXAM:    /69 (Site: Left Upper Arm, Position: Sitting)   Pulse 83   Temp 98 °F (36.7 °C) (Oral)   Resp 16   Ht 5' 4\" (1.626 m)   Wt 225 lb (102.1 kg)   SpO2 98%   BMI 38.62 kg/m²     Physical Exam  Gen: No acute distress. Obese. BMI of 38.62  Eyes: PERRL. No sclera icterus. No conjunctival injection. ENT: No discharge. Pharynx clear. Mallampati class IV. Large tongue with ridging. Neck: Trachea midline. No obvious mass. Neck circumference 19.5\"  Resp: No accessory muscle use. No crackles. No wheezes. No rhonchi. CV: Regular rate. Regular rhythm. No murmur or rub. Skin: Warm and dry. M/S: No cyanosis. No obvious joint deformity.

## 2020-02-19 NOTE — PATIENT INSTRUCTIONS
bedding can prevent good sleep. Evaluate whether or not this is a source of your problem, and make appropriate changes. 13- Make sure your bed and bedroom are quiet and comfortable: A hot room can be uncomfortable. A cooler room, along with enough blankets to stay warm is recommended. Get a blackout shade or wear a slumber mask and wear earplugs or get a \"white noise\" machine for light and noise distractions. 14- Use sunlight to set your biological clock: When you get up in the morning, go outside and turn your face to the sun for 15 minutes. 13- Dont take your worries to bed: Leave worries about job, school, daily life, etc., behind when you go to bed. Some people find it useful to assign a \"worry period\" during the evening or afternoon for these issues. CPAP Equipment Cleaning and Disinfecting Schedule  Equipment Cleaning Frequency Instructions  Disinfecting Frequency   Non-Disposable Filters  Weekly Mild soapy water, Rinse, Air Dry Not Required   Disposable Filters Change as needed  2-4 weeks Do Not Wash Not Required   Hose/tubing Daily Mild soapy water, Rinse, Air Dry Once a week   Mask / Nasal Pillows Daily Mild soapy water, Rinse, Air Dry Once a week   Headgear Weekly Hand wash, Mild soapy water, Rinse, Dry  Not Required   Humidifier Daily Empty water daily  Mild soapy water, Rinse well, Air Dry  Once a week   CPAP Unit As Needed Dust with damp cloth,  No detergents or sprays Not Required         Disinfect (per schedule) with 1 part white vinegar and 3 parts water- soak mask and water chamber for 30 minutes every 1-2 weeks, more often if sick. Allow water/vinegar mixture to run through tubing. Allow all equipment to air dry. Drying Hints:   Always hang tubing away from direct sunlight, as this will cause the tubing to become yellow, brittle and crack over a period of time. DO NOT attach the wet tubing to your CPAP unit to blow-dry it. The moisture from the tubing can drain back into your machine. Moisture in your unit can cause sudden pressure increases or short circuits  DO's and DON'Ts:  - Don't use alcohol-based products to clean your mask, because it can cause the materials to become hard and brittle. - Don't put headgear in the washer or dryer  - Don't use any caustic or household cleaning solutions such as bleach on your CPAP   equipment.  - Do follow the recommended cleaning schedule. - Do change your disposable filter frequently. Adapted From: [email or web address]. shtm.   These are general suggestions for all models please follow specific s recommendations and specific instructions

## 2020-02-25 ENCOUNTER — TELEPHONE (OUTPATIENT)
Dept: FAMILY MEDICINE CLINIC | Age: 55
End: 2020-02-25

## 2020-03-05 RX ORDER — FLUTICASONE PROPIONATE 50 MCG
SPRAY, SUSPENSION (ML) NASAL
Qty: 3 BOTTLE | Refills: 1 | Status: SHIPPED | OUTPATIENT
Start: 2020-03-05 | End: 2020-08-07 | Stop reason: SDUPTHER

## 2020-03-16 ENCOUNTER — TELEPHONE (OUTPATIENT)
Dept: FAMILY MEDICINE CLINIC | Age: 55
End: 2020-03-16

## 2020-03-24 RX ORDER — ALBUTEROL SULFATE 90 UG/1
2 AEROSOL, METERED RESPIRATORY (INHALATION) EVERY 6 HOURS PRN
Qty: 18 G | Refills: 11 | Status: SHIPPED | OUTPATIENT
Start: 2020-03-24 | End: 2021-03-26 | Stop reason: SDUPTHER

## 2020-05-01 RX ORDER — TIOTROPIUM BROMIDE INHALATION SPRAY 3.12 UG/1
SPRAY, METERED RESPIRATORY (INHALATION)
Qty: 4 G | Refills: 10 | Status: SHIPPED | OUTPATIENT
Start: 2020-05-01 | End: 2021-03-26 | Stop reason: SDUPTHER

## 2020-05-21 RX ORDER — LISINOPRIL 5 MG/1
TABLET ORAL
Qty: 90 TABLET | Refills: 11 | Status: SHIPPED | OUTPATIENT
Start: 2020-05-21 | End: 2021-05-18

## 2020-05-22 RX ORDER — LISINOPRIL 5 MG/1
TABLET ORAL
Qty: 90 TABLET | Refills: 10 | Status: SHIPPED
Start: 2020-05-22 | End: 2020-08-25 | Stop reason: SDUPTHER

## 2020-05-26 RX ORDER — CALCIUM CITRATE/VITAMIN D3 200MG-6.25
TABLET ORAL
Qty: 100 EACH | Refills: 10 | Status: SHIPPED | OUTPATIENT
Start: 2020-05-26 | End: 2021-05-18

## 2020-05-29 RX ORDER — GABAPENTIN 400 MG/1
CAPSULE ORAL
Qty: 360 CAPSULE | Refills: 2 | Status: SHIPPED | OUTPATIENT
Start: 2020-05-29 | End: 2020-08-25 | Stop reason: SDUPTHER

## 2020-05-29 RX ORDER — PIOGLITAZONEHYDROCHLORIDE 30 MG/1
TABLET ORAL
Qty: 90 TABLET | Refills: 2 | Status: SHIPPED | OUTPATIENT
Start: 2020-05-29 | End: 2021-02-18

## 2020-05-29 RX ORDER — FENOFIBRATE 160 MG/1
TABLET ORAL
Qty: 90 TABLET | Refills: 2 | Status: SHIPPED | OUTPATIENT
Start: 2020-05-29 | End: 2021-02-18

## 2020-05-29 RX ORDER — METOPROLOL TARTRATE 50 MG/1
TABLET, FILM COATED ORAL
Qty: 180 TABLET | Refills: 2 | Status: SHIPPED | OUTPATIENT
Start: 2020-05-29 | End: 2021-02-18

## 2020-07-07 ENCOUNTER — OFFICE VISIT (OUTPATIENT)
Dept: ORTHOPEDIC SURGERY | Age: 55
End: 2020-07-07
Payer: COMMERCIAL

## 2020-07-07 VITALS — BODY MASS INDEX: 39.69 KG/M2 | HEIGHT: 63 IN | WEIGHT: 224 LBS

## 2020-07-07 PROCEDURE — 99203 OFFICE O/P NEW LOW 30 MIN: CPT | Performed by: PODIATRIST

## 2020-07-07 PROCEDURE — G8427 DOCREV CUR MEDS BY ELIG CLIN: HCPCS | Performed by: PODIATRIST

## 2020-07-07 PROCEDURE — 3017F COLORECTAL CA SCREEN DOC REV: CPT | Performed by: PODIATRIST

## 2020-07-07 PROCEDURE — G8417 CALC BMI ABV UP PARAM F/U: HCPCS | Performed by: PODIATRIST

## 2020-07-07 PROCEDURE — L4360 PNEUMAT WALKING BOOT PRE CST: HCPCS | Performed by: PODIATRIST

## 2020-07-07 PROCEDURE — 4004F PT TOBACCO SCREEN RCVD TLK: CPT | Performed by: PODIATRIST

## 2020-07-07 RX ORDER — METHYLPREDNISOLONE 4 MG/1
TABLET ORAL
Qty: 1 KIT | Refills: 0 | Status: SHIPPED | OUTPATIENT
Start: 2020-07-07 | End: 2020-08-25 | Stop reason: ALTCHOICE

## 2020-07-07 NOTE — PROGRESS NOTES
HISTORY OF PRESENT ILLNESS: This is an initial visit for a 80-year-old male who presents with left heel pain of approximately 7 months duration. Back in November 2019, he was carrying a 4 x 8 foot sheet of plywood when he twisted his left foot and ankle. He had pain immediately at the time but it is only worsened over the last several months. He is to the point where he can barely put weight on it after he starts having pain. Typically it is after he has been sitting or in the mornings he will have a lot of pain. Getting off of it is the only thing that seems to help. He is also concerned about \"clicking\" he experiences with the pain. FAMILY HISTORY: Documented in chart. SOCIAL HISTORY: Documented in chart. REVIEW OF SYSTEMS:  The patient denies any issues with dermatologic, pulmonary, cardiovascular, genitourinary, hematologic, gastrointestinal, neurologic, psychiatric, and HEENT systems. Family History, Social History, and Review of Systems were reviewed from patient history form dated on 7/7/2020 and available in the patient's chart under the MEDIA tab. PHYSICAL EXAMINATION:     The patient is alert and orientated x3. He has mild edema to the medial aspect of the left ankle and heel. There is no erythema or ecchymosis present. He has moderate to severe palpable tenderness at the posterior medial aspect of the left ankle extending into the rear foot as well as the medial surface of the heel. He has palpable pedal pulses bilateral.  His sensation is grossly intact bilateral.  There are no paresthesias at the tarsal tunnel. The remainder of the exam is unremarkable. RADIOGRAPHS: 2 weightbearing x-ray views of the left heel were taken. These do not demonstrate any acute fracture or dislocation. ASSESSMENT: Plantar fasciitis, tibialis posterior tendinitis left. PLAN: The patient was educated on the pathology and its treatment options.      A high tide walker was applied to the patient's left lower extremity. It was recommended that the patient use the boot even at night for sleeping. All weightbearing activity is to be performed in the boot. .  Overall activity is to be decreased. We discussed the importance of rest and taking some time off to see how this will respond to the immobilization. I prescribed a Medrol Dosepak. We will see him back in a week. Procedures    Jessy / Pam Roy Tall Walking Boot     Patient was prescribed a Tall Walking Boot. The left foot will require stabilization / immobilization from this semi-rigid / rigid orthosis to improve their function. The orthosis will assist in protecting the affected area, provide functional support and facilitate healing. Patient was instructed to progress ambulation weight bearing as tolerated in the device. The patient was educated and fit by a healthcare professional with expert knowledge and specialization in brace application while under the direct supervision of the physician. Verbal and written instructions for the use of and application of this item were provided. They were instructed to contact the office immediately should the brace result in increased pain, decreased sensation, increased swelling or worsening of the condition.

## 2020-07-09 ENCOUNTER — TELEPHONE (OUTPATIENT)
Dept: ORTHOPEDIC SURGERY | Age: 55
End: 2020-07-09

## 2020-07-09 NOTE — TELEPHONE ENCOUNTER
07/09/2020  SPLIT CODE [facility] . NO AUTHORIZATION REQUIRED. ONE PER 2 YEARS PER KADE'S NOTES FOR THIS [facility].   AP

## 2020-07-13 ENCOUNTER — TELEPHONE (OUTPATIENT)
Dept: ORTHOPEDIC SURGERY | Age: 55
End: 2020-07-13

## 2020-08-07 RX ORDER — FLUTICASONE PROPIONATE 50 MCG
SPRAY, SUSPENSION (ML) NASAL
Qty: 3 BOTTLE | Refills: 1 | Status: SHIPPED | OUTPATIENT
Start: 2020-08-07 | End: 2021-01-20

## 2020-08-07 NOTE — TELEPHONE ENCOUNTER
Junior Tony [phone] (home)    is requesting refill(s) of medication Fluticasone    to preferred pharmacy [facility] [facility] 12-16-19 (pertaining to medication)   Last refill 03-05-20 (per medication requested)  Next office visit scheduled or attempted Yes  Date I called patient and scheduled, multiple no show.  8-25-20  If No, reason see above

## 2020-08-12 ENCOUNTER — TELEPHONE (OUTPATIENT)
Dept: PHARMACY | Facility: CLINIC | Age: 55
End: 2020-08-12

## 2020-08-12 NOTE — TELEPHONE ENCOUNTER
Davina Riley MD - would patient benefit from statin therapy? Patient currently prescribed fenofibrate 160 mg daily and I cannot see where the patient has ever been prescribed statin therapy. Patient has OV with you on 8/25/2020 and is due for labs (lipid panel, BMP, CBC, hepatic function test, etc). Recommend checking labs at next OV and if appropriate, switch patient from fenofibrate to a moderate-intensity statin. I can contact patient/family to discuss any changes in therapy. Thank you,  Jayda Ferraro, PharmD, [facility]  Direct: [phone]  Department, toll free [phone], option 7    ==============================================================  CLINICAL PHARMACY: STATIN THERAPY REVIEW    SUBJECTIVE:   Identified care gap per [place] [employer] [employer]: statin use in persons with diabetes and adherence     Last Office Visit: 12/16/19    ASSESSMENT  STATIN GAP IDENTIFIED    Per chart review, patient is prescribed fenofibrate 160 mg daily    Lab Results   Component Value Date    CHOL 144 07/03/2018    TRIG 176 (H) 07/03/2018    HDL 37 (L) 07/03/2018    LDLCALC 72 07/03/2018    LDLDIRECT 42 09/06/2016     ALT   Date Value Ref Range Status   09/13/2019 19 10 - 40 U/L Final     AST   Date Value Ref Range Status   09/13/2019 20 15 - 37 U/L Final     The 10-year ASCVD risk score (Amelie Garcia et al., 2013) is: 14.8%    Values used to calculate the score:      Age: 54 years      Sex: Male      Is Non- : No      Diabetic: Yes      Tobacco smoker: Yes      Systolic Blood Pressure: 357 mmHg      Is BP treated: Yes      HDL Cholesterol: 37 mg/dL      Total Cholesterol: 144 mg/dL     Hyperlipidemia Goal: Patient has never been prescribed any-intensity statin therapy.     2019 ADA Guidelines Age:   [facility] [facility]  >/= 36years old:   o No history of ASCVD or 10-year ASCVD risk < 20% - moderate-intensity statin is recommended.   o History of ASCVD or 10-year ASCVD risk > 20% - high-intensity statin is recommended. PLAN  Patient is a candidate for statin therapy (ideally to replace fenofibrate therapy). No documentation to support if he has ever been on a statin or had a reaction/intolerance to a statin in the past. Will send recommendation to PCP today to consider moderate-intensity statin therapy. Patient has OV on 8/25/2020.      Bienvenido Marcano, EllisD, [facility]  Direct: [phone]  Department, toll free [phone], option 7

## 2020-08-14 NOTE — TELEPHONE ENCOUNTER
Thank you for the response! Noted that labs have not been ordered by office staff - took the liberty of entering lipid panel, hepatic function test, HbA1c and BMP. Called and spoke with patient and advised him that he has labs ordered that need to be collected prior to next appt on 8/25/2020. Patient reports that he will get these drawn prior to next appt. Will juan antonio for f/u on 8/25/2020 and review OV notes and plan.      Eliane Bruner, PharmD, [facility]  Direct: [phone]  Department, toll free [phone], option 7

## 2020-08-25 ENCOUNTER — OFFICE VISIT (OUTPATIENT)
Dept: FAMILY MEDICINE CLINIC | Age: 55
End: 2020-08-25
Payer: COMMERCIAL

## 2020-08-25 ENCOUNTER — HOSPITAL ENCOUNTER (OUTPATIENT)
Age: 55
Discharge: HOME | End: 2020-08-25
Payer: COMMERCIAL

## 2020-08-25 VITALS
WEIGHT: 235 LBS | SYSTOLIC BLOOD PRESSURE: 118 MMHG | TEMPERATURE: 97.6 F | DIASTOLIC BLOOD PRESSURE: 72 MMHG | OXYGEN SATURATION: 98 % | HEART RATE: 70 BPM | BODY MASS INDEX: 41.64 KG/M2 | RESPIRATION RATE: 16 BRPM | HEIGHT: 63 IN

## 2020-08-25 PROBLEM — E66.01 CLASS 3 SEVERE OBESITY IN ADULT (HCC): Status: ACTIVE | Noted: 2017-08-30

## 2020-08-25 PROBLEM — E66.813 CLASS 3 SEVERE OBESITY IN ADULT: Status: ACTIVE | Noted: 2017-08-30

## 2020-08-25 LAB
ALBUMIN SERPL-MCNC: 4.3 G/DL (ref 3.4–5)
ALP BLD-CCNC: 65 U/L (ref 40–129)
ALT SERPL-CCNC: 41 U/L (ref 10–40)
ANION GAP SERPL CALCULATED.3IONS-SCNC: 13 MMOL/L (ref 3–16)
AST SERPL-CCNC: 32 U/L (ref 15–37)
BILIRUB SERPL-MCNC: 0.5 MG/DL (ref 0–1)
BILIRUBIN DIRECT: <0.2 MG/DL (ref 0–0.3)
BILIRUBIN, INDIRECT: ABNORMAL MG/DL (ref 0–1)
BUN BLDV-MCNC: 15 MG/DL (ref 7–20)
CALCIUM SERPL-MCNC: 9.6 MG/DL (ref 8.3–10.6)
CHLORIDE BLD-SCNC: 102 MMOL/L (ref 99–110)
CHOLESTEROL, TOTAL: 142 MG/DL (ref 0–199)
CO2: 24 MMOL/L (ref 21–32)
CREAT SERPL-MCNC: 0.9 MG/DL (ref 0.9–1.3)
GFR AFRICAN AMERICAN: >60
GFR NON-AFRICAN AMERICAN: >60
GLUCOSE BLD-MCNC: 127 MG/DL (ref 70–99)
HDLC SERPL-MCNC: 27 MG/DL (ref 40–60)
LDL CHOLESTEROL CALCULATED: ABNORMAL MG/DL
LDL CHOLESTEROL DIRECT: 56 MG/DL
POTASSIUM SERPL-SCNC: 4.1 MMOL/L (ref 3.5–5.1)
SODIUM BLD-SCNC: 139 MMOL/L (ref 136–145)
TOTAL PROTEIN: 6.6 G/DL (ref 6.4–8.2)
TRIGL SERPL-MCNC: 451 MG/DL (ref 0–150)
VLDLC SERPL CALC-MCNC: ABNORMAL MG/DL

## 2020-08-25 PROCEDURE — 83036 HEMOGLOBIN GLYCOSYLATED A1C: CPT

## 2020-08-25 PROCEDURE — G8417 CALC BMI ABV UP PARAM F/U: HCPCS | Performed by: INTERNAL MEDICINE

## 2020-08-25 PROCEDURE — 3017F COLORECTAL CA SCREEN DOC REV: CPT | Performed by: INTERNAL MEDICINE

## 2020-08-25 PROCEDURE — 83721 ASSAY OF BLOOD LIPOPROTEIN: CPT

## 2020-08-25 PROCEDURE — 80076 HEPATIC FUNCTION PANEL: CPT

## 2020-08-25 PROCEDURE — 36415 COLL VENOUS BLD VENIPUNCTURE: CPT

## 2020-08-25 PROCEDURE — 80048 BASIC METABOLIC PNL TOTAL CA: CPT

## 2020-08-25 PROCEDURE — 4004F PT TOBACCO SCREEN RCVD TLK: CPT | Performed by: INTERNAL MEDICINE

## 2020-08-25 PROCEDURE — 3046F HEMOGLOBIN A1C LEVEL >9.0%: CPT | Performed by: INTERNAL MEDICINE

## 2020-08-25 PROCEDURE — 80061 LIPID PANEL: CPT

## 2020-08-25 PROCEDURE — 99214 OFFICE O/P EST MOD 30 MIN: CPT | Performed by: INTERNAL MEDICINE

## 2020-08-25 PROCEDURE — G8427 DOCREV CUR MEDS BY ELIG CLIN: HCPCS | Performed by: INTERNAL MEDICINE

## 2020-08-25 PROCEDURE — 2022F DILAT RTA XM EVC RTNOPTHY: CPT | Performed by: INTERNAL MEDICINE

## 2020-08-25 RX ORDER — DICLOFENAC SODIUM 75 MG/1
TABLET, DELAYED RELEASE ORAL
Qty: 30 TABLET | Refills: 2 | Status: SHIPPED | OUTPATIENT
Start: 2020-08-25 | End: 2020-11-16

## 2020-08-25 RX ORDER — GABAPENTIN 400 MG/1
CAPSULE ORAL
Qty: 360 CAPSULE | Refills: 2 | Status: SHIPPED | OUTPATIENT
Start: 2020-08-25 | End: 2021-04-20

## 2020-08-25 ASSESSMENT — ENCOUNTER SYMPTOMS
GASTROINTESTINAL NEGATIVE: 1
RESPIRATORY NEGATIVE: 1
ALLERGIC/IMMUNOLOGIC NEGATIVE: 1

## 2020-08-25 NOTE — PATIENT INSTRUCTIONS
ASSESSMENT/PLAN:    1. Cigarette smoker  Discussed cessation options. Discussed Gum, Lozenges, cessation therapy. 2. DM type 2, uncontrolled, with neuropathy ([facility] [address]  Will do labs and adjust meds after results are evaluated. To continue to improve diet and lose weight. To follow Diabetic diet. If needs further help w/ Diabetic diet to call and will refer back to dietician. Home sugar checks. To call if sudden change up or down in sugars. To do regular foot checks. Call if new problems. 3. Essential hypertension  Continue present meds. Home BP checks. Call if>140/90. Improve diet. Avoid caffeine and salt. Call if new c/o w/ meds. 4. Mild episode of recurrent major depressive disorder ([facility] [address]  Discussed options. Will call if needs tx.    5. Moderate persistent asthma with acute exacerbation  Still smoking. Is using Spiriva and Symbicort. 6. Severe obstructive sleep apnea  Continue Bi-pap. Call if new c/o.    7. Class 3 severe obesity due to excess calories with serious comorbidity and body mass index (BMI) of 40.0 to 44.9 in adult [facility])  Must improve diet and lose weight. Discussed weight loss options. Weight watchers, weight loss clinic recommended. Calorie count, Noom also options. Increase exercise as tolerated. 8. Pure hypercholesterolemia  Must improve diet and lose weight. Continue med. Will do labs. RTSM in 3 mo.

## 2020-08-25 NOTE — ASSESSMENT & PLAN NOTE
Still smoking ~1/2 pk. Trying to quit. Had severe URI for ~1 mo. Has been on Prednisone and antibiotic from ER and Urgernt care. Still cough, wheezing, Rhinitis.

## 2020-08-25 NOTE — ASSESSMENT & PLAN NOTE
Sugars are good, diet is fair, weight is stable up 30 lbs this week, no change in previously reported moderate neuropathy of feet, no change in vision, no claudication, no foot ulcers, no new skin lesions. No change in medications(Basiglar and Actos). No c/o with meds. Last eye exam 9/19/2019 was good.

## 2020-08-25 NOTE — PROGRESS NOTES
2020    Darin Garcia (:  1965) is a 54 y.o. male, here for evaluation of the following medical concerns:    HPI  Cigarette smoker  Still smoking ~1/2 pk. Trying to quit. Had severe URI for ~1 mo. Has been on Prednisone and antibiotic from ER and Urgernt care. Still cough, wheezing, Rhinitis. DM type 2, uncontrolled, with neuropathy ([address]  Sugars are good, diet is fair, weight is stable up 30 lbs this week, no change in previously reported moderate neuropathy of feet, no change in vision, no claudication, no foot ulcers, no new skin lesions. No change in medications(Basiglar and Actos). No c/o with meds. Last eye exam 2019 was good. Essential hypertension  No change in meds, no c/o with meds, no chest pain, SOB, palpatations, or syncope. Home bp is 110-120s/70-80s. Mild episode of recurrent major depressive disorder (HCC)  Chronic. Not on meds. Pt not ready to take meds. Discussed options. Family and work issues. Moderate persistent asthma with acute exacerbation  Now using Symbicort and Spiriva. cough and congestion much improved. Still smoking. Severe obstructive sleep apnea  Seen by Sleep lab last yr may have issue w/ pressure. . Observed apnea. Sleep test abnormal. Doing well w/ BI-pap. Class 3 severe obesity in adult [facility])  Wt is up 25 lbs in 1 yr. Pure hypercholesterolemia  Wt way up since last visit. Diet fair. No c/o w/ meds. Review of Systems   Constitutional: Negative. Respiratory: Negative. Cardiovascular: Negative. Gastrointestinal: Negative. Endocrine: Negative. Genitourinary: Negative. Musculoskeletal: Negative. Allergic/Immunologic: Negative. Neurological: Negative. Hematological: Negative. Psychiatric/Behavioral: Positive for sleep disturbance. The patient is nervous/anxious. Prior to Visit Medications    Medication Sig Taking?  Authorizing Provider   gabapentin (NEURONTIN) 400 MG capsule Take 1 in am 1 at midday and 2 at bedtime Yes Brodie Mcrae MD   fluticasone (FLONASE) 50 MCG/ACT nasal spray INSTILL 1 SPARY EACH NOSTRIL DAILY Yes Brodie Mcrae MD   fenofibrate (TRIGLIDE) 160 MG tablet TAKE (1) TABLET BY MOUTH DAILY Yes Brodie Mcrae MD   pioglitazone (ACTOS) 30 MG tablet TAKE (1) TABLET BY MOUTH DAILY Yes Brodie Mcrae MD   metoprolol tartrate (LOPRESSOR) 50 MG tablet TAKE 1 TABLET BY MOUTH TWICE DAILY Yes Brodie Mcrae MD   lisinopril (PRINIVIL;ZESTRIL) 5 MG tablet TAKE (1) TABLET BY MOUTH DAILY Yes Brodie Mcrae MD   SPIRIVA RESPIMAT 2.5 MCG/ACT AERS inhaler INHALE TWO (2) PUFFS BY MOUTH INTO THE LUNGS DAILY Yes JOSSELIN Leroy MD   albuterol sulfate  (90 Base) MCG/ACT inhaler Inhale 2 puffs into the lungs every 6 hours as needed for Wheezing Yes Brodie Mcrae MD   budesonide-formoterol (SYMBICORT) 80-4.5 MCG/ACT AERO Inhale 2 puffs into the lungs daily Yes Brodie Mcrae MD   BASAGLAR KWIKPEN 100 UNIT/ML injection pen INJECT 50 UNITS SUBCUTANEOUSLY NIGHTLY Yes JOSSELIN Leroy MD   diclofenac (VOLTAREN) 75 MG EC tablet Take 1 tablet by mouth 2 times daily With food. Yes Brodie Mcrae MD   blood glucose test strips (TRUE METRIX BLOOD GLUCOSE TEST) strip USE TO TEST BLOOD SUGAR ONCE DAILY  JOSSELIN Leroy MD   Insulin Pen Needle (EASY TOUCH PEN NEEDLES) 31G X 5 MM MISC USE FOR INJECTION DAILY  JOSSELIN Leroy MD   TRUEPLUS LANCETS 28G MISC As indicated.   Brodie Mcrae MD   Lancets MISC Test once daily for diabetes e11.9  JOSSELIN Leroy MD   Blood Glucose Monitoring Suppl (TRUE METRIX METER) w/Device KIT USE DAILY TO CHECK BLOOD SUGAR  JOSSELIN Leroy MD   Blood Glucose Monitoring Suppl (TRUE METRIX METER) w/Device KIT USE DAILY TO CHECK BLOOD SUGAR  JOSSELIN Leroy MD   ONE TOUCH LANCETS MISC 1 each by Does not apply route daily  JOSSELIN Leroy MD   Respiratory Therapy Supplies (NEBULIZER COMPRESSOR) KIT 1 kit by Does not apply route daily as needed (use as needed) Use with inhalation solution, DX: J45.41  Patient not taking: Reported on 9/13/2019  JOSSELIN Dela Cruz MD        Allergies   Allergen Reactions    Ibuprofen Nausea Only    Metformin And Related Diarrhea       Past Medical History:   Diagnosis Date    Asthma     Diabetes mellitus ([address]     Hyperlipidemia     Hypertension     Lateral epicondylitis of left elbow 8/22/2019    Microalbuminuria     LIANE on CPAP     LIANE treated with BiPAP     LIANE treated with BiPAP     SOB (shortness of breath)     Umbilical hernia        Past Surgical History:   Procedure Laterality Date    COLONOSCOPY      polyp, hemorroids    HERNIA REPAIR  02/70/49    open umbilical hernia repair with mesh    SPLENECTOMY, PARTIAL      repair of \"busted spleen\"    UMBILICAL HERNIA REPAIR         Social History     Socioeconomic History    Marital status:      Spouse name: Not on file    Number of children: Not on file    Years of education: Not on file    Highest education level: Not on file   Occupational History    Occupation: window installation   Social Needs    Financial resource strain: Not on file    Food insecurity     Worry: Not on file     Inability: Not on file    Transportation needs     Medical: Not on file     Non-medical: Not on file   Tobacco Use    Smoking status: Current Every Day Smoker     Packs/day: 0.50     Years: 30.00     Pack years: 15.00     Types: Cigarettes    Smokeless tobacco: Never Used    Tobacco comment: counseled 5/5/14   Substance and Sexual Activity    Alcohol use: No    Drug use: No    Sexual activity: Not on file   Lifestyle    Physical activity     Days per week: Not on file     Minutes per session: Not on file    Stress: Not on file   Relationships    Social connections     Talks on phone: Not on file     Gets together: Not on file     Attends Quaker service: Not on file     Active member of club or organization: Not on file     Attends meetings of clubs or organizations: Not on file     Relationship status: Not on file    Intimate partner violence     Fear of current or ex partner: Not on file     Emotionally abused: Not on file     Physically abused: Not on file     Forced sexual activity: Not on file   Other Topics Concern    Not on file   Social History Narrative    Not on file        Family History   Problem Relation Age of Onset    Diabetes Mother     Diabetes Father        Vitals:    08/25/20 0743 08/25/20 0804   BP: 122/78 118/72   Pulse: 70    Resp: 16    Temp: 97.6 °F (36.4 °C)    SpO2: 98%    Weight: 235 lb (106.6 kg)    Height: 5' 3\" (1.6 m)      Estimated body mass index is 41.63 kg/m² as calculated from the following:    Height as of this encounter: 5' 3\" (1.6 m). Weight as of this encounter: 235 lb (106.6 kg). Physical Exam  Constitutional:       General: He is not in acute distress. Appearance: Normal appearance. He is well-developed. He is obese. He is not ill-appearing, toxic-appearing or diaphoretic. HENT:      Head: Normocephalic and atraumatic. Eyes:      Extraocular Movements: Extraocular movements intact. Conjunctiva/sclera: Conjunctivae normal.      Pupils: Pupils are equal, round, and reactive to light. Neck:      Musculoskeletal: Full passive range of motion without pain, normal range of motion and neck supple. No neck rigidity or muscular tenderness. Thyroid: No thyroid mass or thyromegaly. Vascular: Normal carotid pulses. No carotid bruit, hepatojugular reflux or JVD. Trachea: Trachea and phonation normal.   Cardiovascular:      Rate and Rhythm: Normal rate and regular rhythm. No extrasystoles are present. Chest Wall: PMI is not displaced. Pulses: Normal pulses. No decreased pulses. Carotid pulses are 2+ on the right side and 2+ on the left side. Radial pulses are 2+ on the right side and 2+ on the left side. Femoral pulses are 2+ on the right side and 2+ on the left side.        Popliteal

## 2020-08-26 LAB
ESTIMATED AVERAGE GLUCOSE: 151.3 MG/DL
HBA1C MFR BLD: 6.9 %

## 2020-08-26 RX ORDER — OMEGA-3-ACID ETHYL ESTERS 1 G/1
2 CAPSULE, LIQUID FILLED ORAL 2 TIMES DAILY
Qty: 360 CAPSULE | Refills: 3 | Status: SHIPPED | OUTPATIENT
Start: 2020-08-26 | End: 2021-08-27

## 2020-09-03 ENCOUNTER — TELEPHONE (OUTPATIENT)
Dept: ADMINISTRATIVE | Age: 55
End: 2020-09-03

## 2020-09-25 ENCOUNTER — VIRTUAL VISIT (OUTPATIENT)
Dept: PULMONOLOGY | Age: 55
End: 2020-09-25
Payer: COMMERCIAL

## 2020-09-25 ENCOUNTER — TELEPHONE (OUTPATIENT)
Dept: PULMONOLOGY | Age: 55
End: 2020-09-25

## 2020-09-25 PROBLEM — E66.01 OBESITY, MORBID, BMI 40.0-49.9 (HCC): Status: ACTIVE | Noted: 2020-09-25

## 2020-09-25 PROCEDURE — 99441 PR PHYS/QHP TELEPHONE EVALUATION 5-10 MIN: CPT | Performed by: NURSE PRACTITIONER

## 2020-09-25 ASSESSMENT — SLEEP AND FATIGUE QUESTIONNAIRES
HOW LIKELY ARE YOU TO NOD OFF OR FALL ASLEEP WHILE SITTING AND TALKING TO SOMEONE: 0
HOW LIKELY ARE YOU TO NOD OFF OR FALL ASLEEP WHILE SITTING INACTIVE IN A PUBLIC PLACE: 0
HOW LIKELY ARE YOU TO NOD OFF OR FALL ASLEEP WHEN YOU ARE A PASSENGER IN A CAR FOR AN HOUR WITHOUT A BREAK: 0
HOW LIKELY ARE YOU TO NOD OFF OR FALL ASLEEP WHILE SITTING QUIETLY AFTER LUNCH WITHOUT ALCOHOL: 0
ESS TOTAL SCORE: 6
HOW LIKELY ARE YOU TO NOD OFF OR FALL ASLEEP IN A CAR, WHILE STOPPED FOR A FEW MINUTES IN TRAFFIC: 1
HOW LIKELY ARE YOU TO NOD OFF OR FALL ASLEEP WHILE WATCHING TV: 1
HOW LIKELY ARE YOU TO NOD OFF OR FALL ASLEEP WHILE SITTING AND READING: 2
HOW LIKELY ARE YOU TO NOD OFF OR FALL ASLEEP WHILE LYING DOWN TO REST IN THE AFTERNOON WHEN CIRCUMSTANCES PERMIT: 2

## 2020-09-25 NOTE — TELEPHONE ENCOUNTER
Within this Telehealth Consent, the terms you and yours refer to the person using the Telehealth Service (Service), or in the case of a use of the Service by or on behalf of a minor, you and yours refer to and include (i) the parent or legal guardian who provides consent to the use of the Service by such minor or uses the Service on behalf of such minor, and (ii) the minor for whom consent is being provided or on whose behalf the Service is being utilized. When using Service, you will be consulting with your health care providers via the use of Telehealth.   Telehealth involves the delivery of healthcare services using electronic communications, information technology or other means between a healthcare provider and a patient who are not in the same physical location. Telehealth may be used for diagnosis, treatment, follow-up and/or patient education, and may include, but is not limited to, one or more of the following:    Electronic transmission of medical records, photo images, personal health information or other data between a patient and a healthcare provider    Interactions between a patient and healthcare provider via audio, video and/or data communications    Use of output data from medical devices, sound and video files    Anticipated Benefits   The use of Telehealth by your Provider(s) through the Service may have the following possible benefits:    Making it easier and more efficient for you to access medical care and treatment for the conditions treated by such Provider(s) utilizing the Service    Allowing you to obtain medical care and treatment by Provider(s) at times that are convenient for you    Enabling you to interact with Provider(s) without the necessity of an in-office appointment     Possible Risks   While the use of Telehealth can provide potential benefits for you, there are also potential risks associated with the use of Telehealth.  These risks include, but may not be limited to the following:    Your Provider(s) may not able to provide medical treatment for your particular condition and you may be required to seek alternative healthcare or emergency care services.  The electronic systems or other security protocols or safeguards used in the Service could fail, causing a breach of privacy of your medical or other information.  Given regulatory requirements in certain jurisdictions, your Provider(s) diagnosis and/or treatment options, especially pertaining to certain prescriptions, may be limited. Acceptance   1. You understand that Services will be provided via Telehealth. This process involves the use of HIPAA compliant and secure, real-time audio-visual interfacing with a qualified and appropriately trained provider located at [facility]. 2. You understand that, under no circumstances, will this session be recorded. 3. You understand that the Provider(s) at [facility] and other clinical participants will be party to the information obtained during the Telehealth session in accordance with best medical practices. 4. You understand that the information obtained during the Telehealth session will be used to help determine the most appropriate treatment options. 5. You understand that You have the right to revoke this consent at any point in time. 6. You understand that Telehealth is voluntary, and that continued treatment is not dependent upon consent. 7. You understand that, in the event of non-consent to Telehealth services and/or technical difficulties, you will obtain services as typically provided in the absence of Telehealth technology. 8. You understand that this consent will be kept in Your medical record. 9. No potential benefits from the use of Telehealth or specific results can be guaranteed. Your condition may not be cured or improved and, in some cases, may get worse.    10. There are limitations in the provision of medical care and treatment via Telehealth and the Service and you may not be able to receive diagnosis and/or treatment through the Service for every condition for which you seek diagnosis and/or treatment. 11. There are potential risks to the use of Telehealth, including but not limited to the risks described in this Telehealth Consent. 12. Your Provider(s) have discussed the use of Telehealth and the Service with you, including the benefits and risks of such and you have provided oral consent to your Provider(s) for the use of Telehealth and the Service. 15. You understand that it is your duty to provide your Provider(s) truthful, accurate and complete information, including all relevant information regarding care that you may have received or may be receiving from other healthcare providers outside of the Service. 14. You understand that each of your Provider(s) may determine in his or sole discretion that your condition is not suitable for diagnosis and/or treatment using the Service, and that you may need to seek medical care and treatment a specialist or other healthcare provider, outside of the Service. 15. You understand that you are fully responsible for payment for all services provided by Provider(s) or through use of the Service and that you may not be able to use third-party insurance. 16. You represent that (a) you have read this Telehealth Consent carefully, (b) you understand the risks and benefits of the Service and the use of Telehealth in the medical care and treatment provided to you by Provider(s) using the Service, and (c) you have the legal capacity and authority to provide this consent for yourself and/or the minor for which you are consenting under applicable federal and state laws, including laws relating to the age of [de-identified] and/or parental/guardian consent.    17. You give your informed consent to the use of Telehealth by Provider(s) using the Service under the terms described in the Terms of Service and this Telehealth Consent. The patient was read the following statement and has consented to the visit as of 9/25/20. The patient has been scheduled for their first telehealth visit on 9/25/20 with Kerwin Heredia.

## 2020-09-25 NOTE — PROGRESS NOTES
Patient ID: Carlota Finley is a 54 y.o. male who is being seen today for   Chief Complaint   Patient presents with    Sleep Apnea     Self referral    HPI:   Carlota Finley is a 54 y.o. male for telephone only virtual visit for LIANE follow up. States he is getting  Some sleepiness again. States started 2 weeks ago. States no new meds, nothing else has changed. States does need a new mask straps keep getting loose and waking him at night. States he feels like mask is the issue. Patient is using BiPAP 8-9 hrs/night. Using humidifier. No snoring on BiPAP. The pressure is well tolerated. The mask is comfortable-full face. Some mask leak. Some daytime sleepiness. No nodding off when driving, can get sleepy. No dry nose or throat. No fatigue. Bedtime is 7-8 pm and rise time is 4 am. Sleep onset is few minutes. Wakes up 4 times at night total-due to mask leak. 1 nocturia. It takes few-20 minutes to fall back a sleep. No naps during the day. No headache in am. No weight gain. No caffienated beverages during the day. No alcohol.  ESS is 6      Sleep Medicine 9/25/2020 2/19/2020 2/19/2020 2/13/2019 2/13/2018 12/7/2017 8/30/2017   Sitting and reading 2 0 0 0 0 0 3   Watching TV 1 0 0 0 0 2 2   Sitting, inactive in a public place (e.g. a theatre or a meeting) 0 0 0 0 0 0 2   As a passenger in a car for an hour without a break 0 0 0 0 0 0 3   Lying down to rest in the afternoon when circumstances permit 2 3 3 2 0 0 3   Sitting and talking to someone 0 0 0 0 0 0 2   Sitting quietly after a lunch without alcohol 0 1 1 0 0 0 3   In a car, while stopped for a few minutes in traffic 1 0 3 0 0 0 3   Total score 6 4 7 2 0 2 21   Neck circumference - - 19.5 19.5 19.5 18.25 18.5       Past Medical History:  Past Medical History:   Diagnosis Date    Asthma     Diabetes mellitus ([address]     Hyperlipidemia     Hypertension     Lateral epicondylitis of left elbow 8/22/2019    Microalbuminuria     LIANE on CPAP     LIANE inhaler, Inhale 2 puffs into the lungs every 6 hours as needed for Wheezing, Disp: 18 g, Rfl: 11    Insulin Pen Needle (EASY TOUCH PEN NEEDLES) 31G X 5 MM MISC, USE FOR INJECTION DAILY, Disp: 100 each, Rfl: 10    TRUEPLUS LANCETS 28G MISC, As indicated. , Disp: 100 each, Rfl: 10    Lancets MISC, Test once daily for diabetes e11.9, Disp: 100 each, Rfl: 3    budesonide-formoterol (SYMBICORT) 80-4.5 MCG/ACT AERO, Inhale 2 puffs into the lungs daily, Disp: 30.6 g, Rfl: 10    BASAGLAR KWIKPEN 100 UNIT/ML injection pen, INJECT 50 UNITS SUBCUTANEOUSLY NIGHTLY, Disp: 15 mL, Rfl: 11    Blood Glucose Monitoring Suppl (TRUE METRIX METER) w/Device KIT, USE DAILY TO CHECK BLOOD SUGAR, Disp: 1 kit, Rfl: 0    Blood Glucose Monitoring Suppl (TRUE METRIX METER) w/Device KIT, USE DAILY TO CHECK BLOOD SUGAR, Disp: 1 kit, Rfl: 0    ONE TOUCH LANCETS MISC, 1 each by Does not apply route daily, Disp: 100 each, Rfl: 3    Respiratory Therapy Supplies (NEBULIZER COMPRESSOR) KIT, 1 kit by Does not apply route daily as needed (use as needed) Use with inhalation solution, DX: J45.41, Disp: 1 kit, Rfl: 0      Review of Systems    Objective:   PHYSICAL EXAM:    There were no vitals taken for this visit. Physical Exam        DATA:   10/25/17 split-night sleep study AHI 86.3, low SPO2 56%, improved sleep-related breathing with BiPAP, recommendations BiPAP 21/16 cm H2O    BIPAP compliance data:  Compliance download report from 1/14/18 to 2/12/18  showed patient is using machine 6:32 hrs/night with 93% compliance and AHI 0.5 within this time frame. 28/30days with greater than 4 hours of machine use. BIPAP 21/16 cm H20    Compliance download report from 1/14/19 to 2/12/19 showed patient is using machine 8:33 hrs/night with 100% compliance and AHI 0.2 within this time frame. 30/30days with greater than 4 hours of machine use.   BIPAP 20/15 cm H20    Compliance download report from 1/20/20 to 2/18/20 showed patient is using machine 8:05 hrs/night with 100% compliance and AHI 0.1 within this time frame. 30/30days with greater than 4 hours of machine use. BIPAP 20/15 cm H20    Compliance download report from 8/26/20 to 9/24/20 reviewed today by me and showed patient is using machine 8:39 hrs/night with 100% compliance and AHI 0.2 within this time frame. 30/30days with greater than 4 hours of machine use. BIPAP 20/15 cm H20       Assessment:       · Severe LIANE. BiPAP 20/15 cm H2O. Optimal compliance and efficacy on review today. · Snoring- resolved on BIPAP  · Witnessed apnea -resolved on BIPAP  · Hypersomnia-resolved with BiPAP use, some return over past 2 weeks  · Asthma, DM, HTN- followed by PCP  · Obesity        Plan:       · Continue BIPAP 20/15 cm H2O  · We will send order for supplies to DME. Patient to call office if cannot get new mask or if getting new mask does not improve symptoms. Consider pressure increase if no improvement. · Discussed severity of sleep apnea and importance of BiPAP use  · Advised to use BiPAP 6-8 hrs at night and during naps. · Replacement of mask, tubing, head straps every 3-6 months or sooner if damaged. · Patient instructed to contact DME company for any mask, tubing or machine trouble shooting if problems arise. · Sleep hygiene  · Avoid sedatives, alcohol and caffeinated drinks at bed time. · No driving motorized vehicles or operating heavy machinery while fatigue, drowsy or sleepy. - patient verbalized understanding and agrees. · Weight loss is also recommended as a long-term intervention. · Complications of LIANE if not treated were discussed with patient patient to include systemic hypertension, pulmonary hypertension, cardiovascular morbidities, car accidents and all cause mortality.   · Patient education  regarding sleep tips and PAP cleaning recommendations       Follow up: as scheduled, sooner if needed    Consent for telehealth visit was obtained and is noted in chart    Kosta Simone Martins is a 54 y.o. male evaluated via telephone on 9/25/2020. I affirm this is a Patient Initiated Episode with a Patient who has not had a related appointment within my department in the past 7 days or scheduled within the next 24 hours.     Patient identification was verified at the start of the visit: Yes    Total Time: 8 minutes    Note: not billable if this call serves to triage the patient into an appointment for the relevant concern      Blank Jim

## 2020-09-25 NOTE — PATIENT INSTRUCTIONS
your ability to fall asleep. Regular exercise is recommended to help you deepen the sleep. 3- Avoid heavy, spicy, or sugary foods 4-6 hours before bedtime: These can affect your ability to stay asleep. 4- Have a light snack before bed: Having an empty stomach can interfere with your sleep. Dairy products and turkey contain tryptophan, which acts as a natural sleep inducer. 5- Stay away from caffeine, nicotine and alcohol at least 4-6 hours before bed: Caffeine and nicotine are stimulants that interfere with your ability to fall asleep. While alcohol has an immediate sleep-inducing effect, a few hours later, as alcohol levels in your blood start to fall, there is a stimulant effect and you will experience fragmented sleep. 6- Take a hot bath 90 minutes before bedtime:  A hot bath will raise your body temperature, but it is the drop in body temperature that may leave you feeling sleepy  7- Develop sleep rituals: it is important to give your body cues that it is time to slow down and sleep. Listen to relaxing music, read something soothing for 15 minutes, have a cup of caffeine free tea, or do relaxation exercises such as yoga or deep breathing help relieve anxiety and reduce muscle tension. 8- Fix a bedtime and an awakening time: Even on weekends! When your sleep cycle has a regular rhythm, you will feel better. 9- Sleep only when sleepy: This reduces the time you are awake in bed. 10- Get into your favorite sleeping position: If you can't fall asleep within 15-30 minutes, get up and do something boring until you feel sleepy. Sit quietly in the dark or read the warranty on your refrigerator. Don't expose yourself to bright light while you are up, it gives cues to your brain that it is time to wake up. 11- Only use your bed for sleeping: Dont use the bed as an office, workroom or recreation room. Let your body \"know\" that the bed is associated with sleeping  12- Use comfortable bedding.  Uncomfortable bedding can prevent good sleep. Evaluate whether or not this is a source of your problem, and make appropriate changes. 13- Make sure your bed and bedroom are quiet and comfortable: A hot room can be uncomfortable. A cooler room, along with enough blankets to stay warm is recommended. Get a blackout shade or wear a slumber mask and wear earplugs or get a \"white noise\" machine for light and noise distractions. 14- Use sunlight to set your biological clock: When you get up in the morning, go outside and turn your face to the sun for 15 minutes. 13- Dont take your worries to bed: Leave worries about job, school, daily life, etc., behind when you go to bed. Some people find it useful to assign a \"worry period\" during the evening or afternoon for these issues. CPAP Equipment Cleaning and Disinfecting Schedule  Equipment Cleaning Frequency Instructions  Disinfecting Frequency   Non-Disposable Filters  Weekly Mild soapy water, Rinse, Air Dry Not Required   Disposable Filters Change as needed  2-4 weeks Do Not Wash Not Required   Hose/tubing Daily Mild soapy water, Rinse, Air Dry Once a week   Mask / Nasal Pillows Daily Mild soapy water, Rinse, Air Dry Once a week   Headgear Weekly Hand wash, Mild soapy water, Rinse, Dry  Not Required   Humidifier Daily Empty water daily  Mild soapy water, Rinse well, Air Dry  Once a week   CPAP Unit As Needed Dust with damp cloth,  No detergents or sprays Not Required         Disinfect (per schedule) with 1 part white vinegar and 3 parts water- soak mask and water chamber for 30 minutes every 1-2 weeks, more often if sick. Allow water/vinegar mixture to run through tubing. Allow all equipment to air dry. Drying Hints:   Always hang tubing away from direct sunlight, as this will cause the tubing to become yellow, brittle and crack over a period of time. DO NOT attach the wet tubing to your CPAP unit to blow-dry it. The moisture from the tubing can drain back into your machine. Moisture in your unit can cause sudden pressure increases or short circuits  DO's and DON'Ts:  - Don't use alcohol-based products to clean your mask, because it can cause the materials to become hard and brittle. - Don't put headgear in the washer or dryer  - Don't use any caustic or household cleaning solutions such as bleach on your CPAP   equipment.  - Do follow the recommended cleaning schedule. - Do change your disposable filter frequently. Adapted From: [employer]!/cleaning. shtm.   These are general suggestions for all models please follow specific s recommendations and specific instructions

## 2020-10-12 RX ORDER — LANCETS 30 GAUGE
EACH MISCELLANEOUS
Qty: 100 EACH | Refills: 10 | Status: SHIPPED | OUTPATIENT
Start: 2020-10-12 | End: 2021-08-27

## 2020-10-13 ENCOUNTER — OFFICE VISIT (OUTPATIENT)
Dept: ORTHOPEDIC SURGERY | Age: 55
End: 2020-10-13
Payer: COMMERCIAL

## 2020-10-13 VITALS — BODY MASS INDEX: 40.48 KG/M2 | WEIGHT: 220 LBS | HEIGHT: 62 IN

## 2020-10-13 PROCEDURE — G8427 DOCREV CUR MEDS BY ELIG CLIN: HCPCS | Performed by: PODIATRIST

## 2020-10-13 PROCEDURE — L3040 FT ARCH SUPRT PREMOLD LONGIT: HCPCS | Performed by: PODIATRIST

## 2020-10-13 PROCEDURE — 99213 OFFICE O/P EST LOW 20 MIN: CPT | Performed by: PODIATRIST

## 2020-10-13 PROCEDURE — G8417 CALC BMI ABV UP PARAM F/U: HCPCS | Performed by: PODIATRIST

## 2020-10-13 PROCEDURE — G8484 FLU IMMUNIZE NO ADMIN: HCPCS | Performed by: PODIATRIST

## 2020-10-13 PROCEDURE — 4004F PT TOBACCO SCREEN RCVD TLK: CPT | Performed by: PODIATRIST

## 2020-10-13 PROCEDURE — 3017F COLORECTAL CA SCREEN DOC REV: CPT | Performed by: PODIATRIST

## 2020-10-13 NOTE — PROGRESS NOTES
written instructions for the use of and application of this item were provided. They were instructed to contact the office immediately should the brace result in increased pain, decreased sensation, increased swelling or worsening of the condition.

## 2020-10-26 RX ORDER — INSULIN GLARGINE 100 [IU]/ML
INJECTION, SOLUTION SUBCUTANEOUS
Qty: 15 ML | Refills: 11 | Status: SHIPPED | OUTPATIENT
Start: 2020-10-26

## 2020-11-16 RX ORDER — PEN NEEDLE, DIABETIC 31 GX3/16"
NEEDLE, DISPOSABLE MISCELLANEOUS
Qty: 100 EACH | Refills: 10 | Status: SHIPPED | OUTPATIENT
Start: 2020-11-16 | End: 2021-10-20

## 2020-11-16 RX ORDER — DICLOFENAC SODIUM 75 MG/1
TABLET, DELAYED RELEASE ORAL
Qty: 30 TABLET | Refills: 2 | Status: SHIPPED | OUTPATIENT
Start: 2020-11-16 | End: 2021-02-18

## 2020-11-16 RX ORDER — DILTIAZEM HYDROCHLORIDE 60 MG/1
TABLET, FILM COATED ORAL
Qty: 10.2 G | Refills: 10 | Status: SHIPPED | OUTPATIENT
Start: 2020-11-16 | End: 2021-10-20

## 2020-11-17 ENCOUNTER — TELEPHONE (OUTPATIENT)
Dept: ADMINISTRATIVE | Age: 55
End: 2020-11-17

## 2021-01-20 RX ORDER — FLUTICASONE PROPIONATE 50 MCG
SPRAY, SUSPENSION (ML) NASAL
Qty: 16 G | Refills: 10 | Status: SHIPPED | OUTPATIENT
Start: 2021-01-20 | End: 2021-12-17

## 2021-01-25 ENCOUNTER — TELEPHONE (OUTPATIENT)
Dept: FAMILY MEDICINE CLINIC | Age: 56
End: 2021-01-25

## 2021-01-25 DIAGNOSIS — G47.33 SEVERE OBSTRUCTIVE SLEEP APNEA: ICD-10-CM

## 2021-01-25 DIAGNOSIS — I10 ESSENTIAL HYPERTENSION: ICD-10-CM

## 2021-01-25 DIAGNOSIS — E78.00 PURE HYPERCHOLESTEROLEMIA: ICD-10-CM

## 2021-01-25 DIAGNOSIS — F33.0 MILD EPISODE OF RECURRENT MAJOR DEPRESSIVE DISORDER (HCC): ICD-10-CM

## 2021-01-25 NOTE — TELEPHONE ENCOUNTER
Patient has an appointment scheduled for 1/29/21 and will be getting the bloodwork done at [address]. Orders please.

## 2021-02-17 ENCOUNTER — HOSPITAL ENCOUNTER (OUTPATIENT)
Age: 56
Discharge: HOME | End: 2021-02-17
Payer: COMMERCIAL

## 2021-02-17 DIAGNOSIS — G47.33 SEVERE OBSTRUCTIVE SLEEP APNEA: ICD-10-CM

## 2021-02-17 DIAGNOSIS — F33.0 MILD EPISODE OF RECURRENT MAJOR DEPRESSIVE DISORDER (HCC): ICD-10-CM

## 2021-02-17 DIAGNOSIS — I10 ESSENTIAL HYPERTENSION: ICD-10-CM

## 2021-02-17 DIAGNOSIS — E78.00 PURE HYPERCHOLESTEROLEMIA: ICD-10-CM

## 2021-02-17 LAB
ALBUMIN SERPL-MCNC: 4.3 G/DL (ref 3.4–5)
ALP BLD-CCNC: 58 U/L (ref 40–129)
ALT SERPL-CCNC: 28 U/L (ref 10–40)
ANION GAP SERPL CALCULATED.3IONS-SCNC: 11 MMOL/L (ref 3–16)
AST SERPL-CCNC: 23 U/L (ref 15–37)
BASOPHILS ABSOLUTE: 0.1 K/UL (ref 0–0.2)
BASOPHILS RELATIVE PERCENT: 0.7 %
BILIRUB SERPL-MCNC: 0.6 MG/DL (ref 0–1)
BILIRUBIN DIRECT: <0.2 MG/DL (ref 0–0.3)
BILIRUBIN, INDIRECT: NORMAL MG/DL (ref 0–1)
BUN BLDV-MCNC: 18 MG/DL (ref 7–20)
CALCIUM SERPL-MCNC: 9.7 MG/DL (ref 8.3–10.6)
CHLORIDE BLD-SCNC: 100 MMOL/L (ref 99–110)
CHOLESTEROL, TOTAL: 175 MG/DL (ref 0–199)
CO2: 26 MMOL/L (ref 21–32)
CREAT SERPL-MCNC: 0.9 MG/DL (ref 0.9–1.3)
EOSINOPHILS ABSOLUTE: 0.2 K/UL (ref 0–0.6)
EOSINOPHILS RELATIVE PERCENT: 2.1 %
GFR AFRICAN AMERICAN: >60
GFR NON-AFRICAN AMERICAN: >60
GLUCOSE BLD-MCNC: 118 MG/DL (ref 70–99)
HCT VFR BLD CALC: 43.9 % (ref 40.5–52.5)
HDLC SERPL-MCNC: 39 MG/DL (ref 40–60)
HEMOGLOBIN: 14.8 G/DL (ref 13.5–17.5)
LDL CHOLESTEROL CALCULATED: 84 MG/DL
LYMPHOCYTES ABSOLUTE: 1.9 K/UL (ref 1–5.1)
LYMPHOCYTES RELATIVE PERCENT: 22.4 %
MCH RBC QN AUTO: 30.9 PG (ref 26–34)
MCHC RBC AUTO-ENTMCNC: 33.6 G/DL (ref 31–36)
MCV RBC AUTO: 91.9 FL (ref 80–100)
MONOCYTES ABSOLUTE: 0.5 K/UL (ref 0–1.3)
MONOCYTES RELATIVE PERCENT: 6.1 %
NEUTROPHILS ABSOLUTE: 5.9 K/UL (ref 1.7–7.7)
NEUTROPHILS RELATIVE PERCENT: 68.7 %
PDW BLD-RTO: 12.2 % (ref 12.4–15.4)
PLATELET # BLD: 167 K/UL (ref 135–450)
PMV BLD AUTO: 10.7 FL (ref 5–10.5)
POTASSIUM SERPL-SCNC: 4.1 MMOL/L (ref 3.5–5.1)
RBC # BLD: 4.78 M/UL (ref 4.2–5.9)
SODIUM BLD-SCNC: 137 MMOL/L (ref 136–145)
TOTAL PROTEIN: 7.2 G/DL (ref 6.4–8.2)
TRIGL SERPL-MCNC: 259 MG/DL (ref 0–150)
VLDLC SERPL CALC-MCNC: 52 MG/DL
WBC # BLD: 8.6 K/UL (ref 4–11)

## 2021-02-17 PROCEDURE — 83036 HEMOGLOBIN GLYCOSYLATED A1C: CPT

## 2021-02-17 PROCEDURE — 36415 COLL VENOUS BLD VENIPUNCTURE: CPT

## 2021-02-17 PROCEDURE — 80076 HEPATIC FUNCTION PANEL: CPT

## 2021-02-17 PROCEDURE — 80061 LIPID PANEL: CPT

## 2021-02-17 PROCEDURE — 80048 BASIC METABOLIC PNL TOTAL CA: CPT

## 2021-02-17 PROCEDURE — 85025 COMPLETE CBC W/AUTO DIFF WBC: CPT

## 2021-02-18 LAB
ESTIMATED AVERAGE GLUCOSE: 157.1 MG/DL
HBA1C MFR BLD: 7.1 %

## 2021-02-18 RX ORDER — FENOFIBRATE 160 MG/1
TABLET ORAL
Qty: 30 TABLET | Refills: 2 | Status: SHIPPED | OUTPATIENT
Start: 2021-02-18 | End: 2021-05-19

## 2021-02-18 RX ORDER — METOPROLOL TARTRATE 50 MG/1
TABLET, FILM COATED ORAL
Qty: 60 TABLET | Refills: 2 | Status: SHIPPED | OUTPATIENT
Start: 2021-02-18 | End: 2021-05-19

## 2021-02-18 RX ORDER — DICLOFENAC SODIUM 75 MG/1
TABLET, DELAYED RELEASE ORAL
Qty: 30 TABLET | Refills: 2 | OUTPATIENT
Start: 2021-02-18 | End: 2021-04-03

## 2021-02-18 RX ORDER — PIOGLITAZONEHYDROCHLORIDE 30 MG/1
TABLET ORAL
Qty: 30 TABLET | Refills: 2 | Status: SHIPPED | OUTPATIENT
Start: 2021-02-18 | End: 2021-05-19

## 2021-02-18 NOTE — TELEPHONE ENCOUNTER
Refill Request     Last Seen: 11/30/2020    Last Written: 5/29/20 metoprolol 180 tablets with 2 refill  5/29/20 pioglitazone 90 tablets with 2 refill  11/16/20 diclofenac 30 tablets with 2 refill  5/29/20 fenofibrate 90 tablets with 2 refill    Next Appointment:   Future Appointments   Date Time Provider Lianna Lubna   2/19/2021  8:00 AM MD Shelia Alvarez [facility]   2/23/2021  1:40 PM NICK Doshi - CNP [facility]             Requested Prescriptions     Pending Prescriptions Disp Refills    metoprolol tartrate (LOPRESSOR) 50 MG tablet [Pharmacy Med Name: METOPROLOL TART 50MG TAB 50 Tablet] 60 tablet 2     Sig: TAKE 1 TABLET BY MOUTH TWICE DAILY    pioglitazone (ACTOS) 30 MG tablet [Pharmacy Med Name: PIOGLITAZONE 30 MG TABS 30 Tablet] 30 tablet 2     Sig: TAKE 1 TABLET BY MOUTH DAILY    diclofenac (VOLTAREN) 75 MG EC tablet [Pharmacy Med Name: DICLOFENAC SOD DR 75MG TAB 75 Tablet] 30 tablet 2     Sig: TAKE ONE (1) TABLET BY MOUTH TWICE DAILY WITH FOOD    fenofibrate (TRIGLIDE) 160 MG tablet [Pharmacy Med Name: FENOFIBRATE 160 MG  Tablet] 30 tablet 2     Sig: TAKE 1 TABLET BY MOUTH DAILY

## 2021-02-19 ENCOUNTER — OFFICE VISIT (OUTPATIENT)
Dept: FAMILY MEDICINE CLINIC | Age: 56
End: 2021-02-19
Payer: COMMERCIAL

## 2021-02-19 VITALS
BODY MASS INDEX: 44.75 KG/M2 | HEART RATE: 78 BPM | DIASTOLIC BLOOD PRESSURE: 68 MMHG | WEIGHT: 243.2 LBS | RESPIRATION RATE: 18 BRPM | SYSTOLIC BLOOD PRESSURE: 108 MMHG | TEMPERATURE: 97.2 F | OXYGEN SATURATION: 98 % | HEIGHT: 62 IN

## 2021-02-19 DIAGNOSIS — E78.00 PURE HYPERCHOLESTEROLEMIA: ICD-10-CM

## 2021-02-19 DIAGNOSIS — G47.33 SEVERE OBSTRUCTIVE SLEEP APNEA: ICD-10-CM

## 2021-02-19 DIAGNOSIS — J45.41 MODERATE PERSISTENT ASTHMA WITH ACUTE EXACERBATION: ICD-10-CM

## 2021-02-19 DIAGNOSIS — F17.210 CIGARETTE SMOKER: ICD-10-CM

## 2021-02-19 DIAGNOSIS — I10 ESSENTIAL HYPERTENSION: ICD-10-CM

## 2021-02-19 PROCEDURE — 3051F HG A1C>EQUAL 7.0%<8.0%: CPT | Performed by: INTERNAL MEDICINE

## 2021-02-19 PROCEDURE — 4004F PT TOBACCO SCREEN RCVD TLK: CPT | Performed by: INTERNAL MEDICINE

## 2021-02-19 PROCEDURE — 90686 IIV4 VACC NO PRSV 0.5 ML IM: CPT | Performed by: INTERNAL MEDICINE

## 2021-02-19 PROCEDURE — 2022F DILAT RTA XM EVC RTNOPTHY: CPT | Performed by: INTERNAL MEDICINE

## 2021-02-19 PROCEDURE — G8482 FLU IMMUNIZE ORDER/ADMIN: HCPCS | Performed by: INTERNAL MEDICINE

## 2021-02-19 PROCEDURE — G8428 CUR MEDS NOT DOCUMENT: HCPCS | Performed by: INTERNAL MEDICINE

## 2021-02-19 PROCEDURE — 99214 OFFICE O/P EST MOD 30 MIN: CPT | Performed by: INTERNAL MEDICINE

## 2021-02-19 PROCEDURE — G8417 CALC BMI ABV UP PARAM F/U: HCPCS | Performed by: INTERNAL MEDICINE

## 2021-02-19 PROCEDURE — 3017F COLORECTAL CA SCREEN DOC REV: CPT | Performed by: INTERNAL MEDICINE

## 2021-02-19 RX ORDER — ATORVASTATIN CALCIUM 20 MG/1
20 TABLET, FILM COATED ORAL DAILY
Qty: 90 TABLET | Refills: 3 | Status: SHIPPED | OUTPATIENT
Start: 2021-02-19 | End: 2021-03-26 | Stop reason: SDUPTHER

## 2021-02-19 ASSESSMENT — ENCOUNTER SYMPTOMS
BACK PAIN: 0
ALLERGIC/IMMUNOLOGIC NEGATIVE: 1
RESPIRATORY NEGATIVE: 1
GASTROINTESTINAL NEGATIVE: 1
EYES NEGATIVE: 1

## 2021-02-19 NOTE — ASSESSMENT & PLAN NOTE
Still smoking down from 1/2 pk to 2-4 cig/d. Trying to quit. Had severe URI for ~1 mo. Has been on Prednisone and antibiotic from ER and Urgernt care last yr. Still cough, wheezing, Rhinitis.

## 2021-02-19 NOTE — PATIENT INSTRUCTIONS
Severe Obstructive Sleep Apnea. Continue to use Bi-pap. Call if new c/o. To Sleep clinic as scheduled. Class 3 Severe Obesity in Adult (Hcc). Discussed options. Must improve diet and lose weight. Goal w/ DM is to get to<200 lbs. Pure Hypercholesterolemia. Will begin Atorvastatin 20 mg daily. Will rechx L/L w/ next visit in 3 months. Cigarette Smoker. MUST STOP!!! Down to 2-4 cig/d. Use gum. Moderate Persistent Asthma With Acute Exacerbation. Continue Symbicort and spiriva. Must stop smoking. DM Type 2, Uncontrolled, With Neuropathy ([facility]). Continue present meds but must improve diet and lose weight. Essential Hypertension. Continue present meds. Home BP checks. Call if>140/90. Improve diet. Avoid caffeine and salt. Call if new c/o w/ meds.

## 2021-02-19 NOTE — ASSESSMENT & PLAN NOTE
Sugars are , diet is fair, weight is stable up 9 lbs since last visit, no change in previously reported moderate neuropathy of feet, no change in vision, no claudication, no foot ulcers, no new skin lesions. No change in medications(Basiglar and Actos). No c/o with meds. Last eye exam 8/2020? was good.

## 2021-02-23 ENCOUNTER — TELEPHONE (OUTPATIENT)
Dept: PULMONOLOGY | Age: 56
End: 2021-02-23

## 2021-02-23 ENCOUNTER — VIRTUAL VISIT (OUTPATIENT)
Dept: PULMONOLOGY | Age: 56
End: 2021-02-23
Payer: COMMERCIAL

## 2021-02-23 DIAGNOSIS — G47.33 SEVERE OBSTRUCTIVE SLEEP APNEA: Primary | ICD-10-CM

## 2021-02-23 DIAGNOSIS — Z71.89 ENCOUNTER FOR BIPAP USE COUNSELING: ICD-10-CM

## 2021-02-23 DIAGNOSIS — E66.01 OBESITY, MORBID, BMI 40.0-49.9 (HCC): ICD-10-CM

## 2021-02-23 PROCEDURE — 99442 PR PHYS/QHP TELEPHONE EVALUATION 11-20 MIN: CPT | Performed by: NURSE PRACTITIONER

## 2021-02-23 ASSESSMENT — SLEEP AND FATIGUE QUESTIONNAIRES
HOW LIKELY ARE YOU TO NOD OFF OR FALL ASLEEP WHILE SITTING AND TALKING TO SOMEONE: 0
HOW LIKELY ARE YOU TO NOD OFF OR FALL ASLEEP WHILE LYING DOWN TO REST IN THE AFTERNOON WHEN CIRCUMSTANCES PERMIT: 1
HOW LIKELY ARE YOU TO NOD OFF OR FALL ASLEEP WHILE WATCHING TV: 1
HOW LIKELY ARE YOU TO NOD OFF OR FALL ASLEEP IN A CAR, WHILE STOPPED FOR A FEW MINUTES IN TRAFFIC: 0
HOW LIKELY ARE YOU TO NOD OFF OR FALL ASLEEP WHILE SITTING QUIETLY AFTER LUNCH WITHOUT ALCOHOL: 0
HOW LIKELY ARE YOU TO NOD OFF OR FALL ASLEEP WHILE SITTING AND READING: 0

## 2021-02-23 NOTE — PROGRESS NOTES
Patient ID: Bryan Vaca is a 64 y.o. male who is being seen today for   Chief Complaint   Patient presents with    Sleep Apnea     1 year sleep      Self referral    HPI:   Bryan Vaca is a 64 y.o. male for telephone only virtual visit for LIANE follow up. States he is doing okay with BIPAP. Patient is using BIPAP  8-10 hrs/night. Using humidifier. No snoring on BIPAP. The pressure is well tolerated. The mask is comfortable- full face. Some mask leak. No significant daytime sleepiness, improved with new supplies. No nodding off when driving. No dry nose or throat. No fatigue. Bedtime is 8-9 pm and rise time is 430 am. Sleep onset is few minutes. Wakes up 3-4 times at night total. 2-3 nocturia. It takes few minutes to fall back a sleep. Occasional naps during the day, for 1-2 hours. No headache in am. No weight gain. 1-2 caffienated beverages during the day. No alcohol. ESS is 3.       Sleep Medicine 2/23/2021 9/25/2020 2/19/2020 2/19/2020 2/13/2019 2/13/2018 12/7/2017   Sitting and reading 0 2 0 0 0 0 0   Watching TV 1 1 0 0 0 0 2   Sitting, inactive in a public place (e.g. a theatre or a meeting) 0 0 0 0 0 0 0   As a passenger in a car for an hour without a break 1 0 0 0 0 0 0   Lying down to rest in the afternoon when circumstances permit 1 2 3 3 2 0 0   Sitting and talking to someone 0 0 0 0 0 0 0   Sitting quietly after a lunch without alcohol 0 0 1 1 0 0 0   In a car, while stopped for a few minutes in traffic 0 1 0 3 0 0 0   Total score 3 6 4 7 2 0 2   Neck circumference - - - 19.5 19.5 19.5 18.25       Past Medical History:  Past Medical History:   Diagnosis Date    Asthma     Diabetes mellitus ([address]     Hyperlipidemia     Hypertension     Lateral epicondylitis of left elbow 8/22/2019    Microalbuminuria     LIANE on CPAP     LIAEN treated with BiPAP     LIANE treated with BiPAP     SOB (shortness of breath)     Umbilical hernia        Past Surgical History: Procedure Laterality Date    COLONOSCOPY      polyp, hemorroids    HERNIA REPAIR  55/99/71    open umbilical hernia repair with mesh    SPLENECTOMY, PARTIAL      repair of \"busted spleen\"    UMBILICAL HERNIA REPAIR         Allergies:  is allergic to ibuprofen and metformin and related. Social History:    TOBACCO:   reports that he quit smoking about 4 weeks ago. His smoking use included cigarettes. He has a 15.00 pack-year smoking history. He has never used smokeless tobacco.  ETOH:   reports no history of alcohol use.     Family History:       Problem Relation Age of Onset    Diabetes Mother     Diabetes Father        Current Medications:    Current Outpatient Medications:     atorvastatin (LIPITOR) 20 MG tablet, Take 1 tablet by mouth daily, Disp: 90 tablet, Rfl: 3    metoprolol tartrate (LOPRESSOR) 50 MG tablet, TAKE 1 TABLET BY MOUTH TWICE DAILY, Disp: 60 tablet, Rfl: 2    diclofenac (VOLTAREN) 75 MG EC tablet, TAKE ONE (1) TABLET BY MOUTH TWICE DAILY WITH FOOD, Disp: 30 tablet, Rfl: 2    fenofibrate (TRIGLIDE) 160 MG tablet, TAKE 1 TABLET BY MOUTH DAILY, Disp: 30 tablet, Rfl: 2    fluticasone (FLONASE) 50 MCG/ACT nasal spray, INSTILL ONE (1) SPRAY IN EACH NOSTRIL DAILY, Disp: 16 g, Rfl: 10    Insulin Pen Needle (UNIFINE PENTIPS PLUS) 31G X 5 MM MISC, USE FOR INJECTION DAILY, Disp: 100 each, Rfl: 10    SYMBICORT 80-4.5 MCG/ACT AERO, INHALE TWO (2) PUFFS BY MOUTH TWICE DAILY, Disp: 10.2 g, Rfl: 10    BASAGLAR KWIKPEN 100 UNIT/ML injection pen, INJECT 50 UNITS SUBCUTANEOUSLY NIGHTLY, Disp: 15 mL, Rfl: 11    Easy Touch Lancets 30G/Twist MISC, USE TO TEST BLOOD SUGAR ONCE DAILY, Disp: 100 each, Rfl: 10    omega-3 acid ethyl esters (LOVAZA) 1 g capsule, Take 2 capsules by mouth 2 times daily, Disp: 360 capsule, Rfl: 3    gabapentin (NEURONTIN) 400 MG capsule, Take 1 in am 1 at midday and 2 at bedtime, Disp: 360 capsule, Rfl: 2   blood glucose test strips (TRUE METRIX BLOOD GLUCOSE TEST) strip, USE TO TEST BLOOD SUGAR ONCE DAILY, Disp: 100 each, Rfl: 10    lisinopril (PRINIVIL;ZESTRIL) 5 MG tablet, TAKE (1) TABLET BY MOUTH DAILY, Disp: 90 tablet, Rfl: 11    SPIRIVA RESPIMAT 2.5 MCG/ACT AERS inhaler, INHALE TWO (2) PUFFS BY MOUTH INTO THE LUNGS DAILY, Disp: 4 g, Rfl: 10    albuterol sulfate  (90 Base) MCG/ACT inhaler, Inhale 2 puffs into the lungs every 6 hours as needed for Wheezing, Disp: 18 g, Rfl: 11    TRUEPLUS LANCETS 28G MISC, As indicated. , Disp: 100 each, Rfl: 10    Lancets MISC, Test once daily for diabetes e11.9, Disp: 100 each, Rfl: 3    Blood Glucose Monitoring Suppl (TRUE METRIX METER) w/Device KIT, USE DAILY TO CHECK BLOOD SUGAR, Disp: 1 kit, Rfl: 0    Blood Glucose Monitoring Suppl (TRUE METRIX METER) w/Device KIT, USE DAILY TO CHECK BLOOD SUGAR, Disp: 1 kit, Rfl: 0    ONE TOUCH LANCETS MISC, 1 each by Does not apply route daily, Disp: 100 each, Rfl: 3    Respiratory Therapy Supplies (NEBULIZER COMPRESSOR) KIT, 1 kit by Does not apply route daily as needed (use as needed) Use with inhalation solution, DX: J45.41, Disp: 1 kit, Rfl: 0    pioglitazone (ACTOS) 30 MG tablet, TAKE 1 TABLET BY MOUTH DAILY, Disp: 30 tablet, Rfl: 2      Review of Systems    Objective:   PHYSICAL EXAM:    There were no vitals taken for this visit. Physical Exam        DATA:   10/25/17 split-night sleep study AHI 86.3, low SPO2 56%, improved sleep-related breathing with BiPAP, recommendations BiPAP 21/16 cm H2O    BIPAP compliance data:  Compliance download report from 1/14/18 to 2/12/18  showed patient is using machine 6:32 hrs/night with 93% compliance and AHI 0.5 within this time frame. 28/30days with greater than 4 hours of machine use.   BIPAP 21/16 cm H20 Compliance download report from 1/14/19 to 2/12/19 showed patient is using machine 8:33 hrs/night with 100% compliance and AHI 0.2 within this time frame. 30/30days with greater than 4 hours of machine use. BIPAP 20/15 cm H20    Compliance download report from 1/20/20 to 2/18/20 showed patient is using machine 8:05 hrs/night with 100% compliance and AHI 0.1 within this time frame. 30/30days with greater than 4 hours of machine use. BIPAP 20/15 cm H20    Compliance download report from 8/26/20 to 9/24/20 reviewed today by me and showed patient is using machine 8:39 hrs/night with 100% compliance and AHI 0.2 within this time frame. 30/30days with greater than 4 hours of machine use. BIPAP 20/15 cm H20      Compliance download report from 1/23/21 to 2/21/21 reviewed today by me and showed patient is using machine 8:40 hrs/night with 100% compliance and AHI 0.2 within this time frame. 30/30days with greater than 4 hours of machine use. BIPAP 20/15 cm H20     Assessment:       · Severe LIANE. BiPAP 20/15 cm H2O. Optimal compliance and efficacy on review today. · Snoring- resolved on BIPAP  · Witnessed apnea -resolved on BIPAP  · Hypersomnia-resolved with BiPAP use. · Asthma, DM, HTN- followed by PCP  · Obesity        Plan:       · Continue BIPAP 20/15 cm H2O  · We will send order for supplies to DME. · Could try mask liners again for leak if needed  · Discussed severity of sleep apnea and importance of BiPAP use  · Advised to use BiPAP 6-8 hrs at night and during naps. · Replacement of mask, tubing, head straps every 3-6 months or sooner if damaged. · Patient instructed to contact [employer] company for any mask, tubing or machine trouble shooting if problems arise. · Sleep hygiene  · Avoid sedatives, alcohol and caffeinated drinks at bed time. · No driving motorized vehicles or operating heavy machinery while fatigue, drowsy or sleepy. - patient verbalized understanding and agrees. · Weight loss is also recommended as a long-term intervention. · Complications of LIANE if not treated were discussed with patient patient to include systemic hypertension, pulmonary hypertension, cardiovascular morbidities, car accidents and all cause mortality. · Patient education  regarding sleep tips and PAP cleaning recommendations       Follow up: as scheduled, sooner if needed    Consent for telehealth visit was obtained and is noted in chart    Liusana Setting is a 64 y.o. male evaluated via telephone on 2/23/2021. Consent:  He and/or health care decision maker is aware that that he may receive a bill for this telephone service, depending on his insurance coverage, and has provided verbal consent to proceed: NA - Consent obtained within past 12 months    I affirm this is a Patient Initiated Episode with a Patient who has not had a related appointment within my department in the past 7 days or scheduled within the next 24 hours.     Patient identification was verified at the start of the visit: Yes    Total Time: minutes: 11-20 minutes    Note: not billable if this call serves to triage the patient into an appointment for the relevant concern      [address] [place] [address]

## 2021-03-26 RX ORDER — ALBUTEROL SULFATE 90 UG/1
2 AEROSOL, METERED RESPIRATORY (INHALATION) EVERY 6 HOURS PRN
Qty: 18 G | Refills: 11 | Status: SHIPPED | OUTPATIENT
Start: 2021-03-26 | End: 2022-03-22

## 2021-03-26 RX ORDER — ATORVASTATIN CALCIUM 20 MG/1
20 TABLET, FILM COATED ORAL DAILY
Qty: 90 TABLET | Refills: 3 | Status: SHIPPED | OUTPATIENT
Start: 2021-03-26 | End: 2021-05-26

## 2021-03-26 NOTE — TELEPHONE ENCOUNTER
Margot Bojorquez [phone] (home)    is requesting refill(s) of medication albuterol sulfate  (90 Base) MCG/ACT inhaler to preferred pharmacy [facility], [address] 2/19/21 (pertaining to medication)   Last refill 3/24/20 (per medication requested)  Next office visit scheduled or attempted Yes  Date 5/25/21  If No, reason made. Margot Bojorquez [phone] (home)    is requesting refill(s) of medication SPIRIVA RESPIMAT 2.5 MCG/ACT AERS inhaler to preferred pharmacy [facility], [address] 2/19/21 (pertaining to medication)   Last refill 5/1/20 (per medication requested)  Next office visit scheduled or attempted Yes  Date 5/25/21  If No, reason made.

## 2021-04-03 ENCOUNTER — HOSPITAL ENCOUNTER (EMERGENCY)
Age: 56
Discharge: HOME | End: 2021-04-03
Attending: EMERGENCY MEDICINE
Payer: COMMERCIAL

## 2021-04-03 VITALS
BODY MASS INDEX: 42.52 KG/M2 | TEMPERATURE: 98.2 F | DIASTOLIC BLOOD PRESSURE: 90 MMHG | WEIGHT: 240 LBS | RESPIRATION RATE: 16 BRPM | SYSTOLIC BLOOD PRESSURE: 148 MMHG | HEIGHT: 63 IN | HEART RATE: 76 BPM | OXYGEN SATURATION: 97 %

## 2021-04-03 DIAGNOSIS — R03.0 ELEVATED BLOOD PRESSURE READING: ICD-10-CM

## 2021-04-03 DIAGNOSIS — S05.02XA: ICD-10-CM

## 2021-04-03 DIAGNOSIS — H57.9 SENSATION OF FOREIGN BODY IN EYE: Primary | ICD-10-CM

## 2021-04-03 PROCEDURE — 6370000000 HC RX 637 (ALT 250 FOR IP): Performed by: EMERGENCY MEDICINE

## 2021-04-03 PROCEDURE — 99284 EMERGENCY DEPT VISIT MOD MDM: CPT

## 2021-04-03 RX ORDER — OXYCODONE HYDROCHLORIDE AND ACETAMINOPHEN 5; 325 MG/1; MG/1
1 TABLET ORAL ONCE
Status: COMPLETED | OUTPATIENT
Start: 2021-04-03 | End: 2021-04-03

## 2021-04-03 RX ORDER — OXYCODONE HYDROCHLORIDE AND ACETAMINOPHEN 5; 325 MG/1; MG/1
1 TABLET ORAL EVERY 6 HOURS PRN
Qty: 6 TABLET | Refills: 0 | Status: SHIPPED | OUTPATIENT
Start: 2021-04-03 | End: 2021-04-10

## 2021-04-03 RX ORDER — IBUPROFEN 600 MG/1
600 TABLET ORAL EVERY 8 HOURS PRN
Qty: 30 TABLET | Refills: 0 | Status: SHIPPED | OUTPATIENT
Start: 2021-04-03 | End: 2021-12-16

## 2021-04-03 RX ORDER — TETRACAINE HYDROCHLORIDE 5 MG/ML
1 SOLUTION OPHTHALMIC ONCE
Status: DISCONTINUED | OUTPATIENT
Start: 2021-04-03 | End: 2021-04-03 | Stop reason: HOSPADM

## 2021-04-03 RX ORDER — ERYTHROMYCIN 5 MG/G
OINTMENT OPHTHALMIC EVERY 6 HOURS
Qty: 1 TUBE | Refills: 0 | Status: SHIPPED | OUTPATIENT
Start: 2021-04-03 | End: 2021-04-08

## 2021-04-03 RX ORDER — ERYTHROMYCIN 5 MG/G
OINTMENT OPHTHALMIC ONCE
Status: COMPLETED | OUTPATIENT
Start: 2021-04-03 | End: 2021-04-03

## 2021-04-03 RX ADMIN — ERYTHROMYCIN: 5 OINTMENT OPHTHALMIC at 04:43

## 2021-04-03 RX ADMIN — OXYCODONE HYDROCHLORIDE AND ACETAMINOPHEN 1 TABLET: 5; 325 TABLET ORAL at 04:44

## 2021-04-03 ASSESSMENT — PAIN SCALES - GENERAL
PAINLEVEL_OUTOF10: 6
PAINLEVEL_OUTOF10: 5
PAINLEVEL_OUTOF10: 6

## 2021-04-03 ASSESSMENT — VISUAL ACUITY
OD: 20/40
OU: 1
OS: 20/100

## 2021-04-03 NOTE — ED PROVIDER NOTES
Emergency Physician Note  [address] [place] [address]  ED  [address] [address]  Dept: [phone]  Dept Fax: [phone]  Loc: [phone]  Open Note Time:  2:41 AM EDT    Chief Complaint  Eye Pain (thinks he got some car debree in his left eye)     History of Present Illness  Bettina Wilkerson is a 64 y.o. male  has a past medical history of Asthma, Diabetes mellitus ([address] Hyperlipidemia, Hypertension, Lateral epicondylitis of left elbow, Microalbuminuria, LIANE on CPAP, LIANE treated with BiPAP, LIANE treated with BiPAP, SOB (shortness of breath), and Umbilical hernia. who presents to the ED for left eye pain. Patient believes that he got some metal in his eye while working on his car. Been having increasing pain. He feels as if there is a foreign sensation underneath his left eyelid. He has not noticed any vision changes. He reports he is been rubbing at his eye trying to get out the foreign object. He is even tried to wash it out with normal tap water. He denies being exposed to ultraviolet light. Denies fever,  chest pain, shortness of breath, cough, abdominal pain, nausea, vomiting, diarrhea, headache. No palliative/provocative factors. Unless otherwise stated in this report or unable to obtain because of the patient's clinical or mental status as evidenced by the medical record, this patient's positive and negative responses for review of systems, constitutional, psych, eyes, ENT, cardiovascular, respiratory, gastrointestinal, neurological, genitourinary, musculoskeletal, integument systems and systems related to the presenting problem are either stated in the preceding paragraph or were not pertinent or were negative for the symptoms and/or complaints related to the medical problem. I have reviewed the following from the nursing documentation:      Prior to Admission medications    Medication Sig Start Date End Date Taking?  Authorizing Provider MD   Lancets MISC Test once daily for diabetes e11.9 12/16/19  Yes Shelby May MD   Blood Glucose Monitoring Suppl (TRUE METRIX METER) w/Device KIT USE DAILY TO CHECK BLOOD SUGAR 10/15/19  Yes Shelby May MD   Blood Glucose Monitoring Suppl (TRUE METRIX METER) w/Device KIT USE DAILY TO CHECK BLOOD SUGAR 3/6/19  Yes JOSSELIN Schuler MD   ONE TOUCH LANCETS MISC 1 each by Does not apply route daily 2/5/19  Yes Shelby May MD   Respiratory Therapy Supplies (NEBULIZER COMPRESSOR) KIT 1 kit by Does not apply route daily as needed (use as needed) Use with inhalation solution, DX: J45.41 12/8/16  Yes Shelby May MD       Allergies as of 04/03/2021 - Review Complete 04/03/2021   Allergen Reaction Noted    Ibuprofen Nausea Only 05/13/2011    Metformin and related Diarrhea 09/11/2014       Past Medical History:   Diagnosis Date    Asthma     Diabetes mellitus ([facility]     Hyperlipidemia     Hypertension     Lateral epicondylitis of left elbow 8/22/2019    Microalbuminuria     LIANE on CPAP     LIANE treated with BiPAP     LIANE treated with BiPAP     SOB (shortness of breath)     Umbilical hernia         Surgical History:   Past Surgical History:   Procedure Laterality Date    COLONOSCOPY      polyp, hemorroids    HERNIA REPAIR  04/59/51    open umbilical hernia repair with mesh    SPLENECTOMY, PARTIAL      repair of \"busted spleen\"    UMBILICAL HERNIA REPAIR          Family History:    Family History   Problem Relation Age of Onset    Diabetes Mother     Diabetes Father        Social History     Socioeconomic History    Marital status:      Spouse name: Not on file    Number of children: Not on file    Years of education: Not on file    Highest education level: Not on file   Occupational History    Occupation: window installation   Social Needs    Financial resource strain: Not on file    Food insecurity     Worry: Not on file     Inability: Not on file   Hydra Dx needs Medical: Not on file     Non-medical: Not on file   Tobacco Use    Smoking status: Former Smoker     Packs/day: 0.50     Years: 30.00     Pack years: 15.00     Types: Cigarettes     Quit date: 2021     Years since quittin.1    Smokeless tobacco: Never Used    Tobacco comment: counseled 14   Substance and Sexual Activity    Alcohol use: No    Drug use: No    Sexual activity: Not on file   Lifestyle    Physical activity     Days per week: Not on file     Minutes per session: Not on file    Stress: Not on file   Relationships    Social connections     Talks on phone: Not on file     Gets together: Not on file     Attends Protestant service: Not on file     Active member of club or organization: Not on file     Attends meetings of clubs or organizations: Not on file     Relationship status: Not on file    Intimate partner violence     Fear of current or ex partner: Not on file     Emotionally abused: Not on file     Physically abused: Not on file     Forced sexual activity: Not on file   Other Topics Concern    Not on file   Social History Narrative    Not on file       Nursing notes reviewed. ED Triage Vitals [21 0230]   Enc Vitals Group      BP (!) 148/90      Pulse 78      Resp 16      Temp 98.2 °F (36.8 °C)      Temp src       SpO2 97 %      Weight 240 lb (108.9 kg)      Height 5' 3\" (1.6 m)      Head Circumference       Peak Flow       Pain Score       Pain Loc       Pain Edu? Excl. in [address]? GENERAL:   Body mass index is 42.51 kg/m². Awake, alert. Well developed, well nourished with no apparent distress. Nontoxic-appearing, non-ill-appearing. HENT:   Normocephalic, Atraumatic, no lacerations. EYES:   Physical Exam  Eyes:      General: Lids are normal. Lids are everted, no foreign bodies appreciated. Vision grossly intact. Gaze aligned appropriately. No allergic shiner, visual field deficit or scleral icterus. Left eye: No foreign body, discharge or hordeolum. Extraocular Movements: Extraocular movements intact. Conjunctiva/sclera:      Right eye: Right conjunctiva is not injected. No chemosis, exudate or hemorrhage. Left eye: Left conjunctiva is injected. Chemosis present. No exudate or hemorrhage. Pupils: Pupils are equal, round, and reactive to light. Left eye: No corneal abrasion or fluorescein uptake. Leigh exam negative. Slit lamp exam:     Left eye: Anterior chamber quiet. Photophobia present. No corneal flare, corneal ulcer, foreign body, hyphema or hypopyon. Comments: In the left eye there does seem to be some fluorescein uptake in a horizontal fashion across the conjunctiva only but does not involve the cornea. NECK:  Trachea is midline. No stridor. CHEST:  Regular rate and regular rhythm, no murmurs/rubs/gallops,  normal S1/S2, chest wall non-tender. LUNGS:  No respiratory distress. No abdominal retractions, no sternal retractions  Clear to auscultation bilaterally, no wheezing, no rhochi, no rales  Speaking comfortably in full sentences  ABDOMEN:  Soft, non-tender, non-distended. No guarding. No rebound. No costovertebral angle tenderness to palpation. Normal BS, no organomegaly, no abdominal masses  EXTREMITIES:  Moves extremities x4 with purpose. Normal range of motion, no edema,  No tenderness, no deformity,  distal pulses present and equal bilaterally. SKIN:  Warm, dry and intact. NEUROLOGIC:  Normal mental status. Moving all extremities to command. Alert and oriented x4  without focal motor deficit or gross sensory deficit. Normal speech. PSYCHIATRIC:  Not anxious,  normal mood and affect,  Appropriate eye contact,  thoughts are linear and organized,  without delusions/hallucinations,  Not responding to internal stimuli,  responds appropriately to questions    LABS and DIAGNOSTIC RESULTS    RADIOLOGY  X-RAYS:  I have reviewed radiologic plain film image(s).   ALL OTHER NON-PLAIN FILM IMAGES SUCH AS CT, ULTRASOUND AND MRI HAVE BEEN READ BY THE RADIOLOGIST. No orders to display      LABS  No results found for this visit on 04/03/21. SCREENINGS  NIH Score     [facility]     [facility]    Heart Score       PROCEDURES    MEDICAL DECISION MAKING    Procedures/interventions/images ordered for this visit  No orders of the defined types were placed in this encounter. Medications ordered for this visit  No orders of the defined types were placed in this encounter. ED course notes for this visit       I wore N95 Envo mask with filter protection, facial shield and gloves when I evaluated the patient. I evaluated the patient in room 18/18    Patient seen and examined. No significant distress and they have stable vital signs. The history and exam (with fluorescein) are did not reveal a corneal abrasion specifically however he has a foreign body sensation and it is possible that he may have already remove the foreign body. There is no foreign body seen and no sign of globe rupture. Extraocular movements intact. Visual acuity is not affected. and pain management addressed. Patient verbalized comprehension of the plan for ophthalmic antibiotics and follow with the ophthalmologist tomorrow. It is understood that if the patient is not improving as expected or if other new symptoms or signs of concern develop, other etiologies or diagnoses may need to be considered requiring other tests, treatments, consultations, and/or admission. The diagnosis, plan, expected course, follow-up, and return precautions were discussed and all questions were answered. Final Impression    1. Sensation of foreign body in eye    2. Injury of left conjunctiva, initial encounter    3. Elevated blood pressure reading        Blood pressure (!) 148/90, pulse 76, temperature 98.2 °F (36.8 °C), resp. rate 16, height 5' 3\" (1.6 m), weight 240 lb (108.9 kg), SpO2 97 %.     Medication Summary  Patient was given scripts for the following ED. Patient will return to ED for new/worsening symptoms. The patient verbalized their understanding and agreement with the above plan. Please refer to AVS for further details regarding discharge instructions. Pt is in stable condition upon Discharge to home. The note was completed using Dragon voice recognition transcription. Every effort was made to ensure accuracy; however, inadvertent transcription errors may be present despite my best efforts to edit errors.     Asif Verdugo MD  [address] [facility], [place]  04/03/21 6008

## 2021-04-15 ENCOUNTER — TELEPHONE (OUTPATIENT)
Dept: ADMINISTRATIVE | Age: 56
End: 2021-04-15

## 2021-04-19 RX ORDER — INSULIN DETEMIR 100 [IU]/ML
50 INJECTION, SOLUTION SUBCUTANEOUS NIGHTLY
Qty: 15 PEN | Refills: 1 | Status: SHIPPED | OUTPATIENT
Start: 2021-04-19 | End: 2021-05-25

## 2021-04-20 RX ORDER — GABAPENTIN 400 MG/1
CAPSULE ORAL
Qty: 120 CAPSULE | Refills: 2 | Status: SHIPPED | OUTPATIENT
Start: 2021-04-20 | End: 2021-07-21

## 2021-05-18 RX ORDER — LISINOPRIL 5 MG/1
TABLET ORAL
Qty: 30 TABLET | Refills: 11 | Status: SHIPPED | OUTPATIENT
Start: 2021-05-18 | End: 2021-11-17 | Stop reason: SDUPTHER

## 2021-05-18 RX ORDER — CALCIUM CITRATE/VITAMIN D3 200MG-6.25
TABLET ORAL
Qty: 100 EACH | Refills: 10 | Status: SHIPPED | OUTPATIENT
Start: 2021-05-18 | End: 2022-05-19

## 2021-05-19 RX ORDER — DICLOFENAC SODIUM 75 MG/1
TABLET, DELAYED RELEASE ORAL
Qty: 30 TABLET | Refills: 2 | Status: SHIPPED | OUTPATIENT
Start: 2021-05-19 | End: 2021-05-25 | Stop reason: ALTCHOICE

## 2021-05-19 RX ORDER — METOPROLOL TARTRATE 50 MG/1
TABLET, FILM COATED ORAL
Qty: 60 TABLET | Refills: 2 | Status: SHIPPED | OUTPATIENT
Start: 2021-05-19 | End: 2021-09-09 | Stop reason: SDUPTHER

## 2021-05-19 RX ORDER — FENOFIBRATE 160 MG/1
TABLET ORAL
Qty: 30 TABLET | Refills: 2 | Status: SHIPPED | OUTPATIENT
Start: 2021-05-19 | End: 2021-08-27

## 2021-05-19 RX ORDER — PIOGLITAZONEHYDROCHLORIDE 30 MG/1
TABLET ORAL
Qty: 30 TABLET | Refills: 2 | Status: SHIPPED | OUTPATIENT
Start: 2021-05-19 | End: 2021-09-09 | Stop reason: SDUPTHER

## 2021-05-25 ENCOUNTER — OFFICE VISIT (OUTPATIENT)
Dept: FAMILY MEDICINE CLINIC | Age: 56
End: 2021-05-25
Payer: COMMERCIAL

## 2021-05-25 VITALS
RESPIRATION RATE: 16 BRPM | OXYGEN SATURATION: 98 % | WEIGHT: 246.8 LBS | HEART RATE: 74 BPM | SYSTOLIC BLOOD PRESSURE: 118 MMHG | HEIGHT: 63 IN | DIASTOLIC BLOOD PRESSURE: 74 MMHG | BODY MASS INDEX: 43.73 KG/M2

## 2021-05-25 DIAGNOSIS — F17.210 CIGARETTE SMOKER: ICD-10-CM

## 2021-05-25 DIAGNOSIS — I10 ESSENTIAL HYPERTENSION: ICD-10-CM

## 2021-05-25 DIAGNOSIS — E78.00 PURE HYPERCHOLESTEROLEMIA: ICD-10-CM

## 2021-05-25 DIAGNOSIS — J45.41 MODERATE PERSISTENT ASTHMA WITH ACUTE EXACERBATION: ICD-10-CM

## 2021-05-25 DIAGNOSIS — F33.0 MILD EPISODE OF RECURRENT MAJOR DEPRESSIVE DISORDER (HCC): ICD-10-CM

## 2021-05-25 DIAGNOSIS — G47.33 SEVERE OBSTRUCTIVE SLEEP APNEA: ICD-10-CM

## 2021-05-25 LAB
ALBUMIN SERPL-MCNC: 4.3 G/DL (ref 3.4–5)
ALP BLD-CCNC: 73 U/L (ref 40–129)
ALT SERPL-CCNC: 41 U/L (ref 10–40)
ANION GAP SERPL CALCULATED.3IONS-SCNC: 12 MMOL/L (ref 3–16)
AST SERPL-CCNC: 34 U/L (ref 15–37)
BILIRUB SERPL-MCNC: 0.4 MG/DL (ref 0–1)
BILIRUBIN DIRECT: <0.2 MG/DL (ref 0–0.3)
BILIRUBIN, INDIRECT: ABNORMAL MG/DL (ref 0–1)
BUN BLDV-MCNC: 15 MG/DL (ref 7–20)
CALCIUM SERPL-MCNC: 9.1 MG/DL (ref 8.3–10.6)
CHLORIDE BLD-SCNC: 98 MMOL/L (ref 99–110)
CHOLESTEROL, TOTAL: 162 MG/DL (ref 0–199)
CO2: 24 MMOL/L (ref 21–32)
CREAT SERPL-MCNC: 0.9 MG/DL (ref 0.9–1.3)
GFR AFRICAN AMERICAN: >60
GFR NON-AFRICAN AMERICAN: >60
GLUCOSE BLD-MCNC: 123 MG/DL (ref 70–99)
HDLC SERPL-MCNC: 25 MG/DL (ref 40–60)
LDL CHOLESTEROL CALCULATED: ABNORMAL MG/DL
LDL CHOLESTEROL DIRECT: 33 MG/DL
POTASSIUM SERPL-SCNC: 4 MMOL/L (ref 3.5–5.1)
SODIUM BLD-SCNC: 134 MMOL/L (ref 136–145)
TOTAL PROTEIN: 6.8 G/DL (ref 6.4–8.2)
TRIGL SERPL-MCNC: 854 MG/DL (ref 0–150)
VLDLC SERPL CALC-MCNC: ABNORMAL MG/DL

## 2021-05-25 PROCEDURE — 1036F TOBACCO NON-USER: CPT | Performed by: INTERNAL MEDICINE

## 2021-05-25 PROCEDURE — 99214 OFFICE O/P EST MOD 30 MIN: CPT | Performed by: INTERNAL MEDICINE

## 2021-05-25 PROCEDURE — G8417 CALC BMI ABV UP PARAM F/U: HCPCS | Performed by: INTERNAL MEDICINE

## 2021-05-25 PROCEDURE — G8427 DOCREV CUR MEDS BY ELIG CLIN: HCPCS | Performed by: INTERNAL MEDICINE

## 2021-05-25 PROCEDURE — 2022F DILAT RTA XM EVC RTNOPTHY: CPT | Performed by: INTERNAL MEDICINE

## 2021-05-25 PROCEDURE — 3017F COLORECTAL CA SCREEN DOC REV: CPT | Performed by: INTERNAL MEDICINE

## 2021-05-25 PROCEDURE — 3051F HG A1C>EQUAL 7.0%<8.0%: CPT | Performed by: INTERNAL MEDICINE

## 2021-05-25 PROCEDURE — 36415 COLL VENOUS BLD VENIPUNCTURE: CPT | Performed by: INTERNAL MEDICINE

## 2021-05-25 ASSESSMENT — ENCOUNTER SYMPTOMS
ALLERGIC/IMMUNOLOGIC NEGATIVE: 1
RESPIRATORY NEGATIVE: 1
GASTROINTESTINAL NEGATIVE: 1

## 2021-05-25 ASSESSMENT — SOCIAL DETERMINANTS OF HEALTH (SDOH): HOW HARD IS IT FOR YOU TO PAY FOR THE VERY BASICS LIKE FOOD, HOUSING, MEDICAL CARE, AND HEATING?: SOMEWHAT HARD

## 2021-05-25 NOTE — PROGRESS NOTES
Subjective:      Patient ID: Luisa Cabrera is a 64 y.o. male. HPI  Severe obstructive sleep apnea  Seen by Sleep lab last yr may have issue w/ pressure. . Observed apnea. Sleep test abnormal. Doing well w/ BI-pap. DM type 2, uncontrolled, with neuropathy ([address]  Sugars are 100-160, diet is fair, weight is stable up 5 lbs since last visit, no change in previously reported moderate neuropathy of feet, no change in vision, no claudication, no foot ulcers, no new skin lesions. No change in medications(Basiglar 50 u at bed time and Actos). No c/o with meds. Last eye exam 9/2020 ? was good. Essential hypertension  No change in meds, no c/o with meds, no chest pain, SOB, palpatations, or syncope. Home bp is 110-120s/70-80s. Moderate persistent asthma with acute exacerbation  Now using Symbicort and Spiriva. cough and congestion much improved. Still smoking a few cig/d. Cigarette smoker  Still smoking down from 1/2 pk to 2-4 cig/d. Trying to quit. Had severe URI in past requiring Prednisone and antibiotic from ER and Urgernt care. Recently less cough, wheezing, Rhinitis. Pure hypercholesterolemia  Wt way up since last visit. Diet fair. No c/o w/ meds. Not taking statin. Mild episode of recurrent major depressive disorder (HCC)  Chronic. Not on meds. Pt not ready to take meds. Discussed options. Family and work issues. Review of Systems   Constitutional: Positive for fatigue. Respiratory: Negative. Cardiovascular: Negative. Gastrointestinal: Negative. Endocrine: Negative. Musculoskeletal: Positive for arthralgias and myalgias. Skin: Negative. Allergic/Immunologic: Negative. Neurological: Negative. Hematological: Negative. Psychiatric/Behavioral: Negative.       Vitals:    05/25/21 0830 05/25/21 0859   BP: 120/78 118/74   Pulse: 74    Resp: 16    SpO2: 98%    Weight: 246 lb 12.8 oz (111.9 kg)    Height: 5' 3\" (1.6 m)      Objective:   Physical Exam  Constitutional: General: He is not in acute distress. Appearance: Normal appearance. He is well-developed. He is obese. He is not ill-appearing, toxic-appearing or diaphoretic. HENT:      Head: Normocephalic and atraumatic. Mouth/Throat:      Mouth: Mucous membranes are moist.      Pharynx: Oropharynx is clear. No oropharyngeal exudate or posterior oropharyngeal erythema. Eyes:      Extraocular Movements: Extraocular movements intact. Conjunctiva/sclera: Conjunctivae normal.      Pupils: Pupils are equal, round, and reactive to light. Neck:      Thyroid: No thyroid mass or thyromegaly. Vascular: Normal carotid pulses. No carotid bruit, hepatojugular reflux or JVD. Trachea: Trachea and phonation normal.   Cardiovascular:      Rate and Rhythm: Normal rate and regular rhythm. No extrasystoles are present. Chest Wall: PMI is not displaced. Pulses: Normal pulses. No decreased pulses. Carotid pulses are 2+ on the right side and 2+ on the left side. Radial pulses are 2+ on the right side and 2+ on the left side. Femoral pulses are 2+ on the right side and 2+ on the left side. Popliteal pulses are 2+ on the right side and 2+ on the left side. Dorsalis pedis pulses are 2+ on the right side and 2+ on the left side. Posterior tibial pulses are 2+ on the right side and 2+ on the left side. Heart sounds: Normal heart sounds. No murmur heard. No friction rub. No gallop. Pulmonary:      Effort: Pulmonary effort is normal. No tachypnea, bradypnea, accessory muscle usage or respiratory distress. Breath sounds: Normal breath sounds. No decreased breath sounds, wheezing, rhonchi or rales. Abdominal:      Hernia: No hernia is present. There is no hernia in the ventral area. Musculoskeletal:         General: No swelling, tenderness, deformity or signs of injury.       Cervical back: Full passive range of motion without pain, normal range of motion

## 2021-05-25 NOTE — PATIENT INSTRUCTIONS
Severe Obstructive Sleep Apnea. Continue Bi-pap. Call if new c/o. Must lose weight. Pure Hypercholesterolemia. Will do labs and adjust med if needed. Must improve diet and lose weight. Goal is <200 lbs. Cigarette Smoker. MUST STOP!!!!! NICOTINE GUM DISCUSSED. Moderate Persistent Asthma With Acute Exacerbation. Continue Symbicort and Spiriva. Albuterol as needed. Call if need Albuterol more than 3x/week. DM Type 2, Uncontrolled, With Neuropathy (Hcc). Will do labs and adjust meds after results are evaluated. To continue to improve diet and lose weight. To follow Diabetic diet. If needs further help w/ Diabetic diet to call and will refer back to dietician. Home sugar checks. To call if sudden change up or down in sugars. To do regular foot checks. Call if new problems. Essential Hypertension. Continue present meds. Home BP checks. Call if>140/90. Improve diet. Avoid caffeine and salt. Call if new c/o w/ meds. Mild Episode of Recurrent Major Depressive Disorder (Hcc). To call if new c/o. Patient Education        Starting a Weight Loss Plan: Care Instructions  Overview     If you're thinking about losing weight, it can be hard to know where to start. Your doctor can help you set up a weight loss plan that best meets your needs. You may want to take a class on nutrition or exercise, or you could join a weight loss support group. If you have questions about how to make changes to your eating or exercise habits, ask your doctor about seeing a registered dietitian or an exercise specialist.  It can be a big challenge to lose weight. But you don't have to make huge changes at once. Make small changes, and stick with them. When those changes become habit, add a few more changes. If you don't think you're ready to make changes right now, try to pick a date in the future. Make an appointment to see your doctor to discuss whether the time is right for you to start a plan.   Follow-up care is a key part of your treatment and safety. Be sure to make and go to all appointments, and call your doctor if you are having problems. It's also a good idea to know your test results and keep a list of the medicines you take. How can you care for yourself at home? · Set realistic goals. Many people expect to lose much more weight than is likely. A weight loss of 5% to 10% of your body weight may be enough to improve your health. · Get family and friends involved to provide support. Talk to them about why you are trying to lose weight, and ask them to help. They can help by participating in exercise and having meals with you, even if they may be eating something different. · Find what works best for you. If you do not have time or do not like to cook, a program that offers meal replacement bars or shakes may be better for you. Or if you like to prepare meals, finding a plan that includes daily menus and recipes may be best.  · Ask your doctor about other health professionals who can help you achieve your weight loss goals. ? A dietitian can help you make healthy changes in your diet. ? An exercise specialist or  can help you develop a safe and effective exercise program.  ? A counselor or psychiatrist can help you cope with issues such as depression, anxiety, or family problems that can make it hard to focus on weight loss. · Consider joining a support group for people who are trying to lose weight. Your doctor can suggest groups in your area. Where can you learn more? Go to [email or web address] org and sign in to your [employer] account. Enter V422 in the [facility] box to learn more about \"Starting a Weight Loss Plan: Care Instructions. \"     If you do not have an account, please click on the \"Sign Up Now\" link. Current as of: September 23, 2020               Content Version: 12.8  © 4244-9226 [employer].    Care instructions adapted under license by

## 2021-05-25 NOTE — ASSESSMENT & PLAN NOTE
Still smoking down from 1/2 pk to 2-4 cig/d. Trying to quit. Had severe URI in past requiring Prednisone and antibiotic from ER and Urgernt care. Recently less cough, wheezing, Rhinitis.

## 2021-05-25 NOTE — ASSESSMENT & PLAN NOTE
Sugars are 100-160, diet is fair, weight is stable up 5 lbs since last visit, no change in previously reported moderate neuropathy of feet, no change in vision, no claudication, no foot ulcers, no new skin lesions. No change in medications(Basiglar 50 u at bed time and Actos). No c/o with meds. Last eye exam 9/2020 ? was good.

## 2021-05-26 DIAGNOSIS — E78.00 PURE HYPERCHOLESTEROLEMIA: Primary | ICD-10-CM

## 2021-05-26 LAB
ESTIMATED AVERAGE GLUCOSE: 159.9 MG/DL
HBA1C MFR BLD: 7.2 %

## 2021-05-26 RX ORDER — ATORVASTATIN CALCIUM 40 MG/1
40 TABLET, FILM COATED ORAL DAILY
Qty: 90 TABLET | Refills: 3 | Status: SHIPPED | OUTPATIENT
Start: 2021-05-26 | End: 2021-11-17 | Stop reason: SDUPTHER

## 2021-07-21 RX ORDER — GABAPENTIN 400 MG/1
CAPSULE ORAL
Qty: 120 CAPSULE | Refills: 2 | Status: SHIPPED | OUTPATIENT
Start: 2021-07-21 | End: 2021-10-22

## 2021-08-27 RX ORDER — OMEGA-3-ACID ETHYL ESTERS 1 G/1
CAPSULE, LIQUID FILLED ORAL
Qty: 120 CAPSULE | Refills: 3 | Status: SHIPPED | OUTPATIENT
Start: 2021-08-27 | End: 2021-11-17 | Stop reason: SDUPTHER

## 2021-08-27 RX ORDER — FENOFIBRATE 160 MG/1
TABLET ORAL
Qty: 30 TABLET | Refills: 2 | Status: SHIPPED | OUTPATIENT
Start: 2021-08-27 | End: 2021-11-17 | Stop reason: SDUPTHER

## 2021-08-27 RX ORDER — DICLOFENAC SODIUM 75 MG/1
TABLET, DELAYED RELEASE ORAL
Qty: 30 TABLET | Refills: 2 | Status: SHIPPED | OUTPATIENT
Start: 2021-08-27 | End: 2021-11-19

## 2021-08-27 RX ORDER — LANCETS 30 GAUGE
EACH MISCELLANEOUS
Qty: 100 EACH | Refills: 10 | Status: SHIPPED | OUTPATIENT
Start: 2021-08-27 | End: 2022-07-05

## 2021-08-31 ENCOUNTER — OFFICE VISIT (OUTPATIENT)
Dept: FAMILY MEDICINE CLINIC | Age: 56
End: 2021-08-31
Payer: COMMERCIAL

## 2021-08-31 VITALS
WEIGHT: 246 LBS | DIASTOLIC BLOOD PRESSURE: 68 MMHG | HEIGHT: 63 IN | BODY MASS INDEX: 43.59 KG/M2 | SYSTOLIC BLOOD PRESSURE: 124 MMHG | RESPIRATION RATE: 16 BRPM | HEART RATE: 71 BPM | OXYGEN SATURATION: 98 %

## 2021-08-31 DIAGNOSIS — E66.01 CLASS 3 SEVERE OBESITY DUE TO EXCESS CALORIES WITH SERIOUS COMORBIDITY AND BODY MASS INDEX (BMI) OF 40.0 TO 44.9 IN ADULT (HCC): ICD-10-CM

## 2021-08-31 DIAGNOSIS — F17.210 CIGARETTE SMOKER: ICD-10-CM

## 2021-08-31 DIAGNOSIS — J41.1 MUCOPURULENT CHRONIC BRONCHITIS (HCC): ICD-10-CM

## 2021-08-31 DIAGNOSIS — E78.00 PURE HYPERCHOLESTEROLEMIA: ICD-10-CM

## 2021-08-31 DIAGNOSIS — G47.33 SEVERE OBSTRUCTIVE SLEEP APNEA: ICD-10-CM

## 2021-08-31 DIAGNOSIS — I10 ESSENTIAL HYPERTENSION: ICD-10-CM

## 2021-08-31 PROCEDURE — 99214 OFFICE O/P EST MOD 30 MIN: CPT | Performed by: INTERNAL MEDICINE

## 2021-08-31 PROCEDURE — 3051F HG A1C>EQUAL 7.0%<8.0%: CPT | Performed by: INTERNAL MEDICINE

## 2021-08-31 PROCEDURE — 3023F SPIROM DOC REV: CPT | Performed by: INTERNAL MEDICINE

## 2021-08-31 PROCEDURE — G8417 CALC BMI ABV UP PARAM F/U: HCPCS | Performed by: INTERNAL MEDICINE

## 2021-08-31 PROCEDURE — 3017F COLORECTAL CA SCREEN DOC REV: CPT | Performed by: INTERNAL MEDICINE

## 2021-08-31 PROCEDURE — G8926 SPIRO NO PERF OR DOC: HCPCS | Performed by: INTERNAL MEDICINE

## 2021-08-31 PROCEDURE — 1036F TOBACCO NON-USER: CPT | Performed by: INTERNAL MEDICINE

## 2021-08-31 PROCEDURE — 90715 TDAP VACCINE 7 YRS/> IM: CPT | Performed by: INTERNAL MEDICINE

## 2021-08-31 PROCEDURE — G8427 DOCREV CUR MEDS BY ELIG CLIN: HCPCS | Performed by: INTERNAL MEDICINE

## 2021-08-31 PROCEDURE — 2022F DILAT RTA XM EVC RTNOPTHY: CPT | Performed by: INTERNAL MEDICINE

## 2021-08-31 ASSESSMENT — ENCOUNTER SYMPTOMS
EYES NEGATIVE: 1
GASTROINTESTINAL NEGATIVE: 1
ALLERGIC/IMMUNOLOGIC NEGATIVE: 1
SHORTNESS OF BREATH: 1

## 2021-08-31 NOTE — PATIENT INSTRUCTIONS
A/P:     Severe Obstructive Sleep Apnea. Continue C-pap. Lose weight. See Sleep clinic yearly. Class 3 Severe Obesity Due to Excess Calories With Serious Comorbidity and Body Mass Index (Bmi) of 40.0 to 44.9 in Adult ([facility]). Must improve diet and lose some weight. Call if needs further assistance. Follow DM diet. Pure Hypercholesterolemia. Will do labs and adjust meds if needed. Cigarette Smoker. MUST STOP!!!!! USE GUM OR LOZENGES TILL STOP!!!     Mucopurulent Chronic Bronchitis ([facility]). Continue Symbicort and Spiriva. Stop smoking. Use albuterol as needed. Call if worsens. DM Type 2, Uncontrolled, With Neuropathy ([facility]). Will do labs and adjust meds after results are evaluated. To continue to improve diet and lose weight. To follow Diabetic diet. If needs further help w/ Diabetic diet to call and will refer back to dietician. Home sugar checks. To call if sudden change up or down in sugars. To do regular foot checks. Call if new problems. Essential Hypertension. Continue present meds. Home BP checks. Call if>140/90. Improve diet. Avoid caffeine and salt. Call if new c/o w/ meds.       Tdap give today, Flu shot in [place], COVID booster in Dec.            C Denia Andres MD

## 2021-08-31 NOTE — PROGRESS NOTES
Donal [place] presents today for   Chief Complaint   Patient presents with    Diabetes     NOT fasting        Cigarette smoker  Still smoking down from 1/2 pk to 2-4 cig/d. Trying to quit. Had severe URI in past requiring Prednisone and antibiotic from ER and Urgernt care. Recently less cough, wheezing, Rhinitis. DM type 2, uncontrolled, with neuropathy ([address]  Sugars are 100-140, diet is fair, weight is stable up 5 lbs since last visit, slightly  worse than  previously reported moderate neuropathy of feet, no change in vision, no claudication, no foot ulcers, no new skin lesions. No change in medications(Basiglar 50 u at bed time and Actos). No c/o with meds. Last eye exam 9/2020 ? was good. Essential hypertension  No change in meds, no c/o with meds, no chest pain, SOB, palpatations, or syncope. Home bp is 110-120s/70-80s. Pure hypercholesterolemia  Wt up some since last visit. Diet fair. No c/o w/ meds. Not taking statin. Class 3 severe obesity due to excess calories with serious comorbidity and body mass index (BMI) of 40.0 to 44.9 in adult (HCC)  Wt is up 20 lbs in last 12 mo    Mucopurulent chronic bronchitis (HCC)  Now using Symbicort and Spiriva. cough and congestion much improved. Still smoking. Albuterol about twice a week. Severe obstructive sleep apnea  Seen by Sleep lab several yrs ago. . Observed apnea. Sleep test abnormal. Doing well w/ C-pap. Review of Systems   Constitutional: Positive for fatigue. HENT: Positive for postnasal drip. Eyes: Negative. Respiratory: Positive for shortness of breath. Cardiovascular: Negative. Gastrointestinal: Negative. Endocrine: Negative. Genitourinary: Negative. Musculoskeletal: Negative. Skin: Negative. Allergic/Immunologic: Negative. Neurological: Negative. Hematological: Negative. Psychiatric/Behavioral: Positive for sleep disturbance.       Vitals:    08/31/21 0827 08/31/21 0913   BP: 126/62 124/68 Pulse: 71    Resp: 16    SpO2: 98%    Weight: 246 lb (111.6 kg)    Height: 5' 3\" (1.6 m)          Physical Exam  Constitutional:       General: He is not in acute distress. Appearance: Normal appearance. He is well-developed. He is obese. He is not ill-appearing, toxic-appearing or diaphoretic. HENT:      Head: Normocephalic and atraumatic. Neck:      Thyroid: No thyroid mass or thyromegaly. Vascular: Normal carotid pulses. No carotid bruit, hepatojugular reflux or JVD. Trachea: Trachea and phonation normal.   Cardiovascular:      Rate and Rhythm: Normal rate and regular rhythm. No extrasystoles are present. Chest Wall: PMI is not displaced. Pulses: Normal pulses. No decreased pulses. Carotid pulses are 2+ on the right side and 2+ on the left side. Radial pulses are 2+ on the right side and 2+ on the left side. Femoral pulses are 2+ on the right side and 2+ on the left side. Popliteal pulses are 2+ on the right side and 2+ on the left side. Dorsalis pedis pulses are 2+ on the right side and 2+ on the left side. Posterior tibial pulses are 2+ on the right side and 2+ on the left side. Heart sounds: Normal heart sounds. No murmur heard. No friction rub. No gallop. Pulmonary:      Effort: Pulmonary effort is normal. No tachypnea, bradypnea, accessory muscle usage or respiratory distress. Breath sounds: Normal breath sounds. No decreased breath sounds, wheezing, rhonchi or rales. Abdominal:      Hernia: No hernia is present. There is no hernia in the ventral area. Musculoskeletal:      Cervical back: Full passive range of motion without pain, normal range of motion and neck supple. No rigidity or tenderness. Right lower leg: No edema. Left lower leg: No edema. Lymphadenopathy:      Cervical: No cervical adenopathy. Skin:     General: Skin is warm and dry.       Capillary Refill: Capillary refill takes less than 2 seconds. Coloration: Skin is not jaundiced or pale. Findings: No abrasion, bruising, erythema, lesion or rash. Nails: There is no clubbing. Neurological:      General: No focal deficit present. Mental Status: He is alert and oriented to person, place, and time. Mental status is at baseline. He is not disoriented. Cranial Nerves: No cranial nerve deficit. Sensory: No sensory deficit. Motor: No weakness, tremor, atrophy or abnormal muscle tone. Coordination: Coordination normal.      Gait: Gait normal.      Comments: Diabetic foot exam was normal with intact light touch and vibratory senses. Still has numbness and tingling at night improved some w/ Gabapentin. Psychiatric:         Mood and Affect: Mood is anxious and depressed. Speech: Speech normal.         Behavior: Behavior normal.         Thought Content: Thought content normal.         Judgment: Judgment normal.          A/P:     Severe Obstructive Sleep Apnea. Continue C-pap. Lose weight. See Sleep clinic yearly. Class 3 Severe Obesity Due to Excess Calories With Serious Comorbidity and Body Mass Index (Bmi) of 40.0 to 44.9 in Adult ([facility]). Must improve diet and lose some weight. Call if needs further assistance. Follow DM diet. Pure Hypercholesterolemia. Will do labs and adjust meds if needed. Cigarette Smoker. MUST STOP!!!!! USE GUM OR LOZENGES TILL STOP!!!     Mucopurulent Chronic Bronchitis ([facility]). Continue Symbicort and Spiriva. Stop smoking. Use albuterol as needed. Call if worsens. DM Type 2, Uncontrolled, With Neuropathy ([facility]). Will do labs and adjust meds after results are evaluated. To continue to improve diet and lose weight. To follow Diabetic diet. If needs further help w/ Diabetic diet to call and will refer back to dietician. Home sugar checks. To call if sudden change up or down in sugars. To do regular foot checks. Call if new problems. Essential Hypertension. Continue present meds. Home BP checks. Call if>140/90. Improve diet. Avoid caffeine and salt. Call if new c/o w/ meds.       Tdap give today, Flu shot in [place], COVID booster in Dec.            C Bryanna Person MD

## 2021-08-31 NOTE — ASSESSMENT & PLAN NOTE
Sugars are 100-140, diet is fair, weight is stable up 5 lbs since last visit, slightly  worse than  previously reported moderate neuropathy of feet, no change in vision, no claudication, no foot ulcers, no new skin lesions. No change in medications(Basiglar 50 u at bed time and Actos). No c/o with meds. Last eye exam 9/2020 ? was good.

## 2021-08-31 NOTE — ASSESSMENT & PLAN NOTE
Now using Symbicort and Spiriva. cough and congestion much improved. Still smoking. Albuterol about twice a week.

## 2021-09-09 RX ORDER — PIOGLITAZONEHYDROCHLORIDE 30 MG/1
30 TABLET ORAL DAILY
Qty: 90 TABLET | Refills: 2 | Status: SHIPPED | OUTPATIENT
Start: 2021-09-09 | End: 2021-11-17 | Stop reason: SDUPTHER

## 2021-09-09 RX ORDER — METOPROLOL TARTRATE 50 MG/1
TABLET, FILM COATED ORAL
Qty: 60 TABLET | Refills: 5 | Status: SHIPPED | OUTPATIENT
Start: 2021-09-09 | End: 2021-11-17 | Stop reason: SDUPTHER

## 2021-09-24 RX ORDER — INSULIN DETEMIR 100 [IU]/ML
INJECTION, SOLUTION SUBCUTANEOUS
Qty: 15 ML | Refills: 5 | Status: SHIPPED | OUTPATIENT
Start: 2021-09-24 | End: 2021-11-18 | Stop reason: SDUPTHER

## 2021-10-20 RX ORDER — PEN NEEDLE, DIABETIC 31 GX3/16"
NEEDLE, DISPOSABLE MISCELLANEOUS
Qty: 100 EACH | Refills: 5 | Status: SHIPPED | OUTPATIENT
Start: 2021-10-20 | End: 2022-04-21

## 2021-10-20 RX ORDER — DILTIAZEM HYDROCHLORIDE 60 MG/1
TABLET, FILM COATED ORAL
Qty: 3 EACH | Refills: 3 | Status: SHIPPED | OUTPATIENT
Start: 2021-10-20

## 2021-10-22 RX ORDER — GABAPENTIN 400 MG/1
CAPSULE ORAL
Qty: 120 CAPSULE | Refills: 2 | Status: SHIPPED | OUTPATIENT
Start: 2021-10-22 | End: 2021-11-17 | Stop reason: SDUPTHER

## 2021-11-15 ENCOUNTER — TELEPHONE (OUTPATIENT)
Dept: FAMILY MEDICINE CLINIC | Age: 56
End: 2021-11-15

## 2021-11-15 NOTE — TELEPHONE ENCOUNTER
[facility] called from [address] regarding the Levemir insulin prescription. It is on long term back order (they are having trouble with the plastic housing). Alternative insulin:  Basaglar Quick Pen, Lantus Solostar and Semglee Flex Pen. Prescription please.

## 2021-11-16 RX ORDER — INSULIN GLARGINE 100 [IU]/ML
50 INJECTION, SOLUTION SUBCUTANEOUS NIGHTLY
Qty: 5 PEN | Refills: 3 | Status: SHIPPED | OUTPATIENT
Start: 2021-11-16

## 2021-11-17 ENCOUNTER — TELEPHONE (OUTPATIENT)
Dept: FAMILY MEDICINE CLINIC | Age: 56
End: 2021-11-17

## 2021-11-17 RX ORDER — LISINOPRIL 5 MG/1
TABLET ORAL
Qty: 30 TABLET | Refills: 11 | Status: SHIPPED | OUTPATIENT
Start: 2021-11-17 | End: 2022-05-17 | Stop reason: SDUPTHER

## 2021-11-17 RX ORDER — FENOFIBRATE 160 MG/1
TABLET ORAL
Qty: 30 TABLET | Refills: 2 | Status: SHIPPED | OUTPATIENT
Start: 2021-11-17 | End: 2021-12-16 | Stop reason: SDUPTHER

## 2021-11-17 RX ORDER — PIOGLITAZONEHYDROCHLORIDE 30 MG/1
30 TABLET ORAL DAILY
Qty: 90 TABLET | Refills: 2 | Status: SHIPPED | OUTPATIENT
Start: 2021-11-17 | End: 2022-05-19 | Stop reason: SDUPTHER

## 2021-11-17 RX ORDER — GABAPENTIN 400 MG/1
CAPSULE ORAL
Qty: 120 CAPSULE | Refills: 2 | Status: SHIPPED | OUTPATIENT
Start: 2021-11-17 | End: 2021-12-16

## 2021-11-17 RX ORDER — OMEGA-3-ACID ETHYL ESTERS 1 G/1
CAPSULE, LIQUID FILLED ORAL
Qty: 120 CAPSULE | Refills: 3 | Status: SHIPPED | OUTPATIENT
Start: 2021-11-17 | End: 2022-04-22

## 2021-11-17 RX ORDER — ATORVASTATIN CALCIUM 40 MG/1
40 TABLET, FILM COATED ORAL DAILY
Qty: 90 TABLET | Refills: 3 | Status: SHIPPED | OUTPATIENT
Start: 2021-11-17 | End: 2022-05-19

## 2021-11-17 RX ORDER — METOPROLOL TARTRATE 50 MG/1
TABLET, FILM COATED ORAL
Qty: 60 TABLET | Refills: 5 | Status: SHIPPED | OUTPATIENT
Start: 2021-11-17 | End: 2022-08-12

## 2021-11-17 NOTE — TELEPHONE ENCOUNTER
Renie RobertLucilla Blizzard is requesting refill(s)   Last OV 08/31/2021 (pertaining to medication)    Next office visit scheduled or attempted 12/06/2021

## 2021-11-17 NOTE — TELEPHONE ENCOUNTER
----- Message from Khurram Burton sent at 11/17/2021  8:38 AM EST -----  Subject: Refill Request    QUESTIONS  Name of Medication? gabapentin (NEURONTIN) 400 MG capsule  Patient-reported dosage and instructions? 400mg - one morning, two mid day   , one at night   How many days do you have left? 0  Preferred Pharmacy? [address] [facility] phone number (if available)? [phone]  ---------------------------------------------------------------------------  --------------,  Name of Medication? lisinopril (PRINIVIL;ZESTRIL) 5 MG tablet  Patient-reported dosage and instructions? 5mg tab twice daily (one morning   , one at night)  How many days do you have left? 0  Preferred Pharmacy? [address] [facility] phone number (if available)? [phone]  ---------------------------------------------------------------------------  --------------,  Name of Medication? atorvastatin (LIPITOR) 40 MG tablet  Patient-reported dosage and instructions? 40mg tab -once at night  How many days do you have left? 0  Preferred Pharmacy? [address] [facility] phone number (if available)? [phone]  ---------------------------------------------------------------------------  --------------  [facility]  What is the best way for the office to contact you?  Do not leave any   message, patient will call back for answer  Preferred Call Back Phone Number? [phone]

## 2021-11-17 NOTE — TELEPHONE ENCOUNTER
----- Message from Wan Culverh sent at 11/17/2021  8:45 AM EST -----  Subject: Refill Request    QUESTIONS  Name of Medication? gabapentin (NEURONTIN) 400 MG capsule  Patient-reported dosage and instructions? takes 4 times a day  How many days do you have left? 0  Preferred Pharmacy? [facility] phone number (if available)? [phone]  Additional Information for Provider? #Patient is completely out of   medication and unable to reach [facility].   ---------------------------------------------------------------------------  --------------,  Name of Medication? atorvastatin (LIPITOR) 40 MG tablet  Patient-reported dosage and instructions? takes twice a day  How many days do you have left? 0  Preferred Pharmacy? [facility] phone number (if available)? [phone]  ---------------------------------------------------------------------------  --------------,  Name of Medication? metoprolol tartrate (LOPRESSOR) 50 MG tablet  Patient-reported dosage and instructions? takes once at night   How many days do you have left? 0  Preferred Pharmacy? [facility] phone number (if available)? [phone]  ---------------------------------------------------------------------------  --------------,  Name of Medication? omega-3 acid ethyl esters (LOVAZA) 1 g capsule  Patient-reported dosage and instructions? takes two in morning, two in   afternoon   How many days do you have left? 0  Preferred Pharmacy? [address] [facility] phone number (if available)? [phone]  ---------------------------------------------------------------------------  --------------,  Name of Medication? fenofibrate (TRIGLIDE) 160 MG tablet  Patient-reported dosage and instructions? takes once in morning   How many days do you have left? 0  Preferred Pharmacy? [address] [facility] phone number (if available)? [phone]  ---------------------------------------------------------------------------  --------------,  Name of Medication? lisinopril (PRINIVIL;ZESTRIL) 5 MG tablet  Patient-reported dosage and instructions? takes once in morning   How many days do you have left? 0  Preferred Pharmacy? [facility] phone number (if available)? [phone]  ---------------------------------------------------------------------------  --------------,  Name of Medication? pioglitazone (ACTOS) 30 MG tablet  Patient-reported dosage and instructions? takes once in morning   How many days do you have left? 0  Preferred Pharmacy? [facility] phone number (if available)? [phone]  Additional Information for Provider? #Patient is completely out of   medication and unable to reach [facility] mail in. Please advise.   ---------------------------------------------------------------------------  --------------  CALL BACK INFO  What is the best way for the office to contact you?  OK to leave message on   voicemail  Preferred Call Back Phone Number? [phone]

## 2021-11-18 RX ORDER — INSULIN DETEMIR 100 [IU]/ML
INJECTION, SOLUTION SUBCUTANEOUS
Qty: 45 ML | Refills: 1 | Status: SHIPPED | OUTPATIENT
Start: 2021-11-18 | End: 2022-05-19

## 2021-11-19 RX ORDER — DICLOFENAC SODIUM 75 MG/1
TABLET, DELAYED RELEASE ORAL
Qty: 30 TABLET | Refills: 2 | Status: SHIPPED | OUTPATIENT
Start: 2021-11-19 | End: 2021-12-16

## 2021-12-16 ENCOUNTER — OFFICE VISIT (OUTPATIENT)
Dept: FAMILY MEDICINE CLINIC | Age: 56
End: 2021-12-16
Payer: COMMERCIAL

## 2021-12-16 VITALS
OXYGEN SATURATION: 97 % | DIASTOLIC BLOOD PRESSURE: 70 MMHG | HEIGHT: 63 IN | BODY MASS INDEX: 43.12 KG/M2 | SYSTOLIC BLOOD PRESSURE: 122 MMHG | HEART RATE: 67 BPM | WEIGHT: 243.4 LBS

## 2021-12-16 DIAGNOSIS — Z23 NEED FOR PNEUMOCOCCAL VACCINATION: ICD-10-CM

## 2021-12-16 DIAGNOSIS — Z23 NEED FOR INFLUENZA VACCINATION: ICD-10-CM

## 2021-12-16 DIAGNOSIS — E11.69 TYPE 2 DIABETES MELLITUS WITH OTHER SPECIFIED COMPLICATION, WITH LONG-TERM CURRENT USE OF INSULIN (HCC): Primary | ICD-10-CM

## 2021-12-16 DIAGNOSIS — Z79.4 TYPE 2 DIABETES MELLITUS WITH OTHER SPECIFIED COMPLICATION, WITH LONG-TERM CURRENT USE OF INSULIN (HCC): Primary | ICD-10-CM

## 2021-12-16 DIAGNOSIS — J44.9 CHRONIC OBSTRUCTIVE PULMONARY DISEASE, UNSPECIFIED COPD TYPE (HCC): ICD-10-CM

## 2021-12-16 DIAGNOSIS — E78.5 HYPERLIPIDEMIA, UNSPECIFIED HYPERLIPIDEMIA TYPE: ICD-10-CM

## 2021-12-16 DIAGNOSIS — G62.9 NEUROPATHY: ICD-10-CM

## 2021-12-16 DIAGNOSIS — Z23 NEED FOR HEPATITIS B VACCINATION: ICD-10-CM

## 2021-12-16 DIAGNOSIS — F17.210 CIGARETTE NICOTINE DEPENDENCE WITHOUT COMPLICATION: ICD-10-CM

## 2021-12-16 LAB
A/G RATIO: 2 (ref 1.1–2.2)
ALBUMIN SERPL-MCNC: 4.5 G/DL (ref 3.4–5)
ALP BLD-CCNC: 78 U/L (ref 40–129)
ALT SERPL-CCNC: 31 U/L (ref 10–40)
ANION GAP SERPL CALCULATED.3IONS-SCNC: 11 MMOL/L (ref 3–16)
AST SERPL-CCNC: 29 U/L (ref 15–37)
BASOPHILS ABSOLUTE: 0 K/UL (ref 0–0.2)
BASOPHILS RELATIVE PERCENT: 0.5 %
BILIRUB SERPL-MCNC: 0.5 MG/DL (ref 0–1)
BUN BLDV-MCNC: 16 MG/DL (ref 7–20)
CALCIUM SERPL-MCNC: 9.5 MG/DL (ref 8.3–10.6)
CHLORIDE BLD-SCNC: 103 MMOL/L (ref 99–110)
CHOLESTEROL, TOTAL: 101 MG/DL (ref 0–199)
CO2: 27 MMOL/L (ref 21–32)
CREAT SERPL-MCNC: 0.8 MG/DL (ref 0.9–1.3)
EOSINOPHILS ABSOLUTE: 0.2 K/UL (ref 0–0.6)
EOSINOPHILS RELATIVE PERCENT: 1.9 %
GFR AFRICAN AMERICAN: >60
GFR NON-AFRICAN AMERICAN: >60
GLUCOSE BLD-MCNC: 92 MG/DL (ref 70–99)
HCT VFR BLD CALC: 41.8 % (ref 40.5–52.5)
HDLC SERPL-MCNC: 41 MG/DL (ref 40–60)
HEMOGLOBIN: 14.2 G/DL (ref 13.5–17.5)
LDL CHOLESTEROL CALCULATED: 38 MG/DL
LYMPHOCYTES ABSOLUTE: 1.8 K/UL (ref 1–5.1)
LYMPHOCYTES RELATIVE PERCENT: 20.5 %
MCH RBC QN AUTO: 31.4 PG (ref 26–34)
MCHC RBC AUTO-ENTMCNC: 33.9 G/DL (ref 31–36)
MCV RBC AUTO: 92.6 FL (ref 80–100)
MONOCYTES ABSOLUTE: 0.4 K/UL (ref 0–1.3)
MONOCYTES RELATIVE PERCENT: 4.8 %
NEUTROPHILS ABSOLUTE: 6.5 K/UL (ref 1.7–7.7)
NEUTROPHILS RELATIVE PERCENT: 72.3 %
PDW BLD-RTO: 12.4 % (ref 12.4–15.4)
PLATELET # BLD: 194 K/UL (ref 135–450)
PMV BLD AUTO: 10.2 FL (ref 5–10.5)
POTASSIUM SERPL-SCNC: 4.2 MMOL/L (ref 3.5–5.1)
RBC # BLD: 4.52 M/UL (ref 4.2–5.9)
SODIUM BLD-SCNC: 141 MMOL/L (ref 136–145)
TOTAL PROTEIN: 6.7 G/DL (ref 6.4–8.2)
TRIGL SERPL-MCNC: 109 MG/DL (ref 0–150)
VLDLC SERPL CALC-MCNC: 22 MG/DL
WBC # BLD: 9 K/UL (ref 4–11)

## 2021-12-16 PROCEDURE — 90471 IMMUNIZATION ADMIN: CPT | Performed by: NURSE PRACTITIONER

## 2021-12-16 PROCEDURE — 99214 OFFICE O/P EST MOD 30 MIN: CPT | Performed by: NURSE PRACTITIONER

## 2021-12-16 PROCEDURE — 90472 IMMUNIZATION ADMIN EACH ADD: CPT | Performed by: NURSE PRACTITIONER

## 2021-12-16 PROCEDURE — 90746 HEPB VACCINE 3 DOSE ADULT IM: CPT | Performed by: NURSE PRACTITIONER

## 2021-12-16 PROCEDURE — 36415 COLL VENOUS BLD VENIPUNCTURE: CPT | Performed by: NURSE PRACTITIONER

## 2021-12-16 PROCEDURE — 3017F COLORECTAL CA SCREEN DOC REV: CPT | Performed by: NURSE PRACTITIONER

## 2021-12-16 PROCEDURE — 2022F DILAT RTA XM EVC RTNOPTHY: CPT | Performed by: NURSE PRACTITIONER

## 2021-12-16 PROCEDURE — G8926 SPIRO NO PERF OR DOC: HCPCS | Performed by: NURSE PRACTITIONER

## 2021-12-16 PROCEDURE — 1036F TOBACCO NON-USER: CPT | Performed by: NURSE PRACTITIONER

## 2021-12-16 PROCEDURE — 90732 PPSV23 VACC 2 YRS+ SUBQ/IM: CPT | Performed by: NURSE PRACTITIONER

## 2021-12-16 PROCEDURE — 3051F HG A1C>EQUAL 7.0%<8.0%: CPT | Performed by: NURSE PRACTITIONER

## 2021-12-16 PROCEDURE — 90674 CCIIV4 VAC NO PRSV 0.5 ML IM: CPT | Performed by: NURSE PRACTITIONER

## 2021-12-16 PROCEDURE — G8427 DOCREV CUR MEDS BY ELIG CLIN: HCPCS | Performed by: NURSE PRACTITIONER

## 2021-12-16 PROCEDURE — 3023F SPIROM DOC REV: CPT | Performed by: NURSE PRACTITIONER

## 2021-12-16 PROCEDURE — G8482 FLU IMMUNIZE ORDER/ADMIN: HCPCS | Performed by: NURSE PRACTITIONER

## 2021-12-16 PROCEDURE — G8417 CALC BMI ABV UP PARAM F/U: HCPCS | Performed by: NURSE PRACTITIONER

## 2021-12-16 RX ORDER — NICOTINE 21 MG/24HR
1 PATCH, TRANSDERMAL 24 HOURS TRANSDERMAL DAILY
Qty: 42 PATCH | Refills: 0 | Status: SHIPPED | OUTPATIENT
Start: 2021-12-16 | End: 2022-01-27

## 2021-12-16 RX ORDER — NICOTINE 21 MG/24HR
1 PATCH, TRANSDERMAL 24 HOURS TRANSDERMAL DAILY
Qty: 42 PATCH | Refills: 0 | Status: SHIPPED
Start: 2021-12-16 | End: 2022-02-23 | Stop reason: CLARIF

## 2021-12-16 RX ORDER — FENOFIBRATE 160 MG/1
TABLET ORAL
Qty: 30 TABLET | Refills: 2 | Status: SHIPPED | OUTPATIENT
Start: 2021-12-16 | End: 2022-05-19

## 2021-12-16 RX ORDER — GABAPENTIN 400 MG/1
CAPSULE ORAL
Qty: 120 CAPSULE | Refills: 2 | Status: SHIPPED | OUTPATIENT
Start: 2021-12-16 | End: 2022-01-17 | Stop reason: SDUPTHER

## 2021-12-16 ASSESSMENT — ENCOUNTER SYMPTOMS
GASTROINTESTINAL NEGATIVE: 1
EYES NEGATIVE: 1
RESPIRATORY NEGATIVE: 1

## 2021-12-16 NOTE — PROGRESS NOTES
[facility]  Establish care visit   2021    Rossana Jeter (:  1965) is a 64 y.o. male, here to [facility]. Chief Complaint   Patient presents with   Teresa Luevano Establish Care     3 month f/u for diabetes and cholesterol. Pt. is fasting. ASSESSMENT/ PLAN  1. Type 2 diabetes mellitus with other specified complication, with long-term current use of insulin (HCC)  Discussed incorporating some exercises into his daily routine. Patient inquired about gym next-door.  - POCT microalbumin  - HEMOGLOBIN A1C  - CBC WITH AUTO DIFFERENTIAL  - COMPREHENSIVE METABOLIC PANEL    2. Hyperlipidemia, unspecified hyperlipidemia type  - LIPID PANEL  - fenofibrate (TRIGLIDE) 160 MG tablet; TAKE 1 TABLET BY MOUTH EVERY DAY  Dispense: 30 tablet; Refill: 2    3. Chronic obstructive pulmonary disease, unspecified COPD type ([facility] [address]  Attempting to quit smoking  Currently on Symbicort and albuterol. 4. Cigarette nicotine dependence without complication  - nicotine (NICODERM CQ) 21 MG/24HR; Place 1 patch onto the skin daily  Dispense: 42 patch; Refill: 0  - nicotine (NICODERM CQ) 14 MG/24HR; Place 1 patch onto the skin daily  Dispense: 42 patch; Refill: 0  We will review at next visit to determine if this works or if we need to go to other treatment options    5. Need for influenza vaccination  - INFLUENZA, MDCK QUADV, 2 YRS AND OLDER, IM, PF, PREFILL SYR OR SDV, 0.5ML (FLUCELVAX QUADV, PF)    6. Need for pneumococcal vaccination    - PNEUMOVAX 23 subcutaneous/IM (Pneumococcal polysaccharide vaccine 23-valent >= 1yo)    7. Need for hepatitis B vaccination  - Hep B Vaccine Adult (ENGERIX-B)  Discussed patient coming back for his next next dose in about 4 weeks    8. Neuropathy  Increased gabapentin to 2 tabs in the morning.  - gabapentin (NEURONTIN) 400 MG capsule; TAKE 2 CAPSULE DAILY ~201 TAKE 1 CAPSULE DAILY ~533E2 TAKE 2 CAPSULES AT BEDTIME  Dispense: 120 capsule;  Refill: 2       Return in about 3 months (around 3/16/2022). HPI  Patient complains of increased numbness and tingling sensation in his feet. He wants to quit smoking. Currently smokes about a pack of cigarettes a day. Complains of some shortness of breath. Has been trying to lose some weight at home with decreasing the quantity of his meals. Unable to exercise due to the nature of his job. 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 Lives at home with his wife who cooks a lot of meals. ROS  Review of Systems   Constitutional: Negative. HENT: Negative. Eyes: Negative. Respiratory: Negative. Cardiovascular: Negative. Gastrointestinal: Negative. Endocrine: Negative. Genitourinary: Negative. Musculoskeletal: Negative. Skin: Negative. Neurological: Positive for numbness. Increased Numbness and tingling sensation in lower extremities    Hematological: Negative. Psychiatric/Behavioral: Negative.          HISTORIES  Current Outpatient Medications on File Prior to Visit   Medication Sig Dispense Refill    insulin detemir (LEVEMIR FLEXTOUCH) 100 UNIT/ML injection pen INJECT 50 UNITS SUBCUTANEOUSLY NIGHTLY 45 mL 1    atorvastatin (LIPITOR) 40 MG tablet Take 1 tablet by mouth daily 90 tablet 3    omega-3 acid ethyl esters (LOVAZA) 1 g capsule TAKE 2 CAPSULES BY MOUTH TWICE DAILY 120 capsule 3    lisinopril (PRINIVIL;ZESTRIL) 5 MG tablet TAKE 1 TABLET BY MOUTH DAILY 30 tablet 11    pioglitazone (ACTOS) 30 MG tablet Take 1 tablet by mouth daily 90 tablet 2    insulin glargine (LANTUS SOLOSTAR) 100 UNIT/ML injection pen Inject 50 Units into the skin nightly 5 pen 3    UNIFINE PENTIPS PLUS 31G X 5 MM MISC USE FOR INJECTION DAILY 100 each 5    SYMBICORT 80-4.5 MCG/ACT AERO INHALE 2 PUFFS BY MOUTH TWICE DAILY 3 each 3    Easy Touch Lancets 30G/Twist MISC USE TO TEST BLOOD SUGAR ONCE DAILY 100 each 10    TRUE METRIX BLOOD GLUCOSE TEST strip USE TO TEST BLOOD SUGAR ONCE DAILY 100 each 10    albuterol sulfate  (90 Base) MCG/ACT inhaler Inhale 2 puffs into the lungs every 6 hours as needed for Wheezing 18 g 11    tiotropium (SPIRIVA RESPIMAT) 2.5 MCG/ACT AERS inhaler INHALE TWO (2) PUFFS BY MOUTH INTO THE LUNGS DAILY 4 g 10    fluticasone (FLONASE) 50 MCG/ACT nasal spray INSTILL ONE (1) SPRAY IN EACH NOSTRIL DAILY 16 g 10    BASAGLAR KWIKPEN 100 UNIT/ML injection pen INJECT 50 UNITS SUBCUTANEOUSLY NIGHTLY 15 mL 11    TRUEPLUS LANCETS 28G MISC As indicated.  100 each 10    Lancets MISC Test once daily for diabetes e11.9 100 each 3    Blood Glucose Monitoring Suppl (TRUE METRIX METER) w/Device KIT USE DAILY TO CHECK BLOOD SUGAR 1 kit 0    Blood Glucose Monitoring Suppl (TRUE METRIX METER) w/Device KIT USE DAILY TO CHECK BLOOD SUGAR 1 kit 0    ONE TOUCH LANCETS MISC 1 each by Does not apply route daily 100 each 3    Respiratory Therapy Supplies (NEBULIZER COMPRESSOR) KIT 1 kit by Does not apply route daily as needed (use as needed) Use with inhalation solution, DX: J45.41 1 kit 0    metoprolol tartrate (LOPRESSOR) 50 MG tablet TAKE ONE (1) TABLET BY MOUTH TWICE DAILY 60 tablet 5     Current Facility-Administered Medications on File Prior to Visit   Medication Dose Route Frequency Provider Last Rate Last Admin    albuterol (ACCUNEB) nebulizer solution 1.25 mg  1.25 mg Nebulization Once Nely Buenrostro MD        levalbuterol [facility]) nebulization 0.63 mg  0.63 mg Nebulization Once Rosmery Tate MD            Allergies   Allergen Reactions    Ibuprofen Nausea Only    Metformin And Related Diarrhea       Past Medical History:   Diagnosis Date    Asthma     Diabetes mellitus ([facility] [address]     Hyperlipidemia     Hypertension     Lateral epicondylitis of left elbow 8/22/2019    Microalbuminuria     LIANE on CPAP     LIANE treated with BiPAP     LIANE treated with BiPAP     SOB (shortness of breath)     Umbilical hernia        Patient Active Problem List   Diagnosis    Left sided sciatica    Mild episode of recurrent major depressive disorder ([facility])    Chronic leg pain    Essential hypertension    Umbilical hernia    DM type 2, uncontrolled, with neuropathy ([facility])    Cigarette smoker    Mucopurulent chronic bronchitis ([address]    Pure hypercholesterolemia    Back pain    Closed C6 fracture ([facility])    Chronic abdominal pain    Class 3 severe obesity due to excess calories with serious comorbidity and body mass index (BMI) of 40.0 to 44.9 in adult ([facility])    Severe obstructive sleep apnea    Seasonal allergies    Lateral epicondylitis of left elbow    AGE (acute gastroenteritis)    Tendinitis of left rotator cuff    Plantar fasciitis       Past Surgical History:   Procedure Laterality Date    COLONOSCOPY      polyp, hemorroids    HERNIA REPAIR  89    open umbilical hernia repair with mesh    SPLENECTOMY, PARTIAL      repair of \"busted spleen\"    UMBILICAL HERNIA REPAIR         Social History     Socioeconomic History    Marital status:      Spouse name: Not on file    Number of children: Not on file    Years of education: Not on file    Highest education level: Not on file   Occupational History    Occupation: window installation   Tobacco Use    Smoking status: Former Smoker     Packs/day: 0.50     Years: 30.00     Pack years: 15.00     Types: Cigarettes     Quit date: 2021     Years since quittin.8    Smokeless tobacco: Never Used    Tobacco comment: counseled 14   Vaping Use    Vaping Use: Never used   Substance and Sexual Activity    Alcohol use: No    Drug use: No    Sexual activity: Not on file   Other Topics Concern    Not on file   Social History Narrative    Not on file     Social Determinants of Health     Financial Resource Strain: Medium Risk    Difficulty of Paying Living Expenses: Somewhat hard   Food Insecurity: No Food Insecurity    Worried About Running Out of Food in the Last Year: Never true    [place] of Food in the Last Year: Never true   Transportation Needs:     Lack of Transportation (Medical): Not on file    Lack of Transportation (Non-Medical): Not on file   Physical Activity:     Days of Exercise per Week: Not on file    Minutes of Exercise per Session: Not on file   Stress:     Feeling of Stress : Not on file   Social Connections:     Frequency of Communication with Friends and Family: Not on file    Frequency of Social Gatherings with Friends and Family: Not on file    Attends Buddhist Services: Not on file    Active Member of [address] [place] [address] [employer] Education or Organizations: Not on file    Attends Club or Organization Meetings: Not on file    Marital Status: Not on file   Intimate Partner Violence:     Fear of Current or Ex-Partner: Not on file    Emotionally Abused: Not on file    Physically Abused: Not on file    Sexually Abused: Not on file   Housing Stability:     Unable to Pay for Housing in the Last Year: Not on file    Number of [place] in the Last Year: Not on file    Unstable Housing in the Last Year: Not on file        Family History   Problem Relation Age of Onset    Diabetes Mother     Diabetes Father        PE  Vitals:    12/16/21 0757   BP: 122/70   Site: Right Upper Arm   Position: Sitting   Pulse: 67   SpO2: 97%   Weight: 243 lb 6.4 oz (110.4 kg)   Height: 5' 3\" (1.6 m)     Estimated body mass index is 43.12 kg/m² as calculated from the following:    Height as of this encounter: 5' 3\" (1.6 m). Weight as of this encounter: 243 lb 6.4 oz (110.4 kg). Physical Exam  HENT:      Right Ear: Tympanic membrane and ear canal normal.      Left Ear: Tympanic membrane and ear canal normal.      Nose: Nose normal.      Mouth/Throat:      Mouth: Mucous membranes are moist.   Eyes:      Extraocular Movements: Extraocular movements intact. Cardiovascular:      Rate and Rhythm: Normal rate. Pulses: Normal pulses. Heart sounds: Normal heart sounds.    Pulmonary:      Effort: Pulmonary effort is normal. Breath sounds: Normal breath sounds. Abdominal:      General: Bowel sounds are normal.      Palpations: Abdomen is soft. Musculoskeletal:         General: Normal range of motion. Cervical back: Normal range of motion. Skin:     General: Skin is warm. Neurological:      General: No focal deficit present. Mental Status: He is alert and oriented to person, place, and time. Psychiatric:         Mood and Affect: Mood normal.         Behavior: Behavior normal.         Thought Content:  Thought content normal.         Judgment: Judgment normal.         Immunization History   Administered Date(s) Administered    COVID-19, Pfizer, PF, 30mcg/0.3mL 03/22/2021, 04/13/2021    Influenza 11/04/2013    Influenza Vaccine, unspecified formulation 11/04/2013    Influenza Virus Vaccine 11/04/2013, 08/21/2015    Influenza Whole 08/21/2015    Influenza, Scot Spark, IM, PF (6 mo and older Fluzone, Flulaval, Fluarix, and 3 yrs and older Afluria) 09/06/2016, 01/16/2018, 10/18/2018, 02/19/2021    Influenza, Scot Spark, Recombinant, IM PF (Flublok 18 yrs and older) 12/12/2019    Pneumococcal Polysaccharide (Cmhoblqzo23) 07/02/2018    Tdap (Boostrix, Adacel) 11/16/2011, 08/31/2021    Zoster Live (Zostavax) 08/21/2015    Zoster Recombinant (Shingrix) 12/27/2019, 06/30/2020       Health Maintenance   Topic Date Due    Meningococcal (ACWY) vaccine (1 - Risk start 2-23 months series) Never done    Hib vaccine (1 of 1 - Risk 1-dose series) Never done    Meningococcal B vaccine (1 of 4 - Increased Risk Bexsero 2-dose series) Never done    Hepatitis B vaccine (1 of 3 - Risk 3-dose series) Never done    Diabetic microalbuminuria test  07/02/2019    Pneumococcal 0-64 years Vaccine (2 of 4 - PCV13) 07/02/2019    Diabetic retinal exam  09/19/2020    Flu vaccine (1) 09/01/2021    COVID-19 Vaccine (3 - Booster for Pfizer series) 10/13/2021    Diabetic foot exam  05/25/2022    A1C test (Diabetic or Prediabetic) 05/25/2022    Lipid screen  05/25/2022    Potassium monitoring  05/25/2022    Creatinine monitoring  05/25/2022    Colon cancer screen colonoscopy  06/08/2027    DTaP/Tdap/Td vaccine (3 - Td or Tdap) 08/31/2031    Shingles Vaccine  Completed    Hepatitis C screen  Completed    HIV screen  Completed    Hepatitis A vaccine  Aged Out       PSH, PMH, SH and FH reviewed and noted. Recent and past labs, tests and consults also reviewed. Recent or new meds also reviewed. Marisela Valladares, APRN - CNP    This dictation was generated by voice recognition computer software. Although all attempts are made to edit the dictation for accuracy, there may be errors in the transcription that are not intended.

## 2021-12-16 NOTE — PROGRESS NOTES
Vaccine Information Sheet, \"Influenza - Inactivated\"  given to Berenice Sanford, or parent/legal guardian of  Berenice Sanford and verbalized understanding. Patient responses:    Have you ever had a reaction to a flu vaccine? No  Do you have any current illness? No  Have you ever had [place] Syndrome? No  Do you have a serious allergy to any of the follow: Neomycin, Polymyxin, Thimerosal, eggs or egg products? No    Flu vaccine given per order. Please see immunization tab. Risks and benefits explained. Current VIS given.

## 2021-12-17 ENCOUNTER — TELEPHONE (OUTPATIENT)
Dept: FAMILY MEDICINE CLINIC | Age: 56
End: 2021-12-17

## 2021-12-17 LAB
ESTIMATED AVERAGE GLUCOSE: 168.6 MG/DL
HBA1C MFR BLD: 7.5 %

## 2021-12-17 RX ORDER — FLUTICASONE PROPIONATE 50 MCG
SPRAY, SUSPENSION (ML) NASAL
Qty: 16 G | Refills: 10 | Status: SHIPPED | OUTPATIENT
Start: 2021-12-17

## 2021-12-20 ENCOUNTER — TELEPHONE (OUTPATIENT)
Dept: FAMILY MEDICINE CLINIC | Age: 56
End: 2021-12-20

## 2021-12-27 ENCOUNTER — TELEPHONE (OUTPATIENT)
Dept: FAMILY MEDICINE CLINIC | Age: 56
End: 2021-12-27

## 2022-01-17 ENCOUNTER — TELEPHONE (OUTPATIENT)
Dept: FAMILY MEDICINE CLINIC | Age: 57
End: 2022-01-17

## 2022-01-17 DIAGNOSIS — G62.9 NEUROPATHY: ICD-10-CM

## 2022-01-17 RX ORDER — GABAPENTIN 400 MG/1
CAPSULE ORAL
Qty: 150 CAPSULE | Refills: 2 | Status: CANCELLED | OUTPATIENT
Start: 2022-01-17 | End: 2023-01-17

## 2022-01-17 RX ORDER — GABAPENTIN 400 MG/1
CAPSULE ORAL
Qty: 120 CAPSULE | Refills: 2 | Status: SHIPPED | OUTPATIENT
Start: 2022-01-17 | End: 2022-02-11 | Stop reason: SDUPTHER

## 2022-01-17 NOTE — TELEPHONE ENCOUNTER
----- Message from [place] sent at 1/17/2022 11:20 AM EST -----  Subject: Medication Problem    QUESTIONS  Name of Medication? gabapentin (NEURONTIN) 400 MG capsule  Patient-reported dosage and instructions? 400 MG 2 in morning, 1 at lunch,   2 at night  What question or problem do you have with the medication? He just received   his medication and he only received enough for 24 days and he is wondering   why it was bottled and not in a package. He was normally getting enough   medication for 30 days. Preferred Pharmacy? [address] [facility]  Pharmacy phone number (if available)? [phone]  Additional Information for Provider? Pt needs a prescription for 30 days. He will not have enough to last him with just 24 days. ---------------------------------------------------------------------------  --------------  Sherin Murphy INFO  What is the best way for the office to contact you? Do not leave any   message, patient will call back for answer  Preferred Call Back Phone Number? [phone]  ---------------------------------------------------------------------------  --------------  SCRIPT ANSWERS  Relationship to Patient?  Self

## 2022-01-19 NOTE — TELEPHONE ENCOUNTER
Last Office Visit  -  12/16/21 Zee Noguera  Next Office Visit  -  3/17/22    Last Filled  -    Last UDS -    Contract -

## 2022-01-20 RX ORDER — DICLOFENAC SODIUM 75 MG/1
TABLET, DELAYED RELEASE ORAL
Qty: 30 TABLET | Refills: 2 | Status: SHIPPED | OUTPATIENT
Start: 2022-01-20 | End: 2022-05-20

## 2022-02-11 DIAGNOSIS — G62.9 NEUROPATHY: ICD-10-CM

## 2022-02-11 RX ORDER — GABAPENTIN 400 MG/1
CAPSULE ORAL
Qty: 120 CAPSULE | Refills: 2 | Status: SHIPPED | OUTPATIENT
Start: 2022-02-11 | End: 2022-02-25 | Stop reason: SDUPTHER

## 2022-02-11 NOTE — TELEPHONE ENCOUNTER
On 01/17/2022, patient was prescribed gabapentin 400mg capsule, dispensed 120 with 2 refills, patient states that he did not receive enough pills for the month again  Please advise

## 2022-02-11 NOTE — TELEPHONE ENCOUNTER
Patient advised, he states that the script needs to be refilled for 150 because they come in packages, he is requesting a script for 150 sent to the pharmacy, he will be short this month and will need [facility]

## 2022-02-11 NOTE — TELEPHONE ENCOUNTER
----- Message from Tali Pickard sent at 2/11/2022 12:49 PM EST -----  Subject: Medication Problem    QUESTIONS  Name of Medication? gabapentin (NEURONTIN) 400 MG capsule  Patient-reported dosage and instructions? 5 per day  What question or problem do you have with the medication? Pt said he   didn't receive enough pills for the month again  150 is what he is   supposed to get  Preferred Pharmacy? [facility], [address] [place] [address]  phone number (if available)? [phone]  Additional Information for Provider?   ---------------------------------------------------------------------------  --------------  [address]  What is the best way for the office to contact you? Do not leave any   message, patient will call back for answer  Preferred Call Back Phone Number? [phone]  ---------------------------------------------------------------------------  --------------  SCRIPT ANSWERS  Relationship to Patient?  Self

## 2022-02-11 NOTE — TELEPHONE ENCOUNTER
Please advise patient that medication refilled to [facility] pharmacy thank you negative

## 2022-02-18 ENCOUNTER — TELEPHONE (OUTPATIENT)
Dept: FAMILY MEDICINE CLINIC | Age: 57
End: 2022-02-18

## 2022-02-18 DIAGNOSIS — G62.9 NEUROPATHY: ICD-10-CM

## 2022-02-18 NOTE — TELEPHONE ENCOUNTER
Called and spoke to patients [address]  P [phone] -   they stated he needs a refill of spiriva respimat . also, patient states the gabapentin is suppose to be written for 150 however per chart review it was wrote for 120 capsules.  please advise

## 2022-02-18 NOTE — TELEPHONE ENCOUNTER
Patient called, the pharmacy shorted him on the gabapentin (NEURONTIN) 400 MG capsule [[id number]]  States he is supposed to get 150, not 120 as prescribed. He also stated the pharmacy advised he was due for a refill but he was unsure of what that refill was for. He is requesting we call the pharmacy for this as well as let them know that Sujey is his new PCP and no longer Dr. Arianna Guzman.

## 2022-02-18 NOTE — TELEPHONE ENCOUNTER
Please advice patient I sent the spiriva, I am a little confused about the gabapentin I will call on Monday to the [facility] to verify as I keep sending new script and I am worried of him taking too much gabapentin thanks

## 2022-02-23 ENCOUNTER — TELEPHONE (OUTPATIENT)
Dept: PULMONOLOGY | Age: 57
End: 2022-02-23

## 2022-02-23 ENCOUNTER — SCHEDULED TELEPHONE ENCOUNTER (OUTPATIENT)
Dept: SLEEP MEDICINE | Age: 57
End: 2022-02-23
Payer: COMMERCIAL

## 2022-02-23 DIAGNOSIS — E66.01 OBESITY, MORBID, BMI 40.0-49.9 (HCC): ICD-10-CM

## 2022-02-23 DIAGNOSIS — G47.33 SEVERE OBSTRUCTIVE SLEEP APNEA: Primary | ICD-10-CM

## 2022-02-23 DIAGNOSIS — R53.83 OTHER FATIGUE: ICD-10-CM

## 2022-02-23 DIAGNOSIS — Z71.89 ENCOUNTER FOR BIPAP USE COUNSELING: ICD-10-CM

## 2022-02-23 PROCEDURE — 99442 PR PHYS/QHP TELEPHONE EVALUATION 11-20 MIN: CPT | Performed by: NURSE PRACTITIONER

## 2022-02-23 ASSESSMENT — SLEEP AND FATIGUE QUESTIONNAIRES
HOW LIKELY ARE YOU TO NOD OFF OR FALL ASLEEP WHILE LYING DOWN TO REST IN THE AFTERNOON WHEN CIRCUMSTANCES PERMIT: 1
HOW LIKELY ARE YOU TO NOD OFF OR FALL ASLEEP IN A CAR, WHILE STOPPED FOR A FEW MINUTES IN TRAFFIC: 0
HOW LIKELY ARE YOU TO NOD OFF OR FALL ASLEEP WHEN YOU ARE A PASSENGER IN A CAR FOR AN HOUR WITHOUT A BREAK: 1
HOW LIKELY ARE YOU TO NOD OFF OR FALL ASLEEP WHILE WATCHING TV: 0
HOW LIKELY ARE YOU TO NOD OFF OR FALL ASLEEP WHILE SITTING QUIETLY AFTER LUNCH WITHOUT ALCOHOL: 2
HOW LIKELY ARE YOU TO NOD OFF OR FALL ASLEEP WHILE SITTING INACTIVE IN A PUBLIC PLACE: 0
HOW LIKELY ARE YOU TO NOD OFF OR FALL ASLEEP WHILE SITTING AND READING: 0
ESS TOTAL SCORE: 4
HOW LIKELY ARE YOU TO NOD OFF OR FALL ASLEEP WHILE SITTING AND TALKING TO SOMEONE: 0

## 2022-02-23 NOTE — TELEPHONE ENCOUNTER

## 2022-02-23 NOTE — TELEPHONE ENCOUNTER

## 2022-02-23 NOTE — TELEPHONE ENCOUNTER
Patient ID: Ilsa Waters is a 62 y.o. male who is being seen today for   Chief Complaint   Patient presents with    Sleep Apnea     1 year fu     Self referral    HPI:     Ilsa Waters is a 62 y.o. male for telephone virtual visit for LIANE follow up. States he is doing well with BIPAP. States BiPAP is making a whirling noise when breathing and it at night, states it seems to be functioning okay otherwise. Patient is using BiPAP   8-9 hrs/night. Using humidifier. No snoring on BiPAP. The pressure is well tolerated. The mask is comfortable- full face. No mask leak. No significant daytime sleepiness. Denies nodding off when driving. No dry nose or throat. some fatigue. Bedtime is 630-7 pm and rise time is 4 am. Sleep onset is few minutes. Wakes up 1-2 times at night total. 1-2 nocturia. It takes few minutes to fall back a sleep. Occasional naps during the day. No headache in am. No weight gain. No caffienated beverages during the day. No alcohol.  ESS is 4      Sleep Medicine 2/23/2021 9/25/2020 2/19/2020 2/19/2020 2/13/2019 2/13/2018 12/7/2017   Sitting and reading 0 2 0 0 0 0 0   Watching TV 1 1 0 0 0 0 2   Sitting, inactive in a public place (e.g. a theatre or a meeting) 0 0 0 0 0 0 0   As a passenger in a car for an hour without a break 1 0 0 0 0 0 0   Lying down to rest in the afternoon when circumstances permit 1 2 3 3 2 0 0   Sitting and talking to someone 0 0 0 0 0 0 0   Sitting quietly after a lunch without alcohol 0 0 1 1 0 0 0   In a car, while stopped for a few minutes in traffic 0 1 0 3 0 0 0   Total score 3 6 4 7 2 0 2   Neck circumference (Inches) - - - 19.5 19.5 19.5 18.25       Past Medical History:  Past Medical History:   Diagnosis Date    Asthma     Diabetes mellitus ([facility] [address]     Hyperlipidemia     Hypertension     Lateral epicondylitis of left elbow 8/22/2019    Microalbuminuria     LIANE on CPAP     LIANE treated with BiPAP     LIANE treated with BiPAP     SOB (shortness of breath)  Umbilical hernia        Past Surgical History:        Procedure Laterality Date    COLONOSCOPY      polyp, hemorroids    HERNIA REPAIR  40/72/78    open umbilical hernia repair with mesh    SPLENECTOMY, PARTIAL      repair of \"busted spleen\"    UMBILICAL HERNIA REPAIR         Allergies:  is allergic to ibuprofen and metformin and related. Social History:    TOBACCO:   reports that he quit smoking about 13 months ago. His smoking use included cigarettes. He has a 15.00 pack-year smoking history. He has never used smokeless tobacco.  ETOH:   reports no history of alcohol use.     Family History:       Problem Relation Age of Onset    Diabetes Mother     Diabetes Father        Current Medications:    Current Outpatient Medications:     tiotropium (SPIRIVA RESPIMAT) 2.5 MCG/ACT AERS inhaler, INHALE TWO (2) PUFFS BY MOUTH INTO THE LUNGS DAILY, Disp: 4 g, Rfl: 10    gabapentin (NEURONTIN) 400 MG capsule, TAKE 2 CAPSULE DAILY ~201 TAKE 1 CAPSULE lunch and  ~108Q2 TAKE 2 CAPSULES AT BEDTIME, Disp: 120 capsule, Rfl: 2    diclofenac (VOLTAREN) 75 MG EC tablet, TAKE 1 TABLET BY MOUTH TWICE DAILY WITH FOOD, Disp: 30 tablet, Rfl: 2    fluticasone (FLONASE) 50 MCG/ACT nasal spray, USE 1 SPRAY IN EACH NOSTRIL DAILY, Disp: 16 g, Rfl: 10    fenofibrate (TRIGLIDE) 160 MG tablet, TAKE 1 TABLET BY MOUTH EVERY DAY, Disp: 30 tablet, Rfl: 2    insulin detemir (LEVEMIR FLEXTOUCH) 100 UNIT/ML injection pen, INJECT 50 UNITS SUBCUTANEOUSLY NIGHTLY, Disp: 45 mL, Rfl: 1    atorvastatin (LIPITOR) 40 MG tablet, Take 1 tablet by mouth daily, Disp: 90 tablet, Rfl: 3    metoprolol tartrate (LOPRESSOR) 50 MG tablet, TAKE ONE (1) TABLET BY MOUTH TWICE DAILY, Disp: 60 tablet, Rfl: 5    omega-3 acid ethyl esters (LOVAZA) 1 g capsule, TAKE 2 CAPSULES BY MOUTH TWICE DAILY, Disp: 120 capsule, Rfl: 3    lisinopril (PRINIVIL;ZESTRIL) 5 MG tablet, TAKE 1 TABLET BY MOUTH DAILY, Disp: 30 tablet, Rfl: 11    insulin glargine (LANTUS SOLOSTAR) 100 UNIT/ML injection pen, Inject 50 Units into the skin nightly, Disp: 5 pen, Rfl: 3    UNIFINE PENTIPS PLUS 31G X 5 MM MISC, USE FOR INJECTION DAILY, Disp: 100 each, Rfl: 5    SYMBICORT 80-4.5 MCG/ACT AERO, INHALE 2 PUFFS BY MOUTH TWICE DAILY, Disp: 3 each, Rfl: 3    Easy Touch Lancets 30G/Twist MISC, USE TO TEST BLOOD SUGAR ONCE DAILY, Disp: 100 each, Rfl: 10    TRUE METRIX BLOOD GLUCOSE TEST strip, USE TO TEST BLOOD SUGAR ONCE DAILY, Disp: 100 each, Rfl: 10    albuterol sulfate  (90 Base) MCG/ACT inhaler, Inhale 2 puffs into the lungs every 6 hours as needed for Wheezing, Disp: 18 g, Rfl: 11    BASAGLAR KWIKPEN 100 UNIT/ML injection pen, INJECT 50 UNITS SUBCUTANEOUSLY NIGHTLY, Disp: 15 mL, Rfl: 11    TRUEPLUS LANCETS 28G MISC, As indicated. , Disp: 100 each, Rfl: 10    Lancets MISC, Test once daily for diabetes e11.9, Disp: 100 each, Rfl: 3    Blood Glucose Monitoring Suppl (TRUE METRIX METER) w/Device KIT, USE DAILY TO CHECK BLOOD SUGAR, Disp: 1 kit, Rfl: 0    Blood Glucose Monitoring Suppl (TRUE METRIX METER) w/Device KIT, USE DAILY TO CHECK BLOOD SUGAR, Disp: 1 kit, Rfl: 0    ONE TOUCH LANCETS MISC, 1 each by Does not apply route daily, Disp: 100 each, Rfl: 3    Respiratory Therapy Supplies (NEBULIZER COMPRESSOR) KIT, 1 kit by Does not apply route daily as needed (use as needed) Use with inhalation solution, DX: J45.41, Disp: 1 kit, Rfl: 0    nicotine (NICODERM CQ) 14 MG/24HR, Place 1 patch onto the skin daily, Disp: 42 patch, Rfl: 0    pioglitazone (ACTOS) 30 MG tablet, Take 1 tablet by mouth daily, Disp: 90 tablet, Rfl: 2      Review of Systems    Objective:   PHYSICAL EXAM:    There were no vitals taken for this visit.     Physical Exam        DATA:   10/25/17 split-night sleep study AHI 86.3, low SPO2 56%, improved sleep-related breathing with BiPAP, recommendations BiPAP 21/16 cm H2O    BIPAP compliance data:  Compliance download report from 1/14/18 to 2/12/18  showed patient is using machine 6:32 hrs/night with 93% compliance and AHI 0.5 within this time frame. 28/30days with greater than 4 hours of machine use. BIPAP 21/16 cm H20    Compliance download report from 1/14/19 to 2/12/19 showed patient is using machine 8:33 hrs/night with 100% compliance and AHI 0.2 within this time frame. 30/30days with greater than 4 hours of machine use. BIPAP 20/15 cm H20    Compliance download report from 1/20/20 to 2/18/20 showed patient is using machine 8:05 hrs/night with 100% compliance and AHI 0.1 within this time frame. 30/30days with greater than 4 hours of machine use. BIPAP 20/15 cm H20    Compliance download report from 8/26/20 to 9/24/20 reviewed today by me and showed patient is using machine 8:39 hrs/night with 100% compliance and AHI 0.2 within this time frame. 30/30days with greater than 4 hours of machine use. BIPAP 20/15 cm H20      Compliance download report from 1/23/21 to 2/21/21 reviewed today by me and showed patient is using machine 8:40 hrs/night with 100% compliance and AHI 0.2 within this time frame. 30/30days with greater than 4 hours of machine use. BIPAP 20/15 cm H20     Compliance download report from 1/24/22 to 2/22/22 reviewed today by me and showed patient is using machine 8:29 hrs/night with 100% compliance and AHI 0.2 within this time frame. 30/30days with greater than 4 hours of machine use. BIPAP 20/15 cm H20     Assessment:       · Severe LIANE. BiPAP 20/15 cm H2O. Optimal compliance and efficacy on review today. · Snoring- resolved on BIPAP  · Witnessed apnea -resolved on BIPAP  · Hypersomnia-resolved with BiPAP use. · Asthma, DM, HTN- followed by PCP  · Obesity        Plan:       · Continue BIPAP 20/15 cm H2O  · Will send order for supplies to DME. · Order for DME to check patient's BiPAP functionality. Can order new machine if needed.   Patient will let us know  · Discussed severity of sleep apnea and importance of BiPAP use  · Advised to use BiPAP 6-8 hrs at night and during naps. · Replacement of mask, tubing, head straps every 3-6 months or sooner if damaged. · Patient instructed to contact DME company for any mask, tubing or machine trouble shooting if problems arise. · Sleep hygiene  · Avoid sedatives, alcohol and caffeinated drinks at bed time. · No driving motorized vehicles or operating heavy machinery while fatigue, drowsy or sleepy. - patient verbalized understanding and agrees. · Weight loss is also recommended as a long-term intervention. · Complications of LIANE if not treated were discussed with patient patient to include systemic hypertension, pulmonary hypertension, cardiovascular morbidities, car accidents and all cause mortality.   · Patient education  regarding sleep tips and PAP cleaning recommendations       Follow up: as scheduled, sooner if needed

## 2022-02-25 DIAGNOSIS — G62.9 NEUROPATHY: ICD-10-CM

## 2022-02-25 RX ORDER — GABAPENTIN 400 MG/1
CAPSULE ORAL
Qty: 150 CAPSULE | Refills: 2 | Status: SHIPPED | OUTPATIENT
Start: 2022-02-25 | End: 2022-08-31

## 2022-02-25 NOTE — TELEPHONE ENCOUNTER
Pt is calling asking if we got his script corrected he takes 5 a day. And only getting 120.  He Would like to know if this has been taken care of please advise

## 2022-03-03 NOTE — TELEPHONE ENCOUNTER
Called patient, no answer and VM not set up    Please advise as office has attempted to reach patient multiple times.

## 2022-03-04 ENCOUNTER — TELEPHONE (OUTPATIENT)
Dept: FAMILY MEDICINE CLINIC | Age: 57
End: 2022-03-04

## 2022-03-04 NOTE — TELEPHONE ENCOUNTER
Received a fax from [facility] for fenofibrate 160mg tablets  Key: [id number]  Scanned to this encounter

## 2022-03-07 NOTE — TELEPHONE ENCOUNTER
Submitted PA for Fenofibrate 160MG tablets, Key: [id number]. Medication has been APPROVED through 03/07/2023. Please notify patient. Thank you.

## 2022-03-18 ENCOUNTER — OFFICE VISIT (OUTPATIENT)
Dept: FAMILY MEDICINE CLINIC | Age: 57
End: 2022-03-18
Payer: COMMERCIAL

## 2022-03-18 VITALS
SYSTOLIC BLOOD PRESSURE: 110 MMHG | DIASTOLIC BLOOD PRESSURE: 74 MMHG | WEIGHT: 247.6 LBS | OXYGEN SATURATION: 98 % | HEIGHT: 67 IN | BODY MASS INDEX: 38.86 KG/M2 | HEART RATE: 67 BPM

## 2022-03-18 DIAGNOSIS — E11.69 TYPE 2 DIABETES MELLITUS WITH OTHER SPECIFIED COMPLICATION, WITH LONG-TERM CURRENT USE OF INSULIN (HCC): ICD-10-CM

## 2022-03-18 DIAGNOSIS — J44.9 CHRONIC OBSTRUCTIVE PULMONARY DISEASE, UNSPECIFIED COPD TYPE (HCC): ICD-10-CM

## 2022-03-18 DIAGNOSIS — Z23 NEED FOR HEPATITIS B BOOSTER VACCINATION: Primary | ICD-10-CM

## 2022-03-18 DIAGNOSIS — E78.5 HYPERLIPIDEMIA, UNSPECIFIED HYPERLIPIDEMIA TYPE: ICD-10-CM

## 2022-03-18 DIAGNOSIS — Z79.4 TYPE 2 DIABETES MELLITUS WITH OTHER SPECIFIED COMPLICATION, WITH LONG-TERM CURRENT USE OF INSULIN (HCC): ICD-10-CM

## 2022-03-18 LAB
CHOLESTEROL, TOTAL: 107 MG/DL (ref 0–199)
CREATININE URINE POCT: 300
HBA1C MFR BLD: 6.3 %
HDLC SERPL-MCNC: 49 MG/DL (ref 40–60)
LDL CHOLESTEROL CALCULATED: 40 MG/DL
MICROALBUMIN/CREAT 24H UR: 30 MG/G{CREAT}
MICROALBUMIN/CREAT UR-RTO: <30
TRIGL SERPL-MCNC: 90 MG/DL (ref 0–150)
VLDLC SERPL CALC-MCNC: 18 MG/DL

## 2022-03-18 PROCEDURE — 82044 UR ALBUMIN SEMIQUANTITATIVE: CPT | Performed by: NURSE PRACTITIONER

## 2022-03-18 PROCEDURE — 3023F SPIROM DOC REV: CPT | Performed by: NURSE PRACTITIONER

## 2022-03-18 PROCEDURE — 3017F COLORECTAL CA SCREEN DOC REV: CPT | Performed by: NURSE PRACTITIONER

## 2022-03-18 PROCEDURE — 99213 OFFICE O/P EST LOW 20 MIN: CPT | Performed by: NURSE PRACTITIONER

## 2022-03-18 PROCEDURE — 36415 COLL VENOUS BLD VENIPUNCTURE: CPT | Performed by: NURSE PRACTITIONER

## 2022-03-18 PROCEDURE — 90471 IMMUNIZATION ADMIN: CPT | Performed by: NURSE PRACTITIONER

## 2022-03-18 PROCEDURE — G8482 FLU IMMUNIZE ORDER/ADMIN: HCPCS | Performed by: NURSE PRACTITIONER

## 2022-03-18 PROCEDURE — 90746 HEPB VACCINE 3 DOSE ADULT IM: CPT | Performed by: NURSE PRACTITIONER

## 2022-03-18 PROCEDURE — 2022F DILAT RTA XM EVC RTNOPTHY: CPT | Performed by: NURSE PRACTITIONER

## 2022-03-18 PROCEDURE — G8427 DOCREV CUR MEDS BY ELIG CLIN: HCPCS | Performed by: NURSE PRACTITIONER

## 2022-03-18 PROCEDURE — 83036 HEMOGLOBIN GLYCOSYLATED A1C: CPT | Performed by: NURSE PRACTITIONER

## 2022-03-18 PROCEDURE — G8417 CALC BMI ABV UP PARAM F/U: HCPCS | Performed by: NURSE PRACTITIONER

## 2022-03-18 PROCEDURE — 1036F TOBACCO NON-USER: CPT | Performed by: NURSE PRACTITIONER

## 2022-03-18 PROCEDURE — 3044F HG A1C LEVEL LT 7.0%: CPT | Performed by: NURSE PRACTITIONER

## 2022-03-18 RX ORDER — VARENICLINE TARTRATE 1 MG/1
1 TABLET, FILM COATED ORAL 2 TIMES DAILY
Qty: 180 TABLET | Refills: 1 | Status: SHIPPED | OUTPATIENT
Start: 2022-03-18

## 2022-03-18 ASSESSMENT — PATIENT HEALTH QUESTIONNAIRE - PHQ9
1. LITTLE INTEREST OR PLEASURE IN DOING THINGS: 0
10. IF YOU CHECKED OFF ANY PROBLEMS, HOW DIFFICULT HAVE THESE PROBLEMS MADE IT FOR YOU TO DO YOUR WORK, TAKE CARE OF THINGS AT HOME, OR GET ALONG WITH OTHER PEOPLE: 0
4. FEELING TIRED OR HAVING LITTLE ENERGY: 1
8. MOVING OR SPEAKING SO SLOWLY THAT OTHER PEOPLE COULD HAVE NOTICED. OR THE OPPOSITE, BEING SO FIGETY OR RESTLESS THAT YOU HAVE BEEN MOVING AROUND A LOT MORE THAN USUAL: 0
5. POOR APPETITE OR OVEREATING: 0
SUM OF ALL RESPONSES TO PHQ QUESTIONS 1-9: 5
3. TROUBLE FALLING OR STAYING ASLEEP: 3
7. TROUBLE CONCENTRATING ON THINGS, SUCH AS READING THE NEWSPAPER OR WATCHING TELEVISION: 0
6. FEELING BAD ABOUT YOURSELF - OR THAT YOU ARE A FAILURE OR HAVE LET YOURSELF OR YOUR FAMILY DOWN: 1
SUM OF ALL RESPONSES TO PHQ QUESTIONS 1-9: 5
2. FEELING DOWN, DEPRESSED OR HOPELESS: 0
SUM OF ALL RESPONSES TO PHQ QUESTIONS 1-9: 5
9. THOUGHTS THAT YOU WOULD BE BETTER OFF DEAD, OR OF HURTING YOURSELF: 0
SUM OF ALL RESPONSES TO PHQ QUESTIONS 1-9: 5
SUM OF ALL RESPONSES TO PHQ9 QUESTIONS 1 & 2: 0

## 2022-03-18 ASSESSMENT — ENCOUNTER SYMPTOMS
RESPIRATORY NEGATIVE: 1
EYES NEGATIVE: 1
GASTROINTESTINAL NEGATIVE: 1

## 2022-03-18 NOTE — PROGRESS NOTES
Werner Osborne (:  1965) is a 62 y.o. male,Established patient, here for evaluation of the following chief complaint(s):  3 Month Follow-Up (type 2 diabetic , pt is fasting)         ASSESSMENT/PLAN:  1. Need for hepatitis B booster vaccination  -     Hep B Vaccine Adult (ENGERIX-B)  2. Chronic obstructive pulmonary disease, unspecified COPD type ([address]  -     varenicline (CHANTIX) 1 MG tablet; Take 1 tablet by mouth 2 times daily, Disp-180 tablet, R-1Normal  3. Type 2 diabetes mellitus with other specified complication, with long-term current use of insulin (HCC)  -     POCT microalbumin  4. Hyperlipidemia, unspecified hyperlipidemia type  -     LIPID PANEL      No follow-ups on file. Subjective   SUBJECTIVE/OBJECTIVE:  HPI  Patient is here to f/u for diabetes and hyperlipidemia; he stated he would like to have someone teach him how to cook diabetic food to help with weight loss; he would like to try to quit smoking and is willing to try chantix; Review of Systems   Constitutional: Negative. HENT: Negative. Eyes: Negative. Respiratory: Negative. Cardiovascular: Negative. Gastrointestinal: Negative. Endocrine: Negative. Genitourinary: Negative. Musculoskeletal: Negative. Skin: Negative. Neurological: Negative. Neuropathy in feet    Hematological: Negative. Psychiatric/Behavioral: Negative. Objective   Physical Exam  HENT:      Right Ear: Tympanic membrane and ear canal normal.      Left Ear: Tympanic membrane and ear canal normal.      Nose: Nose normal.      Mouth/Throat:      Mouth: Mucous membranes are moist.   Eyes:      Extraocular Movements: Extraocular movements intact. Cardiovascular:      Rate and Rhythm: Normal rate. Pulses: Normal pulses. Heart sounds: Normal heart sounds. Pulmonary:      Effort: Pulmonary effort is normal.      Breath sounds: Normal breath sounds.    Abdominal:      General: Bowel sounds are normal. Palpations: Abdomen is soft. Musculoskeletal:         General: Normal range of motion. Cervical back: Normal range of motion. Skin:     General: Skin is warm. Neurological:      General: No focal deficit present. Mental Status: He is alert and oriented to person, place, and time. Psychiatric:         Mood and Affect: Mood normal.         Behavior: Behavior normal.         Thought Content: Thought content normal.         Judgment: Judgment normal.            On this date 3/18/2022 I have spent  minutes reviewing previous notes, test results and face to face with the patient discussing the diagnosis and importance of compliance with the treatment plan as well as documenting on the day of the visit. An electronic signature was used to authenticate this note.     --NICK Slade - CNP

## 2022-04-22 RX ORDER — PEN NEEDLE, DIABETIC 31 GX3/16"
NEEDLE, DISPOSABLE MISCELLANEOUS
Qty: 100 EACH | Refills: 10 | Status: SHIPPED | OUTPATIENT
Start: 2022-04-22

## 2022-04-22 RX ORDER — OMEGA-3-ACID ETHYL ESTERS 1 G/1
CAPSULE, LIQUID FILLED ORAL
Qty: 120 CAPSULE | Refills: 10 | Status: SHIPPED | OUTPATIENT
Start: 2022-04-22

## 2022-04-22 NOTE — TELEPHONE ENCOUNTER
Kel Mehdi is requesting refill(s)   Last OV 03/18/2022 (pertaining to medication)  LR 11/17/2021 (per medication requested)  Next office visit scheduled or attempted 06/17/2022

## 2022-05-17 RX ORDER — LISINOPRIL 5 MG/1
TABLET ORAL
Qty: 30 TABLET | Refills: 3 | Status: SHIPPED | OUTPATIENT
Start: 2022-05-17 | End: 2022-08-31

## 2022-05-17 RX ORDER — LISINOPRIL 5 MG/1
TABLET ORAL
Qty: 30 TABLET | Refills: 10 | OUTPATIENT
Start: 2022-05-17

## 2022-05-17 NOTE — TELEPHONE ENCOUNTER
It looks like it went to [facility] first and was refused there and then sent to you, loaded and pended medication

## 2022-05-19 DIAGNOSIS — E78.5 HYPERLIPIDEMIA, UNSPECIFIED HYPERLIPIDEMIA TYPE: ICD-10-CM

## 2022-05-19 RX ORDER — INSULIN DETEMIR 100 [IU]/ML
INJECTION, SOLUTION SUBCUTANEOUS
Qty: 15 ML | Refills: 10 | Status: SHIPPED | OUTPATIENT
Start: 2022-05-19

## 2022-05-19 RX ORDER — PIOGLITAZONEHYDROCHLORIDE 30 MG/1
30 TABLET ORAL DAILY
Qty: 90 TABLET | Refills: 2 | Status: SHIPPED | OUTPATIENT
Start: 2022-05-19

## 2022-05-19 RX ORDER — ATORVASTATIN CALCIUM 40 MG/1
40 TABLET, FILM COATED ORAL DAILY
Qty: 30 TABLET | Refills: 10 | Status: SHIPPED | OUTPATIENT
Start: 2022-05-19

## 2022-05-19 RX ORDER — CALCIUM CITRATE/VITAMIN D3 200MG-6.25
TABLET ORAL
Qty: 100 EACH | Refills: 1 | Status: SHIPPED | OUTPATIENT
Start: 2022-05-19

## 2022-05-19 RX ORDER — PIOGLITAZONEHYDROCHLORIDE 30 MG/1
30 TABLET ORAL DAILY
Qty: 30 TABLET | Refills: 10 | OUTPATIENT
Start: 2022-05-19

## 2022-05-19 RX ORDER — FENOFIBRATE 160 MG/1
TABLET ORAL
Qty: 30 TABLET | Refills: 10 | Status: SHIPPED | OUTPATIENT
Start: 2022-05-19

## 2022-05-19 NOTE — TELEPHONE ENCOUNTER
Refill Request         Last Seen: Last Seen Department: 3/18/2022  Last Seen by PCP: 3/18/2022    Last Written: 12/16/2021    Next Appointment:   Future Appointments   Date Time Provider Lianna Calvo   6/17/2022  7:45 AM Julius Dumont [address] [place] - [facility]       Future appointment scheduled      Requested Prescriptions     Pending Prescriptions Disp Refills    fenofibrate (TRIGLIDE) 160 MG tablet [Pharmacy Med Name: FENOFIBRATE 160 MG TABS 160 Tablet] 30 tablet 10     Sig: TAKE 1 TABLET BY MOUTH ONCE DAILY

## 2022-05-19 NOTE — TELEPHONE ENCOUNTER
Refill Request         Last Seen: Last Seen Department: 8/31/2021  Last Seen by PCP: 8/31/2021    Last Written: 11/17/2021    Next Appointment:   Future Appointments   Date Time Provider Lianna Calvo   6/17/2022  7:45 AM NICK Alanis - [facility] [place] - [facility]       Future appointment scheduled      Requested Prescriptions     Pending Prescriptions Disp Refills    pioglitazone (ACTOS) 30 MG tablet 90 tablet 2     Sig: Take 1 tablet by mouth daily     Refused Prescriptions Disp Refills    pioglitazone (ACTOS) 30 MG tablet [Pharmacy Med Name: PIOGLITAZONE 30 MG TABS 30 Tablet] 30 tablet 10     Sig: TAKE 1 TABLET BY MOUTH DAILY     Refused By: Kay De La Torre     Reason for Refusal: Patient no longer under prescriber care

## 2022-05-20 RX ORDER — DICLOFENAC SODIUM 75 MG/1
TABLET, DELAYED RELEASE ORAL
Qty: 30 TABLET | Refills: 10 | Status: SHIPPED | OUTPATIENT
Start: 2022-05-20

## 2022-05-20 NOTE — TELEPHONE ENCOUNTER
Refill Request     CONFIRM preferrred pharmacy with the patient. If Mail Order Rx - Pend for 90 day refill.       Last Seen: Last Seen Department: 3/18/2022  Last Seen by PCP: 3/18/2022    Last Written: 01/20/2022, 30 tablets, 2 refills    Next Appointment:   Future Appointments   Date Time Provider Lianna Calvo   6/17/2022  7:45 AM Dominic Munson, APRN - [facility] [facility] [place]           Requested Prescriptions     Pending Prescriptions Disp Refills    diclofenac (VOLTAREN) 75 MG EC tablet [Pharmacy Med Name: DICLOFENAC SOD DR 75MG TAB 75 Tablet] 30 tablet 10     Sig: TAKE 1 TABLET BY MOUTH TWICE DAILY WITH FOOD

## 2022-07-05 RX ORDER — LANCETS 30 GAUGE
EACH MISCELLANEOUS
Qty: 100 EACH | Refills: 10 | Status: SHIPPED | OUTPATIENT
Start: 2022-07-05

## 2022-07-05 NOTE — TELEPHONE ENCOUNTER
Refill Request         Last Seen: Last Seen Department: 8/31/2021  Last Seen by PCP: 8/31/2021    Last Written: 12/16/2019    Next Appointment:   Future Appointments   Date Time Provider Lianna Calvo   7/15/2022  8:00 AM Marnie Fresh, APRN - [facility] [place] [facility]       Future appointment scheduled      Requested Prescriptions     Pending Prescriptions Disp Refills    Easy Touch Lancets 30G/Twist MISC [Pharmacy Med Name: EASY TOUCH TWI 30G SELENA 100C 30 GAUGE EACH] 100 each 10     Sig: USE TO TEST BLOOD SUGAR ONCE DAILY

## 2022-08-12 RX ORDER — METOPROLOL TARTRATE 50 MG/1
TABLET, FILM COATED ORAL
Qty: 60 TABLET | Refills: 10 | Status: SHIPPED | OUTPATIENT
Start: 2022-08-12

## 2022-08-12 NOTE — TELEPHONE ENCOUNTER
Refill Request         Last Seen: Last Seen Department: 03/18/2022  Last Seen by PCP: 03/18/2022    Last Written: 11/17/2021    Next Appointment:   08/17/2022    Future appointment scheduled      Requested Prescriptions     Pending Prescriptions Disp Refills    metoprolol tartrate (LOPRESSOR) 50 MG tablet [Pharmacy Med Name: METOPROLOL TART 50MG TABLET 50 Tablet] 60 tablet 10     Sig: TAKE ONE (1) TABLET BY MOUTH TWICE DAILY

## 2022-08-30 DIAGNOSIS — G62.9 NEUROPATHY: ICD-10-CM

## 2022-08-31 RX ORDER — LISINOPRIL 5 MG/1
TABLET ORAL
Qty: 30 TABLET | Refills: 10 | Status: SHIPPED | OUTPATIENT
Start: 2022-08-31

## 2022-08-31 RX ORDER — GABAPENTIN 400 MG/1
CAPSULE ORAL
Qty: 120 CAPSULE | Refills: 10 | Status: SHIPPED | OUTPATIENT
Start: 2022-08-31 | End: 2022-09-30

## 2022-08-31 NOTE — TELEPHONE ENCOUNTER
Refill Request     CONFIRM preferrred pharmacy with the patient. If Mail Order Rx - Pend for 90 day refill. Last Seen: Last Seen Department: 3/18/2022  Last Seen by PCP: 3/18/2022    Last Written:   1) gabapentin - 02/25/2022, 150 tablets, 2 refills  2) lisonopril - 05/17/2022, 30 tablets, 3 refills      Next Appointment:   No future appointments.         Requested Prescriptions     Pending Prescriptions Disp Refills    gabapentin (NEURONTIN) 400 MG capsule [Pharmacy Med Name: GABAPENTIN 400 MG CAPS 400 Capsule] 120 capsule 10     Sig: TAKE TWO (2) CAPSULES BY MOUTH IN THE MORNING, TAKE 1 CAPSULE AT LUNCH AND TAKE 2 CAPSULES AT BEDTIME    lisinopril (PRINIVIL;ZESTRIL) 5 MG tablet [Pharmacy Med Name: LISINOPRIL 5 MG TABS 5 Tablet] 30 tablet 10     Sig: TAKE 1 TABLET BY MOUTH DAILY

## 2022-09-11 ENCOUNTER — HOSPITAL ENCOUNTER (EMERGENCY)
Age: 57
Discharge: HOME | End: 2022-09-11
Attending: EMERGENCY MEDICINE
Payer: COMMERCIAL

## 2022-09-11 VITALS
DIASTOLIC BLOOD PRESSURE: 70 MMHG | WEIGHT: 240 LBS | TEMPERATURE: 97.9 F | SYSTOLIC BLOOD PRESSURE: 122 MMHG | RESPIRATION RATE: 20 BRPM | BODY MASS INDEX: 42.52 KG/M2 | OXYGEN SATURATION: 95 % | HEART RATE: 85 BPM | HEIGHT: 63 IN

## 2022-09-11 DIAGNOSIS — T63.441A BEE STING REACTION, ACCIDENTAL OR UNINTENTIONAL, INITIAL ENCOUNTER: Primary | ICD-10-CM

## 2022-09-11 LAB
ANION GAP SERPL CALCULATED.3IONS-SCNC: 12 MMOL/L (ref 3–16)
BASOPHILS ABSOLUTE: 0 K/UL (ref 0–0.2)
BASOPHILS RELATIVE PERCENT: 0.4 %
BUN BLDV-MCNC: 18 MG/DL (ref 7–20)
CALCIUM SERPL-MCNC: 8.9 MG/DL (ref 8.3–10.6)
CHLORIDE BLD-SCNC: 97 MMOL/L (ref 99–110)
CO2: 23 MMOL/L (ref 21–32)
CREAT SERPL-MCNC: 0.8 MG/DL (ref 0.9–1.3)
EOSINOPHILS ABSOLUTE: 0.1 K/UL (ref 0–0.6)
EOSINOPHILS RELATIVE PERCENT: 1.2 %
GFR AFRICAN AMERICAN: >60
GFR NON-AFRICAN AMERICAN: >60
GLUCOSE BLD-MCNC: 167 MG/DL (ref 70–99)
HCT VFR BLD CALC: 41.6 % (ref 40.5–52.5)
HEMOGLOBIN: 14.5 G/DL (ref 13.5–17.5)
LYMPHOCYTES ABSOLUTE: 1.7 K/UL (ref 1–5.1)
LYMPHOCYTES RELATIVE PERCENT: 15.3 %
MCH RBC QN AUTO: 31.8 PG (ref 26–34)
MCHC RBC AUTO-ENTMCNC: 34.9 G/DL (ref 31–36)
MCV RBC AUTO: 91.2 FL (ref 80–100)
MONOCYTES ABSOLUTE: 0.6 K/UL (ref 0–1.3)
MONOCYTES RELATIVE PERCENT: 5.6 %
NEUTROPHILS ABSOLUTE: 8.4 K/UL (ref 1.7–7.7)
NEUTROPHILS RELATIVE PERCENT: 77.5 %
PDW BLD-RTO: 12 % (ref 12.4–15.4)
PLATELET # BLD: 188 K/UL (ref 135–450)
PMV BLD AUTO: 9.7 FL (ref 5–10.5)
POTASSIUM REFLEX MAGNESIUM: 4.2 MMOL/L (ref 3.5–5.1)
RBC # BLD: 4.56 M/UL (ref 4.2–5.9)
SODIUM BLD-SCNC: 132 MMOL/L (ref 136–145)
SPECIMEN STATUS: NORMAL
WBC # BLD: 10.8 K/UL (ref 4–11)

## 2022-09-11 PROCEDURE — 96374 THER/PROPH/DIAG INJ IV PUSH: CPT

## 2022-09-11 PROCEDURE — 96375 TX/PRO/DX INJ NEW DRUG ADDON: CPT

## 2022-09-11 PROCEDURE — 85025 COMPLETE CBC W/AUTO DIFF WBC: CPT

## 2022-09-11 PROCEDURE — 6360000002 HC RX W HCPCS: Performed by: EMERGENCY MEDICINE

## 2022-09-11 PROCEDURE — 80048 BASIC METABOLIC PNL TOTAL CA: CPT

## 2022-09-11 PROCEDURE — 99284 EMERGENCY DEPT VISIT MOD MDM: CPT

## 2022-09-11 PROCEDURE — 2500000003 HC RX 250 WO HCPCS: Performed by: EMERGENCY MEDICINE

## 2022-09-11 PROCEDURE — 6370000000 HC RX 637 (ALT 250 FOR IP): Performed by: EMERGENCY MEDICINE

## 2022-09-11 RX ORDER — DIPHENHYDRAMINE HCL 25 MG
25 CAPSULE ORAL 3 TIMES DAILY
Qty: 15 CAPSULE | Refills: 0 | Status: SHIPPED | OUTPATIENT
Start: 2022-09-11 | End: 2022-09-16

## 2022-09-11 RX ORDER — IPRATROPIUM BROMIDE AND ALBUTEROL SULFATE 2.5; .5 MG/3ML; MG/3ML
1 SOLUTION RESPIRATORY (INHALATION) ONCE
Status: COMPLETED | OUTPATIENT
Start: 2022-09-11 | End: 2022-09-11

## 2022-09-11 RX ORDER — FAMOTIDINE 10 MG/ML
20 INJECTION, SOLUTION INTRAVENOUS ONCE
Status: COMPLETED | OUTPATIENT
Start: 2022-09-11 | End: 2022-09-11

## 2022-09-11 RX ORDER — PREDNISONE 20 MG/1
40 TABLET ORAL DAILY
Qty: 10 TABLET | Refills: 0 | Status: SHIPPED | OUTPATIENT
Start: 2022-09-11 | End: 2022-09-16

## 2022-09-11 RX ORDER — FAMOTIDINE 20 MG/1
20 TABLET, FILM COATED ORAL 2 TIMES DAILY
Qty: 10 TABLET | Refills: 0 | Status: SHIPPED | OUTPATIENT
Start: 2022-09-11 | End: 2022-09-16

## 2022-09-11 RX ORDER — EPINEPHRINE 0.3 MG/.3ML
0.3 INJECTION SUBCUTANEOUS ONCE
Qty: 0.3 ML | Refills: 0 | Status: SHIPPED | OUTPATIENT
Start: 2022-09-11 | End: 2022-09-11

## 2022-09-11 RX ORDER — DIPHENHYDRAMINE HYDROCHLORIDE 50 MG/ML
25 INJECTION INTRAMUSCULAR; INTRAVENOUS ONCE
Status: COMPLETED | OUTPATIENT
Start: 2022-09-11 | End: 2022-09-11

## 2022-09-11 RX ORDER — MORPHINE SULFATE 4 MG/ML
4 INJECTION, SOLUTION INTRAMUSCULAR; INTRAVENOUS ONCE
Status: COMPLETED | OUTPATIENT
Start: 2022-09-11 | End: 2022-09-11

## 2022-09-11 RX ORDER — METHYLPREDNISOLONE SODIUM SUCCINATE 125 MG/2ML
125 INJECTION, POWDER, LYOPHILIZED, FOR SOLUTION INTRAMUSCULAR; INTRAVENOUS ONCE
Status: COMPLETED | OUTPATIENT
Start: 2022-09-11 | End: 2022-09-11

## 2022-09-11 RX ADMIN — DIPHENHYDRAMINE HYDROCHLORIDE 25 MG: 50 INJECTION, SOLUTION INTRAMUSCULAR; INTRAVENOUS at 03:26

## 2022-09-11 RX ADMIN — IPRATROPIUM BROMIDE AND ALBUTEROL SULFATE 1 AMPULE: .5; 2.5 SOLUTION RESPIRATORY (INHALATION) at 03:32

## 2022-09-11 RX ADMIN — MORPHINE SULFATE 4 MG: 4 INJECTION, SOLUTION INTRAMUSCULAR; INTRAVENOUS at 03:26

## 2022-09-11 RX ADMIN — METHYLPREDNISOLONE SODIUM SUCCINATE 125 MG: 125 INJECTION, POWDER, FOR SOLUTION INTRAMUSCULAR; INTRAVENOUS at 03:25

## 2022-09-11 RX ADMIN — FAMOTIDINE 20 MG: 10 INJECTION, SOLUTION INTRAVENOUS at 03:25

## 2022-09-11 ASSESSMENT — LIFESTYLE VARIABLES
HOW MANY STANDARD DRINKS CONTAINING ALCOHOL DO YOU HAVE ON A TYPICAL DAY: 1 OR 2
HOW OFTEN DO YOU HAVE A DRINK CONTAINING ALCOHOL: 2-4 TIMES A MONTH

## 2022-09-11 ASSESSMENT — ENCOUNTER SYMPTOMS
COUGH: 0
ABDOMINAL PAIN: 0
RHINORRHEA: 0
SHORTNESS OF BREATH: 1

## 2022-09-11 ASSESSMENT — PAIN SCALES - GENERAL: PAINLEVEL_OUTOF10: 8

## 2022-09-11 NOTE — DISCHARGE INSTRUCTIONS
Prednisone, one of the medications you are prescribed does tend to raise your blood sugar levels. Ensure that you are measuring your blood sugar frequently and that you are taking all of your diabetes medication as prescribed. You were prescribed an EpiPen. Should you have worsening shortness of breath or swelling of your lips or tongue or vomiting uncontrollably please use this medication as prescribed.

## 2022-09-11 NOTE — ED PROVIDER NOTES
[facility]  ED  EMERGENCY DEPARTMENT ENCOUNTER      Pt Name: Terri Marie  MRN: [id number]  [place] 1965  Date of evaluation: 9/11/2022  Provider: Alaina Valenzuela MD    CHIEF COMPLAINT       Chief Complaint   Patient presents with    Allergic Reaction     Got stung by 20-30 bees earlier today. Patient does not have a known allergy however woke up all swollen          HISTORY OF PRESENT ILLNESS   (Location/Symptom, Timing/Onset,Context/Setting, Quality, Duration, Modifying Factors, Severity)  Note limiting factors. Terri Marie is a 62 y.o. male who presents to the emergency department for bee stings, about 1:30p, got stung by maybe 20-30 bees after accidentally getting into a bee hive. He has chronic SOB that is not worse, no difficulty, no swelling of throat or tongue. No abdominal , no nausea or vomiting, no lightheadedness. Nursing notes were reviewed. REVIEW OF SYSTEMS    (2-9 systems for level 4, 10 or more for level 5)     Review of Systems   Constitutional:  Negative for fever. HENT:  Negative for rhinorrhea. Respiratory:  Positive for shortness of breath. Negative for cough. At baseline   Cardiovascular:  Negative for chest pain. Gastrointestinal:  Negative for abdominal pain. Genitourinary:  Negative for flank pain. Skin:  Positive for rash. Neurological:  Negative for headaches.        PAST MEDICAL HISTORY     Past Medical History:   Diagnosis Date    Asthma     Diabetes mellitus ([address]     Hyperlipidemia     Hypertension     Lateral epicondylitis of left elbow 8/22/2019    Microalbuminuria     LIANE on CPAP     LIANE treated with BiPAP     LIANE treated with BiPAP     SOB (shortness of breath)     Umbilical hernia          SURGICALHISTORY       Past Surgical History:   Procedure Laterality Date    COLONOSCOPY      polyp, hemorroids    HERNIA REPAIR  38/59/82    open umbilical hernia repair with mesh    SPLENECTOMY, PARTIAL      repair of \"busted SPRAY IN EACH NOSTRIL DAILY, Disp-16 g, R-10Normal      nicotine (NICODERM CQ) 14 MG/24HR Place 1 patch onto the skin daily, Disp-42 patch, R-0Normal      !! insulin glargine (LANTUS SOLOSTAR) 100 UNIT/ML injection pen Inject 50 Units into the skin nightly, Disp-5 pen, R-3Normal      SYMBICORT 80-4.5 MCG/ACT AERO INHALE 2 PUFFS BY MOUTH TWICE DAILY, Disp-3 each, R-3Normal      !! BASAGLAR KWIKPEN 100 UNIT/ML injection pen INJECT 50 UNITS SUBCUTANEOUSLY NIGHTLY, Disp-15 mL, R-11Normal      !! TRUEPLUS LANCETS 28G MISC Disp-100 each, R-10, NormalAs indicated. !! Lancets MISC Disp-100 each, R-3, NormalTest once daily for diabetes e11.9      !! Blood Glucose Monitoring Suppl (TRUE METRIX METER) w/Device KIT USE DAILY TO CHECK BLOOD SUGAR, Disp-1 kit, R-0Normal      !! Blood Glucose Monitoring Suppl (TRUE METRIX METER) w/Device KIT USE DAILY TO CHECK BLOOD SUGAR, Disp-1 kit, R-0Normal      !! ONE TOUCH LANCETS MISC DAILY Starting 2019, Disp-100 each, R-3, Normal      Respiratory Therapy Supplies (NEBULIZER COMPRESSOR) KIT DAILY PRN Starting 2016, Until Discontinued, Disp-1 kit, R-0, NormalUse with inhalation solution, DX: J45.41       !! - Potential duplicate medications found. Please discuss with provider.           ALLERGIES     Ibuprofen and Metformin and related    FAMILY HISTORY       Family History   Problem Relation Age of Onset    Diabetes Mother     Diabetes Father           SOCIAL HISTORY       Social History     Socioeconomic History    Marital status:      Spouse name: None    Number of children: None    Years of education: None    Highest education level: None   Occupational History    Occupation: window installation   Tobacco Use    Smoking status: Former     Packs/day: 0.50     Years: 30.00     Pack years: 15.00     Types: Cigarettes     Quit date: 2021     Years since quittin.6    Smokeless tobacco: Never    Tobacco comments:     counseled 14   Vaping Use    Vaping Use: Never used   Substance and Sexual Activity    Alcohol use: No    Drug use: No       SCREENINGS    Kamilla Coma Scale  Eye Opening: Spontaneous  Best Verbal Response: Oriented  Best Motor Response: Obeys commands  Kamilla Coma Scale Score: 15        PHYSICAL EXAM    (up to 7 for level 4, 8 or more for level 5)     ED Triage Vitals [09/11/22 0259]   BP Temp Temp Source Heart Rate Resp SpO2 Height Weight   133/81 97.9 °F (36.6 °C) Oral 88 16 98 % 5' 3\" (1.6 m) 240 lb (108.9 kg)       Physical Exam  Vitals and nursing note reviewed. Constitutional:       Appearance: Normal appearance. He is well-developed. He is not ill-appearing. HENT:      Head: Normocephalic and atraumatic. Right Ear: External ear normal.      Left Ear: External ear normal.      Nose: Nose normal.      Mouth/Throat:      Mouth: Mucous membranes are moist.      Comments: No facial swelling or swelling of the lips or tongue, no pharyngeal swelling the uvula is midline  Eyes:      General: No scleral icterus. Right eye: No discharge. Left eye: No discharge. Conjunctiva/sclera: Conjunctivae normal.   Cardiovascular:      Rate and Rhythm: Normal rate and regular rhythm. Heart sounds: Normal heart sounds. Pulmonary:      Effort: Pulmonary effort is normal. No respiratory distress. Breath sounds: No stridor. Wheezing present. No rales. Abdominal:      General: Bowel sounds are normal.   Musculoskeletal:      Cervical back: Neck supple. Skin:     Coloration: Skin is not pale. Comments: Patient has hives worse to the torso. Also to the hands patient has hives were to the torso and also to the fingers hives especially to the torso and the fingers. He has edema of his hands   Neurological:      Mental Status: He is alert.    Psychiatric:         Mood and Affect: Mood normal.         Behavior: Behavior normal.           DIAGNOSTIC RESULTS     EKG: All EKG's are interpreted by the Emergency Department Physician who either signs or Co-signs this chart in the absence of a cardiologist.    12 lead EKG shows     RADIOLOGY:   Non-plain film images such as CT, Ultrasound and MRI are read by the radiologist. Plain radiographic images are visualized and preliminarily interpreted by the emergency physician with the below findings:        Interpretation per the Radiologist below, if available at the time of this note:    No orders to display         ED BEDSIDE ULTRASOUND:   Performed by ED Physician - none    LABS:  Labs Reviewed   CBC WITH AUTO DIFFERENTIAL - Abnormal; Notable for the following components:       Result Value    RDW 12.0 (*)     Neutrophils Absolute 8.4 (*)     All other components within normal limits   BASIC METABOLIC PANEL W/ REFLEX TO MG FOR LOW K - Abnormal; Notable for the following components:    Sodium 132 (*)     Chloride 97 (*)     Glucose 167 (*)     Creatinine 0.8 (*)     All other components within normal limits   SAMPLE POSSIBLE BLOOD BANK TESTING       All other labs were within normal range or not returned as of this dictation. EMERGENCY DEPARTMENT COURSE and DIFFERENTIAL DIAGNOSIS/MDM:   Vitals:    Vitals:    09/11/22 0330 09/11/22 0400 09/11/22 0430 09/11/22 0500   BP: 122/61 (!) 122/54 (!) 102/56 122/70   Pulse: 79 87 90 85   Resp: 18 16 18 20   Temp:       TempSrc:       SpO2: 97% 92% 93% 95%   Weight:       Height:           Adult male who was stung by several bees this evening. Now he is having generalized edema and pain. Patient did take Benadryl but it has been more than 6 at 8 hours. Patient is given Benadryl Solu-Medrol and famotidine here. Patient also given a DuoNeb. I do not give any epinephrine as the patient states his wheezing is at his baseline he usually feels short of breath and he did not use his inhaler this evening. Patient was given 1 DuoNeb and upon reassessment he has improvement of his wheezing. He has no stridor he has no swelling of the airway.   Patient is kept under observation and his hives and erythema are improving. I discussed with the patient that he will be discharged home. Careful instructions are given for follow-up and return. The patient is agreeable with the plan. Is this patient to be included in the SEP-1 Core Measure due to severe sepsis or septic shock? No   Exclusion criteria - the patient is NOT to be included for SEP-1 Core Measure due to: Infection is not suspected     CRITICAL CARE TIME   Total Critical Care time was 15 minutes, excluding separately reportable procedures. There was a high probability of clinically significant/life threatening deterioration in the patient's condition which required my urgent intervention. CONSULTS:  None    PROCEDURES:       Procedures    FINAL IMPRESSION      1. Bee sting reaction, accidental or unintentional, initial encounter          DISPOSITION/PLAN   DISPOSITION Decision To Discharge 09/11/2022 05:28:20 AM      PATIENT REFERREDTO:  DO Terri Che 44  [phone]    Schedule an appointment as soon as possible for a visit       DISCHARGEMEDICATIONS:  Discharge Medication List as of 9/11/2022  5:37 AM        START taking these medications    Details   predniSONE (DELTASONE) 20 MG tablet Take 2 tablets by mouth daily for 5 days, Disp-10 tablet, R-0Print      diphenhydrAMINE (BENADRYL) 25 MG capsule Take 1 capsule by mouth in the morning, at noon, and at bedtime for 5 days, Disp-15 capsule, R-0Print      famotidine (PEPCID) 20 MG tablet Take 1 tablet by mouth 2 times daily for 5 days, Disp-10 tablet, R-0Print      EPINEPHrine (EPIPEN 2-LINDA) 0.3 MG/0.3ML SOAJ injection Inject 0.3 mLs into the muscle once for 1 dose Use as directed for allergic reaction, immediately call 911 or come to the emergency room if you ever need to use this medication. , Disp-0.3 mL, R-0Print                (Please note that portions of this note were completed with a voice recognition program.  Efforts were made to edit the dictations but occasionally words are mis-transcribed.)    Renan Bui MD (electronically signed)  Attending Emergency Physician  I am the primary physician of record          Renan Bui MD  09/11/22 [address] MD  09/11/22 1986

## 2022-10-20 RX ORDER — DILTIAZEM HYDROCHLORIDE 60 MG/1
TABLET, FILM COATED ORAL
Qty: 10.2 G | Refills: 10 | OUTPATIENT
Start: 2022-10-20

## 2022-11-07 RX ORDER — DILTIAZEM HYDROCHLORIDE 60 MG/1
TABLET, FILM COATED ORAL
Qty: 10.2 G | Refills: 10 | OUTPATIENT
Start: 2022-11-07

## 2022-11-07 RX ORDER — FLUTICASONE PROPIONATE 50 MCG
SPRAY, SUSPENSION (ML) NASAL
Qty: 16 G | Refills: 10 | OUTPATIENT
Start: 2022-11-07

## 2022-12-05 RX ORDER — DILTIAZEM HYDROCHLORIDE 60 MG/1
TABLET, FILM COATED ORAL
Qty: 10.2 G | Refills: 10 | OUTPATIENT
Start: 2022-12-05

## 2022-12-05 RX ORDER — CALCIUM CITRATE/VITAMIN D3 200MG-6.25
TABLET ORAL
Refills: 10 | OUTPATIENT
Start: 2022-12-05

## 2022-12-05 RX ORDER — FLUTICASONE PROPIONATE 50 MCG
SPRAY, SUSPENSION (ML) NASAL
Qty: 16 G | Refills: 10 | OUTPATIENT
Start: 2022-12-05

## 2022-12-07 RX ORDER — CALCIUM CITRATE/VITAMIN D3 200MG-6.25
TABLET ORAL
Refills: 10 | OUTPATIENT
Start: 2022-12-07

## 2022-12-27 RX ORDER — CALCIUM CITRATE/VITAMIN D3 200MG-6.25
TABLET ORAL
Refills: 10 | OUTPATIENT
Start: 2022-12-27

## 2023-01-16 ENCOUNTER — TELEPHONE (OUTPATIENT)
Dept: PULMONOLOGY | Age: 58
End: 2023-01-16

## 2023-01-16 DIAGNOSIS — G47.33 SEVERE OBSTRUCTIVE SLEEP APNEA: Primary | ICD-10-CM

## 2023-01-16 NOTE — TELEPHONE ENCOUNTER
Okay for new BiPAP 20/15 cm H2O. Agree patient should make DME aware.   He may need a loaner in the meantime if they are not able to replace it quickly

## 2023-01-16 NOTE — TELEPHONE ENCOUNTER
Pt called back stating that he is waking up and there is smoke in the room from his BiPAP machine. Pt states that his current machine is approx 11 years old. Pt was also given phone number to call [employer] [facility] to report this.

## 2023-01-16 NOTE — TELEPHONE ENCOUNTER
Pt LVM stating that his machine smells like it is burning and it is waking him up. Assessment:       Severe LIANE. BiPAP 20/15 cm H2O. Optimal compliance and efficacy on review today. Snoring- resolved on BIPAP  Witnessed apnea -resolved on BIPAP  Hypersomnia-resolved with BiPAP use. Asthma, DM, HTN- followed by PCP  Obesity         Plan:       Continue BIPAP 20/15 cm H2O  Will send order for supplies to DME. Order for DME to check patient's BiPAP functionality. Can order new machine if needed. Patient will let us know  Discussed severity of sleep apnea and importance of BiPAP use  Advised to use BiPAP 6-8 hrs at night and during naps. Replacement of mask, tubing, head straps every 3-6 months or sooner if damaged. Patient instructed to contact DME company for any mask, tubing or machine trouble shooting if problems arise. Sleep hygiene  Avoid sedatives, alcohol and caffeinated drinks at bed time. No driving motorized vehicles or operating heavy machinery while fatigue, drowsy or sleepy. - patient verbalized understanding and agrees. Weight loss is also recommended as a long-term intervention. Complications of LIANE if not treated were discussed with patient patient to include systemic hypertension, pulmonary hypertension, cardiovascular morbidities, car accidents and all cause mortality.   Patient education  regarding sleep tips and PAP cleaning recommendations         Follow up: as scheduled, sooner if needed

## 2023-01-17 NOTE — TELEPHONE ENCOUNTER
Order signed.   Patient should be set up as soon as possible and will likely need 31-90-day follow-up

## 2023-01-24 NOTE — TELEPHONE ENCOUNTER
Please call [facility] to see if they can rush set up or get a loaner or see if another DME company could get patient set up ASAP if he is willing. I would recommend he use local DME company as I do not believe [employer] is able to link BiPAP, provide BiPAP download reports, adjust settings when needed. Also need to make sure he is getting a BiPAP that has AHI recording.

## 2023-01-24 NOTE — TELEPHONE ENCOUNTER
Spoke to Maylin Mcdaniels at [facility] whom is covering for [facility] today. She said that they need F2F notes. Will fax notes from 2/23/22. She will pass the message along to rush set up/     Spoke with patient whom stated he did get machine today from [facility] [employer] but would still like to get machine from [facility] as well

## 2023-01-30 NOTE — TELEPHONE ENCOUNTER
Spoke to Elda at [facility] who stated they did not get the face to face notes. Notes refaxed from 2/23/22 to [facility] fax number [phone]

## 2023-01-31 NOTE — TELEPHONE ENCOUNTER
Manjula Silva returned call and said that she rec'd notes. They are running everything through patients insurance.  Will continue to follow up

## 2023-02-07 NOTE — TELEPHONE ENCOUNTER
Spoke to [place] and she said she will check on status and call tomorrow.  She is currently driving

## 2023-02-09 NOTE — TELEPHONE ENCOUNTER
Spoke to Vivek Reyes and she said that the [facility] is still pending.  She is going to reachh out to the [facility] team and have them follow up

## 2023-02-16 NOTE — TELEPHONE ENCOUNTER
Spoke to [place] from [facility] and she said the last note is still from 1/19/23. Raj Zuleta said that she has submitted multiple requests for updates.  She will request another update

## 2023-02-17 RX ORDER — INSULIN DETEMIR 100 [IU]/ML
INJECTION, SOLUTION SUBCUTANEOUS
Qty: 15 ML | Refills: 10 | OUTPATIENT
Start: 2023-02-17

## 2023-02-17 NOTE — TELEPHONE ENCOUNTER
Karen Rankin called and said that they are needing notes with in 6 months of PAP order.  Patient has appt 2/22/23

## 2023-02-22 ENCOUNTER — OFFICE VISIT (OUTPATIENT)
Dept: SLEEP MEDICINE | Age: 58
End: 2023-02-22
Payer: COMMERCIAL

## 2023-02-22 ENCOUNTER — TELEPHONE (OUTPATIENT)
Dept: PULMONOLOGY | Age: 58
End: 2023-02-22

## 2023-02-22 VITALS
TEMPERATURE: 97.7 F | SYSTOLIC BLOOD PRESSURE: 126 MMHG | BODY MASS INDEX: 42.7 KG/M2 | HEIGHT: 63 IN | OXYGEN SATURATION: 98 % | HEART RATE: 70 BPM | RESPIRATION RATE: 18 BRPM | DIASTOLIC BLOOD PRESSURE: 64 MMHG | WEIGHT: 241 LBS

## 2023-02-22 DIAGNOSIS — Z71.89 ENCOUNTER FOR BIPAP USE COUNSELING: ICD-10-CM

## 2023-02-22 DIAGNOSIS — I10 ESSENTIAL HYPERTENSION: ICD-10-CM

## 2023-02-22 DIAGNOSIS — E66.01 OBESITY, MORBID, BMI 40.0-49.9 (HCC): ICD-10-CM

## 2023-02-22 DIAGNOSIS — R53.83 OTHER FATIGUE: ICD-10-CM

## 2023-02-22 DIAGNOSIS — G47.33 SEVERE OBSTRUCTIVE SLEEP APNEA: Primary | ICD-10-CM

## 2023-02-22 PROCEDURE — G8417 CALC BMI ABV UP PARAM F/U: HCPCS | Performed by: NURSE PRACTITIONER

## 2023-02-22 PROCEDURE — 3078F DIAST BP <80 MM HG: CPT | Performed by: NURSE PRACTITIONER

## 2023-02-22 PROCEDURE — G8427 DOCREV CUR MEDS BY ELIG CLIN: HCPCS | Performed by: NURSE PRACTITIONER

## 2023-02-22 PROCEDURE — 3017F COLORECTAL CA SCREEN DOC REV: CPT | Performed by: NURSE PRACTITIONER

## 2023-02-22 PROCEDURE — G8482 FLU IMMUNIZE ORDER/ADMIN: HCPCS | Performed by: NURSE PRACTITIONER

## 2023-02-22 PROCEDURE — 3074F SYST BP LT 130 MM HG: CPT | Performed by: NURSE PRACTITIONER

## 2023-02-22 PROCEDURE — 99214 OFFICE O/P EST MOD 30 MIN: CPT | Performed by: NURSE PRACTITIONER

## 2023-02-22 PROCEDURE — 1036F TOBACCO NON-USER: CPT | Performed by: NURSE PRACTITIONER

## 2023-02-22 ASSESSMENT — SLEEP AND FATIGUE QUESTIONNAIRES
HOW LIKELY ARE YOU TO NOD OFF OR FALL ASLEEP WHILE SITTING AND TALKING TO SOMEONE: 0
HOW LIKELY ARE YOU TO NOD OFF OR FALL ASLEEP WHILE SITTING INACTIVE IN A PUBLIC PLACE: 0
HOW LIKELY ARE YOU TO NOD OFF OR FALL ASLEEP WHEN YOU ARE A PASSENGER IN A CAR FOR AN HOUR WITHOUT A BREAK: 3
HOW LIKELY ARE YOU TO NOD OFF OR FALL ASLEEP WHILE LYING DOWN TO REST IN THE AFTERNOON WHEN CIRCUMSTANCES PERMIT: 1
NECK CIRCUMFERENCE (INCHES): 18.5
ESS TOTAL SCORE: 12
HOW LIKELY ARE YOU TO NOD OFF OR FALL ASLEEP WHILE SITTING AND READING: 2
HOW LIKELY ARE YOU TO NOD OFF OR FALL ASLEEP WHILE WATCHING TV: 2
HOW LIKELY ARE YOU TO NOD OFF OR FALL ASLEEP WHILE SITTING QUIETLY AFTER LUNCH WITHOUT ALCOHOL: 3
HOW LIKELY ARE YOU TO NOD OFF OR FALL ASLEEP IN A CAR, WHILE STOPPED FOR A FEW MINUTES IN TRAFFIC: 1

## 2023-02-22 NOTE — PROGRESS NOTES
Patient ID: Kj Gamboa is a 62 y.o. male who is being seen today for   Chief Complaint   Patient presents with    Sleep Apnea     1 year sleep - new orders for PAP      Self referral    HPI:     Kj Gamboa is a 62 y.o. male in office for LIANE follow up. States BIPAP malfunctioning needed a new BIPAP, and it is over 11years old. Patient is using BiPAP   8-9 hrs/night. Using humidifier. No snoring on BiPAP. The pressure is well tolerated, does feel low initially but he is unsure if ramp is on. The mask is comfortable- full face. No mask leak. No significant daytime sleepiness. No nodding off when driving. No dry nose or throat. Some fatigue. Bedtime is 630-7 pm and rise time is 430-5 am. Sleep onset is few minutes. Wakes up 3 times at night total. 3 nocturia. It takes few-60 minutes to fall back a sleep, might watch TV. Rare naps during the day. No headache in am. No weight gain. No caffienated beverages during the day. Occasional alcohol. ESS is 12.       Sleep Medicine 2/22/2023 2/23/2022 2/23/2021 9/25/2020 2/19/2020 2/19/2020 2/13/2019   Sitting and reading 2 0 0 2 0 0 0   Watching TV 2 0 1 1 0 0 0   Sitting, inactive in a public place (e.g. a theatre or a meeting) 0 0 0 0 0 0 0   As a passenger in a car for an hour without a break 3 1 1 0 0 0 0   Lying down to rest in the afternoon when circumstances permit 1 1 1 2 3 3 2   Sitting and talking to someone 0 0 0 0 0 0 0   Sitting quietly after a lunch without alcohol 3 2 0 0 1 1 0   In a car, while stopped for a few minutes in traffic 1 0 0 1 0 3 0   [place] Sleepiness Score 12 4 3 6 4 7 2   Neck circumference (Inches) 18.5 - - - - 19.5 19.5       Past Medical History:  Past Medical History:   Diagnosis Date    Asthma     Diabetes mellitus ([facility] [address]     Hyperlipidemia     Hypertension     Lateral epicondylitis of left elbow 8/22/2019    Microalbuminuria     LIANE on CPAP     LIANE treated with BiPAP     LIANE treated with BiPAP     SOB (shortness of breath) Umbilical hernia        Past Surgical History:        Procedure Laterality Date    COLONOSCOPY      polyp, hemorroids    HERNIA REPAIR  40/19/02    open umbilical hernia repair with mesh    SPLENECTOMY, PARTIAL      repair of \"busted spleen\"    UMBILICAL HERNIA REPAIR         Allergies:  is allergic to ibuprofen and metformin and related. Social History:    TOBACCO:   reports that he quit smoking about 2 years ago. His smoking use included cigarettes. He has a 15.00 pack-year smoking history. He has never used smokeless tobacco.  ETOH:   reports no history of alcohol use.     Family History:       Problem Relation Age of Onset    Diabetes Mother     Diabetes Father        Current Medications:    Current Outpatient Medications:     gabapentin (NEURONTIN) 400 MG capsule, TAKE TWO (2) CAPSULES BY MOUTH IN THE MORNING, TAKE 1 CAPSULE AT LUNCH AND TAKE 2 CAPSULES AT BEDTIME, Disp: 120 capsule, Rfl: 10    lisinopril (PRINIVIL;ZESTRIL) 5 MG tablet, TAKE 1 TABLET BY MOUTH DAILY, Disp: 30 tablet, Rfl: 10    metoprolol tartrate (LOPRESSOR) 50 MG tablet, TAKE ONE (1) TABLET BY MOUTH TWICE DAILY, Disp: 60 tablet, Rfl: 10    Easy Touch Lancets 30G/Twist MISC, USE TO TEST BLOOD SUGAR ONCE DAILY, Disp: 100 each, Rfl: 10    diclofenac (VOLTAREN) 75 MG EC tablet, TAKE 1 TABLET BY MOUTH TWICE DAILY WITH FOOD, Disp: 30 tablet, Rfl: 10    atorvastatin (LIPITOR) 40 MG tablet, TAKE 1 TABLET BY MOUTH DAILY, Disp: 30 tablet, Rfl: 10    fenofibrate (TRIGLIDE) 160 MG tablet, TAKE 1 TABLET BY MOUTH ONCE DAILY, Disp: 30 tablet, Rfl: 10    TRUE METRIX BLOOD GLUCOSE TEST strip, USE TO TEST BLOOD SUGAR ONCE DAILY, Disp: 100 each, Rfl: 1    pioglitazone (ACTOS) 30 MG tablet, Take 1 tablet by mouth daily (Patient not taking: Reported on 2/22/2023), Disp: 90 tablet, Rfl: 2    LEVEMIR FLEXTOUCH 100 UNIT/ML injection pen, INJECT 50 UNITS SUBCUTANEOUSLY NIGHTLY, Disp: 15 mL, Rfl: 10    UNIFINE PENTIPS PLUS 31G X 5 MM MISC, USE FOR INJECTION DAILY, Disp: 100 each, Rfl: 10    omega-3 acid ethyl esters (LOVAZA) 1 g capsule, TAKE TWO (2) CAPSULES BY MOUTH TWICE DAILY, Disp: 120 capsule, Rfl: 10    PROAIR  (90 Base) MCG/ACT inhaler, INHALE 2 PUFFS BY MOUTH INTO THE LUNGS EVERY 6 HOURS AS NEEDED FOR WHEEZING, Disp: 8.5 g, Rfl: 11    tiotropium (SPIRIVA RESPIMAT) 2.5 MCG/ACT AERS inhaler, INHALE TWO (2) PUFFS BY MOUTH INTO THE LUNGS DAILY, Disp: 4 g, Rfl: 10    fluticasone (FLONASE) 50 MCG/ACT nasal spray, USE 1 SPRAY IN EACH NOSTRIL DAILY, Disp: 16 g, Rfl: 10    SYMBICORT 80-4.5 MCG/ACT AERO, INHALE 2 PUFFS BY MOUTH TWICE DAILY, Disp: 3 each, Rfl: 3    TRUEPLUS LANCETS 28G MISC, As indicated. , Disp: 100 each, Rfl: 10    Lancets MISC, Test once daily for diabetes e11.9, Disp: 100 each, Rfl: 3    Blood Glucose Monitoring Suppl (TRUE METRIX METER) w/Device KIT, USE DAILY TO CHECK BLOOD SUGAR, Disp: 1 kit, Rfl: 0    Blood Glucose Monitoring Suppl (TRUE METRIX METER) w/Device KIT, USE DAILY TO CHECK BLOOD SUGAR, Disp: 1 kit, Rfl: 0    ONE TOUCH LANCETS MISC, 1 each by Does not apply route daily, Disp: 100 each, Rfl: 3    Respiratory Therapy Supplies (NEBULIZER COMPRESSOR) KIT, 1 kit by Does not apply route daily as needed (use as needed) Use with inhalation solution, DX: J45.41, Disp: 1 kit, Rfl: 0    famotidine (PEPCID) 20 MG tablet, Take 1 tablet by mouth 2 times daily for 5 days, Disp: 10 tablet, Rfl: 0    EPINEPHrine (EPIPEN 2-LINDA) 0.3 MG/0.3ML SOAJ injection, Inject 0.3 mLs into the muscle once for 1 dose Use as directed for allergic reaction, immediately call 911 or come to the emergency room if you ever need to use this medication. , Disp: 0.3 mL, Rfl: 0    varenicline (CHANTIX) 1 MG tablet, Take 1 tablet by mouth 2 times daily (Patient not taking: Reported on 2/22/2023), Disp: 180 tablet, Rfl: 1    nicotine (NICODERM CQ) 14 MG/24HR, Place 1 patch onto the skin daily, Disp: 42 patch, Rfl: 0    insulin glargine (LANTUS SOLOSTAR) 100 UNIT/ML injection pen, no Inject 50 Units into the skin nightly (Patient not taking: Reported on 2/22/2023), Disp: 5 pen, Rfl: 3    BASAGLAR KWIKPEN 100 UNIT/ML injection pen, INJECT 50 UNITS SUBCUTANEOUSLY NIGHTLY (Patient not taking: Reported on 2/22/2023), Disp: 15 mL, Rfl: 11      Review of Systems    Objective:   PHYSICAL EXAM:    /64   Pulse 70   Temp 97.7 °F (36.5 °C)   Resp 18   Ht 5' 3\" (1.6 m)   Wt 241 lb (109.3 kg)   SpO2 98% Comment: RA  BMI 42.69 kg/m²     Physical Exam  Gen: No acute distress. Obese. BMI of 42.69  Eyes: PERRL. No sclera icterus. No conjunctival injection. ENT: No discharge. Neck: Trachea midline. No obvious mass. Neck circumference 18.5\"  Resp: No accessory muscle use. No crackles. No wheezes. No rhonchi. CV: Regular rate. Regular rhythm. No murmur or rub. Skin: Warm and dry. M/S: No cyanosis. No obvious joint deformity. Neuro: Awake. Alert. Moves all four extremities. Psych: Oriented x 3. No anxiety. DATA:   10/25/17 split-night sleep study AHI 86.3, low SPO2 56%, improved sleep-related breathing with BiPAP, recommendations BiPAP 21/16 cm H2O    BIPAP compliance data:  Compliance download report from 1/14/18 to 2/12/18  showed patient is using machine 6:32 hrs/night with 93% compliance and AHI 0.5 within this time frame. 28/30days with greater than 4 hours of machine use. BIPAP 21/16 cm H20    Compliance download report from 1/14/19 to 2/12/19 showed patient is using machine 8:33 hrs/night with 100% compliance and AHI 0.2 within this time frame. 30/30days with greater than 4 hours of machine use. BIPAP 20/15 cm H20    Compliance download report from 1/20/20 to 2/18/20 showed patient is using machine 8:05 hrs/night with 100% compliance and AHI 0.1 within this time frame. 30/30days with greater than 4 hours of machine use.   BIPAP 20/15 cm H20    Compliance download report from 8/26/20 to 9/24/20 reviewed today by me and showed patient is using machine 8:39 hrs/night with 100% compliance and AHI 0.2 within this time frame.  30/30days with greater than 4 hours of machine use.  BIPAP 20/15 cm H20      Compliance download report from 1/23/21 to 2/21/21 reviewed today by me and showed patient is using machine 8:40 hrs/night with 100% compliance and AHI 0.2 within this time frame.  30/30days with greater than 4 hours of machine use.  BIPAP 20/15 cm H20     Compliance download report from 12/24/22 to 1/22/23 reviewed today by me and showed patient is using machine 8:57 hrs/night with 100% compliance and AHI 0.2 within this time frame.  30/30days with greater than 4 hours of machine use.  BIPAP 20/15 cm H20     Compliance download report from 1/23/23 to 2/21/23 reviewed today by me and showed patient is using machine 8:48 hrs/night with 97% compliance and AHI 0.2 within this time frame.  97/30days with greater than 4 hours of machine use.  BIPAP 20/15 cm H20 1/23/23    Assessment:       Severe LIANE.  BiPAP 20/15 cm H2O.  Optimal compliance and efficacy on review today.     Snoring- resolved on BIPAP  Witnessed apnea -resolved on BIPAP  Hypersomnia-resolved with BiPAP use.  Asthma, DM, HTN- followed by PCP  Obesity  Sleep maintenance insomnia-psychophysiological hyperarousal, poor sleep hygiene likely contributing        Plan:       Order new BIPAP 21/16 cm H2O  Change current BiPAP to 21/16 cm H2O.  Patient will bring BiPAP to office for pressure adjustment  Discussed severity of sleep apnea and importance of BiPAP use  Advised to use BiPAP 6-8 hrs at night and during naps.  Replacement of mask, tubing, head straps every 3-6 months or sooner if damaged.   Patient instructed to contact [facility] for any mask, tubing or machine trouble shooting if problems arise.  Sleep hygiene  Cognitive behavioral therapy was discussed with patient including stimulus control and sleep restriction  Discussed decrease time in bed, no TV if wakes at night  Avoid sedatives, alcohol and caffeinated drinks at  bed time. No driving motorized vehicles or operating heavy machinery while fatigue, drowsy or sleepy. - patient verbalized understanding and agrees. Weight loss is also recommended as a long-term intervention. Complications of LIANE if not treated were discussed with patient patient to include systemic hypertension, pulmonary hypertension, cardiovascular morbidities, car accidents and all cause mortality.   Patient education  regarding sleep tips and PAP cleaning recommendations

## 2023-02-27 RX ORDER — OMEGA-3-ACID ETHYL ESTERS 1 G/1
CAPSULE, LIQUID FILLED ORAL
Qty: 120 CAPSULE | Refills: 10 | OUTPATIENT
Start: 2023-02-27

## 2023-02-27 RX ORDER — PEN NEEDLE, DIABETIC 31 GX3/16"
NEEDLE, DISPOSABLE MISCELLANEOUS
Refills: 10 | OUTPATIENT
Start: 2023-02-27

## 2023-03-06 RX ORDER — PEN NEEDLE, DIABETIC 31 GX3/16"
NEEDLE, DISPOSABLE MISCELLANEOUS
Refills: 10 | OUTPATIENT
Start: 2023-03-06

## 2023-03-06 RX ORDER — OMEGA-3-ACID ETHYL ESTERS 1 G/1
CAPSULE, LIQUID FILLED ORAL
Qty: 120 CAPSULE | Refills: 10 | OUTPATIENT
Start: 2023-03-06

## 2023-03-09 NOTE — TELEPHONE ENCOUNTER
Spoke to Jennifer Maynard at [facility] and she said that the order has been placed, Wen Teixeira was approved. Typically takes 7 days for machine to ship after order placed (shipped from [facility]) work order was created 3/2/23. Jennifer Maynard said that she anticipates the machine to be shipped tomorrow. Please watch for set up, patient will need a 31-90d.

## 2023-03-13 RX ORDER — OMEGA-3-ACID ETHYL ESTERS 1 G/1
CAPSULE, LIQUID FILLED ORAL
Qty: 120 CAPSULE | Refills: 10 | OUTPATIENT
Start: 2023-03-13

## 2023-03-13 RX ORDER — PEN NEEDLE, DIABETIC 31 GX3/16"
NEEDLE, DISPOSABLE MISCELLANEOUS
Refills: 10 | OUTPATIENT
Start: 2023-03-13

## 2023-03-17 NOTE — TELEPHONE ENCOUNTER
Spoke with pt this morning - he received his machine 2 days ago. Scheduled his 31-90 on 5/17 @ 3:20 and advised pt to bring his BiPAP and power cord to appt.

## 2023-03-27 DIAGNOSIS — E78.5 HYPERLIPIDEMIA, UNSPECIFIED HYPERLIPIDEMIA TYPE: ICD-10-CM

## 2023-03-28 RX ORDER — ATORVASTATIN CALCIUM 40 MG/1
40 TABLET, FILM COATED ORAL DAILY
Qty: 30 TABLET | Refills: 10 | OUTPATIENT
Start: 2023-03-28

## 2023-03-28 RX ORDER — DICLOFENAC SODIUM 75 MG/1
TABLET, DELAYED RELEASE ORAL
Qty: 30 TABLET | Refills: 10 | OUTPATIENT
Start: 2023-03-28

## 2023-03-28 RX ORDER — FENOFIBRATE 160 MG/1
TABLET ORAL
Qty: 30 TABLET | Refills: 10 | OUTPATIENT
Start: 2023-03-28

## 2023-04-02 DIAGNOSIS — E78.5 HYPERLIPIDEMIA, UNSPECIFIED HYPERLIPIDEMIA TYPE: ICD-10-CM

## 2023-04-03 RX ORDER — DICLOFENAC SODIUM 75 MG/1
TABLET, DELAYED RELEASE ORAL
Qty: 30 TABLET | Refills: 10 | OUTPATIENT
Start: 2023-04-03

## 2023-04-03 RX ORDER — ATORVASTATIN CALCIUM 40 MG/1
40 TABLET, FILM COATED ORAL DAILY
Qty: 30 TABLET | Refills: 10 | OUTPATIENT
Start: 2023-04-03

## 2023-04-03 RX ORDER — FENOFIBRATE 160 MG/1
TABLET ORAL
Qty: 30 TABLET | Refills: 10 | OUTPATIENT
Start: 2023-04-03

## 2023-04-09 DIAGNOSIS — E78.5 HYPERLIPIDEMIA, UNSPECIFIED HYPERLIPIDEMIA TYPE: ICD-10-CM

## 2023-04-10 RX ORDER — ATORVASTATIN CALCIUM 40 MG/1
40 TABLET, FILM COATED ORAL DAILY
Qty: 30 TABLET | Refills: 10 | OUTPATIENT
Start: 2023-04-10

## 2023-04-10 RX ORDER — DICLOFENAC SODIUM 75 MG/1
TABLET, DELAYED RELEASE ORAL
Qty: 30 TABLET | Refills: 10 | OUTPATIENT
Start: 2023-04-10

## 2023-04-10 RX ORDER — FENOFIBRATE 160 MG/1
TABLET ORAL
Qty: 30 TABLET | Refills: 10 | OUTPATIENT
Start: 2023-04-10

## 2023-04-26 DIAGNOSIS — G62.9 NEUROPATHY: ICD-10-CM

## 2023-04-27 RX ORDER — GABAPENTIN 400 MG/1
CAPSULE ORAL
Qty: 150 CAPSULE | Refills: 10 | OUTPATIENT
Start: 2023-04-27

## 2023-04-27 NOTE — TELEPHONE ENCOUNTER
Refill Request       Last Seen: Last Seen Department: 3/18/2022  Last Seen by PCP: Visit date not found    Last Written: 08/31/2022        Next Appointment:   Future Appointments   Date Time Provider Lianna Calvo   5/17/2023  3:20 PM Neyda Norton, NICK - CNP [facility]           Requested Prescriptions     Pending Prescriptions Disp Refills    gabapentin (NEURONTIN) 400 MG capsule [Pharmacy Med Name: GABAPENTIN 400 MG CAPS 400 Capsule] 150 capsule 10     Sig: TAKE 2 CAPSULES DAILY ~201 TAKE 1 CAPSULE DAILY ~108Q2 TAKE 2 CAPSULES AT BEDTIME

## 2023-04-27 NOTE — TELEPHONE ENCOUNTER
Pt informed medications could not be refilled until an appt was made, he stated that he was at work and would call back

## 2023-05-02 DIAGNOSIS — G62.9 NEUROPATHY: ICD-10-CM

## 2023-05-03 RX ORDER — GABAPENTIN 400 MG/1
CAPSULE ORAL
Qty: 150 CAPSULE | Refills: 10 | OUTPATIENT
Start: 2023-05-03

## 2023-05-08 DIAGNOSIS — G62.9 NEUROPATHY: ICD-10-CM

## 2023-05-09 RX ORDER — GABAPENTIN 400 MG/1
CAPSULE ORAL
Qty: 150 CAPSULE | Refills: 10 | OUTPATIENT
Start: 2023-05-09

## 2023-05-11 DIAGNOSIS — G62.9 NEUROPATHY: ICD-10-CM

## 2023-05-11 RX ORDER — GABAPENTIN 400 MG/1
CAPSULE ORAL
Qty: 150 CAPSULE | Refills: 10 | OUTPATIENT
Start: 2023-05-11

## 2023-05-17 ENCOUNTER — TELEPHONE (OUTPATIENT)
Dept: PULMONOLOGY | Age: 58
End: 2023-05-17

## 2023-05-23 DIAGNOSIS — G62.9 NEUROPATHY: ICD-10-CM

## 2023-05-23 RX ORDER — GABAPENTIN 400 MG/1
CAPSULE ORAL
Qty: 150 CAPSULE | Refills: 10 | OUTPATIENT
Start: 2023-05-23

## 2023-05-24 DIAGNOSIS — E78.5 HYPERLIPIDEMIA, UNSPECIFIED HYPERLIPIDEMIA TYPE: ICD-10-CM

## 2023-05-24 RX ORDER — FENOFIBRATE 160 MG/1
TABLET ORAL
Qty: 30 TABLET | Refills: 0 | OUTPATIENT
Start: 2023-05-24

## 2023-06-27 RX ORDER — METOPROLOL TARTRATE 50 MG/1
TABLET, FILM COATED ORAL
Qty: 60 TABLET | Refills: 10 | OUTPATIENT
Start: 2023-06-27

## 2023-06-27 RX ORDER — LANCETS 30 GAUGE
EACH MISCELLANEOUS
Qty: 100 EACH | Refills: 10 | OUTPATIENT
Start: 2023-06-27

## 2023-06-29 ENCOUNTER — OFFICE VISIT (OUTPATIENT)
Dept: SLEEP MEDICINE | Age: 58
End: 2023-06-29
Payer: COMMERCIAL

## 2023-06-29 VITALS
HEIGHT: 63 IN | OXYGEN SATURATION: 96 % | SYSTOLIC BLOOD PRESSURE: 130 MMHG | WEIGHT: 251 LBS | HEART RATE: 62 BPM | BODY MASS INDEX: 44.47 KG/M2 | RESPIRATION RATE: 18 BRPM | DIASTOLIC BLOOD PRESSURE: 65 MMHG

## 2023-06-29 DIAGNOSIS — E66.01 OBESITY, MORBID, BMI 40.0-49.9 (HCC): ICD-10-CM

## 2023-06-29 DIAGNOSIS — Z71.89 ENCOUNTER FOR BIPAP USE COUNSELING: ICD-10-CM

## 2023-06-29 DIAGNOSIS — I10 ESSENTIAL HYPERTENSION: ICD-10-CM

## 2023-06-29 DIAGNOSIS — G47.33 SEVERE OBSTRUCTIVE SLEEP APNEA: Primary | ICD-10-CM

## 2023-06-29 DIAGNOSIS — R53.83 OTHER FATIGUE: ICD-10-CM

## 2023-06-29 PROCEDURE — 4004F PT TOBACCO SCREEN RCVD TLK: CPT | Performed by: NURSE PRACTITIONER

## 2023-06-29 PROCEDURE — 3075F SYST BP GE 130 - 139MM HG: CPT | Performed by: NURSE PRACTITIONER

## 2023-06-29 PROCEDURE — 99214 OFFICE O/P EST MOD 30 MIN: CPT | Performed by: NURSE PRACTITIONER

## 2023-06-29 PROCEDURE — G8417 CALC BMI ABV UP PARAM F/U: HCPCS | Performed by: NURSE PRACTITIONER

## 2023-06-29 PROCEDURE — G8427 DOCREV CUR MEDS BY ELIG CLIN: HCPCS | Performed by: NURSE PRACTITIONER

## 2023-06-29 PROCEDURE — 3078F DIAST BP <80 MM HG: CPT | Performed by: NURSE PRACTITIONER

## 2023-06-29 PROCEDURE — 3017F COLORECTAL CA SCREEN DOC REV: CPT | Performed by: NURSE PRACTITIONER

## 2023-06-29 ASSESSMENT — SLEEP AND FATIGUE QUESTIONNAIRES
HOW LIKELY ARE YOU TO NOD OFF OR FALL ASLEEP WHILE LYING DOWN TO REST IN THE AFTERNOON WHEN CIRCUMSTANCES PERMIT: 1
HOW LIKELY ARE YOU TO NOD OFF OR FALL ASLEEP WHILE SITTING QUIETLY AFTER LUNCH WITHOUT ALCOHOL: 0
HOW LIKELY ARE YOU TO NOD OFF OR FALL ASLEEP WHEN YOU ARE A PASSENGER IN A CAR FOR AN HOUR WITHOUT A BREAK: 2
HOW LIKELY ARE YOU TO NOD OFF OR FALL ASLEEP WHILE SITTING INACTIVE IN A PUBLIC PLACE: 1
NECK CIRCUMFERENCE (INCHES): 18
HOW LIKELY ARE YOU TO NOD OFF OR FALL ASLEEP IN A CAR, WHILE STOPPED FOR A FEW MINUTES IN TRAFFIC: 0
ESS TOTAL SCORE: 6
HOW LIKELY ARE YOU TO NOD OFF OR FALL ASLEEP WHILE SITTING AND READING: 1
HOW LIKELY ARE YOU TO NOD OFF OR FALL ASLEEP WHILE SITTING AND TALKING TO SOMEONE: 0
HOW LIKELY ARE YOU TO NOD OFF OR FALL ASLEEP WHILE WATCHING TV: 1

## 2023-07-03 RX ORDER — METOPROLOL TARTRATE 50 MG/1
TABLET, FILM COATED ORAL
Qty: 60 TABLET | Refills: 10 | OUTPATIENT
Start: 2023-07-03

## 2023-07-03 RX ORDER — LANCETS 30 GAUGE
EACH MISCELLANEOUS
Qty: 100 EACH | Refills: 10 | OUTPATIENT
Start: 2023-07-03

## 2023-07-07 RX ORDER — METOPROLOL TARTRATE 50 MG/1
TABLET, FILM COATED ORAL
Qty: 60 TABLET | Refills: 10 | OUTPATIENT
Start: 2023-07-07

## 2023-07-07 RX ORDER — LANCETS 30 GAUGE
EACH MISCELLANEOUS
Qty: 100 EACH | Refills: 10 | OUTPATIENT
Start: 2023-07-07

## 2023-07-13 DIAGNOSIS — G62.9 NEUROPATHY: ICD-10-CM

## 2023-07-13 DIAGNOSIS — E78.5 HYPERLIPIDEMIA, UNSPECIFIED HYPERLIPIDEMIA TYPE: ICD-10-CM

## 2023-07-13 RX ORDER — LANCETS 30 GAUGE
EACH MISCELLANEOUS
Qty: 100 EACH | Refills: 10 | OUTPATIENT
Start: 2023-07-13

## 2023-07-13 RX ORDER — GABAPENTIN 400 MG/1
CAPSULE ORAL
Qty: 120 CAPSULE | Refills: 10 | OUTPATIENT
Start: 2023-07-13

## 2023-07-13 RX ORDER — METOPROLOL TARTRATE 50 MG/1
TABLET, FILM COATED ORAL
Qty: 60 TABLET | Refills: 10 | OUTPATIENT
Start: 2023-07-13

## 2023-07-13 RX ORDER — FENOFIBRATE 160 MG/1
TABLET ORAL
Qty: 30 TABLET | Refills: 10 | OUTPATIENT
Start: 2023-07-13

## 2023-07-19 DIAGNOSIS — G62.9 NEUROPATHY: ICD-10-CM

## 2023-07-19 DIAGNOSIS — E78.5 HYPERLIPIDEMIA, UNSPECIFIED HYPERLIPIDEMIA TYPE: ICD-10-CM

## 2023-07-20 RX ORDER — FENOFIBRATE 160 MG/1
TABLET ORAL
Qty: 30 TABLET | Refills: 10 | OUTPATIENT
Start: 2023-07-20

## 2023-07-20 RX ORDER — GABAPENTIN 400 MG/1
CAPSULE ORAL
Qty: 120 CAPSULE | Refills: 10 | OUTPATIENT
Start: 2023-07-20

## 2023-07-20 NOTE — TELEPHONE ENCOUNTER
Refill Request     Last Seen: Last Seen Department: 3/18/2022  Last Seen by PCP: 3/18/2022    Last Written: 5/19/22        Next Appointment:   Future Appointments   Date Time Provider [address] [place] [address]   6/27/2024 10:00 AM NICK Vo Sa - CNP [facility]       Requested Prescriptions     Pending Prescriptions Disp Refills    fenofibrate (TRIGLIDE) 160 MG tablet [Pharmacy Med Name: FENOFIBRATE 160 MG TABS 160 Tablet] 30 tablet 10     Sig: TAKE 1 TABLET BY MOUTH ONCE DAILY

## 2023-07-25 DIAGNOSIS — G62.9 NEUROPATHY: ICD-10-CM

## 2023-07-25 DIAGNOSIS — E78.5 HYPERLIPIDEMIA, UNSPECIFIED HYPERLIPIDEMIA TYPE: ICD-10-CM

## 2023-07-26 RX ORDER — FENOFIBRATE 160 MG/1
TABLET ORAL
Qty: 30 TABLET | Refills: 10 | OUTPATIENT
Start: 2023-07-26

## 2023-07-26 RX ORDER — GABAPENTIN 400 MG/1
CAPSULE ORAL
Qty: 120 CAPSULE | Refills: 10 | OUTPATIENT
Start: 2023-07-26

## 2023-07-27 RX ORDER — LISINOPRIL 5 MG/1
TABLET ORAL
Qty: 30 TABLET | Refills: 10 | OUTPATIENT
Start: 2023-07-27

## 2023-07-31 DIAGNOSIS — G62.9 NEUROPATHY: ICD-10-CM

## 2023-07-31 DIAGNOSIS — E78.5 HYPERLIPIDEMIA, UNSPECIFIED HYPERLIPIDEMIA TYPE: ICD-10-CM

## 2023-08-01 RX ORDER — GABAPENTIN 400 MG/1
CAPSULE ORAL
Qty: 120 CAPSULE | Refills: 10 | OUTPATIENT
Start: 2023-08-01

## 2023-08-01 RX ORDER — FENOFIBRATE 160 MG/1
TABLET ORAL
Qty: 30 TABLET | Refills: 10 | OUTPATIENT
Start: 2023-08-01

## 2023-08-01 NOTE — TELEPHONE ENCOUNTER
Refill Request     Last Seen: Last Seen Department: 3/18/2022  Last Seen by PCP: 3/18/2022    Last Written: 5/19/22      Next Appointment:   Future Appointments   Date Time Provider [address] [place] [address]   6/27/2024 10:00 AM NIKC Shetty CNP [facility]         Requested Prescriptions     Pending Prescriptions Disp Refills    fenofibrate (TRIGLIDE) 160 MG tablet [Pharmacy Med Name: FENOFIBRATE 160 MG TABS 160 Tablet] 30 tablet 10     Sig: TAKE 1 TABLET BY MOUTH ONCE DAILY

## 2023-08-02 RX ORDER — LISINOPRIL 5 MG/1
TABLET ORAL
Qty: 30 TABLET | Refills: 10 | OUTPATIENT
Start: 2023-08-02

## 2023-08-06 DIAGNOSIS — G62.9 NEUROPATHY: ICD-10-CM

## 2023-08-06 DIAGNOSIS — E78.5 HYPERLIPIDEMIA, UNSPECIFIED HYPERLIPIDEMIA TYPE: ICD-10-CM

## 2023-08-07 RX ORDER — GABAPENTIN 400 MG/1
CAPSULE ORAL
Qty: 120 CAPSULE | Refills: 10 | OUTPATIENT
Start: 2023-08-07

## 2023-08-07 RX ORDER — FENOFIBRATE 160 MG/1
TABLET ORAL
Qty: 30 TABLET | Refills: 10 | OUTPATIENT
Start: 2023-08-07

## 2023-08-07 NOTE — TELEPHONE ENCOUNTER
Refill Request       Last Seen: Last Seen Department: 3/18/2022  Last Seen by PCP: 3/18/2022    Last Written: 05/19/2022      Next Appointment:   Future Appointments   Date Time Provider [address] [place] [address]   6/27/2024 10:00 AM Lion Shahnaz, APRN - CNP [facility]           Requested Prescriptions     Pending Prescriptions Disp Refills    fenofibrate (TRIGLIDE) 160 MG tablet [Pharmacy Med Name: FENOFIBRATE 160 MG TABS 160 Tablet] 30 tablet 10     Sig: TAKE 1 TABLET BY MOUTH ONCE DAILY

## 2023-08-08 RX ORDER — LISINOPRIL 5 MG/1
TABLET ORAL
Qty: 30 TABLET | Refills: 10 | OUTPATIENT
Start: 2023-08-08

## 2023-08-14 RX ORDER — LISINOPRIL 5 MG/1
TABLET ORAL
Qty: 30 TABLET | Refills: 10 | OUTPATIENT
Start: 2023-08-14

## 2023-10-05 ENCOUNTER — NURSE TRIAGE (OUTPATIENT)
Dept: OTHER | Facility: CLINIC | Age: 58
End: 2023-10-05

## 2023-10-05 NOTE — TELEPHONE ENCOUNTER
Location of patient: [place]    Received call from [address] [place] [address] at [facility]; Patient with Red Flag Complaint requesting to establish care with [address]. Subjective: ECC caller reports diarrhea and occasional blood in stool  States some blood streaked in stool and has known hemorrhoids. Current Symptoms: States no diarrhea but stool is not fully formed. Cramping in lower abdomen just before BM. No vomiting. No black or tarry stools. When asked about yellowing of eye he states they are yellow but then states they're not yellow. I clarified this question multiple times with the patient and he states they are not yellow. Onset: 1 week ago; gradual, unchanged    Associated Symptoms: No other symptoms. See above notes    Pain Severity: 4/10; cramping; intermittent, excruciating    Temperature: denies     What has been tried: Unknown    Recommended disposition: See in Office Today or Tomorrow    Care advice provided, patient verbalizes understanding; denies any other questions or concerns; instructed to call back for any new or worsening symptoms. Patient/Caller agrees with recommended disposition; writer provided warm transfer to Denisse Ellington at [facility] for appointment scheduling    Attention Provider: Thank you for allowing me to participate in the care of your patient. The patient was connected to triage in response to information provided to the ECC. Please do not respond through this encounter as the response is not directed to a shared pool. Reason for Disposition   MILD pain (e.g., does not interfere with normal activities) and pain comes and goes (cramps) lasts > 48 hours  (Exception:  This same abdominal pain is a chronic symptom recurrent or ongoing AND present > 4 weeks.)    Protocols used: Abdominal Pain - Male-ADULT-[place]

## 2023-10-23 ENCOUNTER — OFFICE VISIT (OUTPATIENT)
Dept: FAMILY MEDICINE CLINIC | Age: 58
End: 2023-10-23
Payer: COMMERCIAL

## 2023-10-23 VITALS — BODY MASS INDEX: 43.48 KG/M2 | HEIGHT: 63 IN | WEIGHT: 245.4 LBS

## 2023-10-23 DIAGNOSIS — Z12.11 SCREENING FOR COLON CANCER: ICD-10-CM

## 2023-10-23 DIAGNOSIS — E11.69 TYPE 2 DIABETES MELLITUS WITH OTHER SPECIFIED COMPLICATION, WITH LONG-TERM CURRENT USE OF INSULIN (HCC): Primary | ICD-10-CM

## 2023-10-23 DIAGNOSIS — Z76.89 ENCOUNTER TO ESTABLISH CARE: ICD-10-CM

## 2023-10-23 DIAGNOSIS — Z79.4 TYPE 2 DIABETES MELLITUS WITH OTHER SPECIFIED COMPLICATION, WITH LONG-TERM CURRENT USE OF INSULIN (HCC): Primary | ICD-10-CM

## 2023-10-23 DIAGNOSIS — J44.9 CHRONIC OBSTRUCTIVE PULMONARY DISEASE, UNSPECIFIED COPD TYPE (HCC): ICD-10-CM

## 2023-10-23 DIAGNOSIS — E11.42 DIABETIC POLYNEUROPATHY ASSOCIATED WITH TYPE 2 DIABETES MELLITUS (HCC): ICD-10-CM

## 2023-10-23 DIAGNOSIS — E78.5 HYPERLIPIDEMIA, UNSPECIFIED HYPERLIPIDEMIA TYPE: ICD-10-CM

## 2023-10-23 DIAGNOSIS — G62.9 NEUROPATHY: ICD-10-CM

## 2023-10-23 DIAGNOSIS — Z72.0 TOBACCO USE: ICD-10-CM

## 2023-10-23 PROCEDURE — G8482 FLU IMMUNIZE ORDER/ADMIN: HCPCS | Performed by: STUDENT IN AN ORGANIZED HEALTH CARE EDUCATION/TRAINING PROGRAM

## 2023-10-23 PROCEDURE — 4004F PT TOBACCO SCREEN RCVD TLK: CPT | Performed by: STUDENT IN AN ORGANIZED HEALTH CARE EDUCATION/TRAINING PROGRAM

## 2023-10-23 PROCEDURE — 3023F SPIROM DOC REV: CPT | Performed by: STUDENT IN AN ORGANIZED HEALTH CARE EDUCATION/TRAINING PROGRAM

## 2023-10-23 PROCEDURE — G8427 DOCREV CUR MEDS BY ELIG CLIN: HCPCS | Performed by: STUDENT IN AN ORGANIZED HEALTH CARE EDUCATION/TRAINING PROGRAM

## 2023-10-23 PROCEDURE — G8417 CALC BMI ABV UP PARAM F/U: HCPCS | Performed by: STUDENT IN AN ORGANIZED HEALTH CARE EDUCATION/TRAINING PROGRAM

## 2023-10-23 PROCEDURE — 3017F COLORECTAL CA SCREEN DOC REV: CPT | Performed by: STUDENT IN AN ORGANIZED HEALTH CARE EDUCATION/TRAINING PROGRAM

## 2023-10-23 PROCEDURE — 3046F HEMOGLOBIN A1C LEVEL >9.0%: CPT | Performed by: STUDENT IN AN ORGANIZED HEALTH CARE EDUCATION/TRAINING PROGRAM

## 2023-10-23 PROCEDURE — 99214 OFFICE O/P EST MOD 30 MIN: CPT | Performed by: STUDENT IN AN ORGANIZED HEALTH CARE EDUCATION/TRAINING PROGRAM

## 2023-10-23 PROCEDURE — 2022F DILAT RTA XM EVC RTNOPTHY: CPT | Performed by: STUDENT IN AN ORGANIZED HEALTH CARE EDUCATION/TRAINING PROGRAM

## 2023-10-23 PROCEDURE — 90674 CCIIV4 VAC NO PRSV 0.5 ML IM: CPT | Performed by: STUDENT IN AN ORGANIZED HEALTH CARE EDUCATION/TRAINING PROGRAM

## 2023-10-23 RX ORDER — ATORVASTATIN CALCIUM 40 MG/1
40 TABLET, FILM COATED ORAL
Qty: 90 TABLET | Refills: 1 | Status: SHIPPED | OUTPATIENT
Start: 2023-10-23

## 2023-10-23 RX ORDER — GABAPENTIN 400 MG/1
CAPSULE ORAL
Qty: 150 CAPSULE | Refills: 2 | Status: SHIPPED | OUTPATIENT
Start: 2023-10-23 | End: 2024-08-20

## 2023-10-23 RX ORDER — LANCETS 30 GAUGE
1 EACH MISCELLANEOUS 4 TIMES DAILY
Qty: 270 EACH | Refills: 1 | Status: SHIPPED | OUTPATIENT
Start: 2023-10-23

## 2023-10-23 RX ORDER — METOPROLOL TARTRATE 50 MG/1
50 TABLET, FILM COATED ORAL 2 TIMES DAILY
Qty: 180 TABLET | Refills: 1 | Status: SHIPPED | OUTPATIENT
Start: 2023-10-23

## 2023-10-23 RX ORDER — BLOOD-GLUCOSE SENSOR
1 EACH MISCELLANEOUS CONTINUOUS
Qty: 6 EACH | Refills: 1 | Status: SHIPPED | OUTPATIENT
Start: 2023-10-23

## 2023-10-23 RX ORDER — FLUTICASONE FUROATE, UMECLIDINIUM BROMIDE AND VILANTEROL TRIFENATATE 100; 62.5; 25 UG/1; UG/1; UG/1
1 POWDER RESPIRATORY (INHALATION) DAILY
Qty: 1 EACH | Refills: 2 | Status: SHIPPED | OUTPATIENT
Start: 2023-10-23

## 2023-10-23 RX ORDER — FENOFIBRATE 160 MG/1
TABLET ORAL
Qty: 90 TABLET | Refills: 1 | Status: SHIPPED | OUTPATIENT
Start: 2023-10-23

## 2023-10-23 SDOH — ECONOMIC STABILITY: FOOD INSECURITY: WITHIN THE PAST 12 MONTHS, THE FOOD YOU BOUGHT JUST DIDN'T LAST AND YOU DIDN'T HAVE MONEY TO GET MORE.: NEVER TRUE

## 2023-10-23 SDOH — ECONOMIC STABILITY: HOUSING INSECURITY
IN THE LAST 12 MONTHS, WAS THERE A TIME WHEN YOU DID NOT HAVE A STEADY PLACE TO SLEEP OR SLEPT IN A SHELTER (INCLUDING NOW)?: NO

## 2023-10-23 SDOH — ECONOMIC STABILITY: INCOME INSECURITY: HOW HARD IS IT FOR YOU TO PAY FOR THE VERY BASICS LIKE FOOD, HOUSING, MEDICAL CARE, AND HEATING?: SOMEWHAT HARD

## 2023-10-23 SDOH — ECONOMIC STABILITY: FOOD INSECURITY: WITHIN THE PAST 12 MONTHS, YOU WORRIED THAT YOUR FOOD WOULD RUN OUT BEFORE YOU GOT MONEY TO BUY MORE.: NEVER TRUE

## 2023-10-23 ASSESSMENT — PATIENT HEALTH QUESTIONNAIRE - PHQ9
3. TROUBLE FALLING OR STAYING ASLEEP: 2
7. TROUBLE CONCENTRATING ON THINGS, SUCH AS READING THE NEWSPAPER OR WATCHING TELEVISION: 0
SUM OF ALL RESPONSES TO PHQ QUESTIONS 1-9: 8
1. LITTLE INTEREST OR PLEASURE IN DOING THINGS: 2
10. IF YOU CHECKED OFF ANY PROBLEMS, HOW DIFFICULT HAVE THESE PROBLEMS MADE IT FOR YOU TO DO YOUR WORK, TAKE CARE OF THINGS AT HOME, OR GET ALONG WITH OTHER PEOPLE: 0
SUM OF ALL RESPONSES TO PHQ9 QUESTIONS 1 & 2: 4
6. FEELING BAD ABOUT YOURSELF - OR THAT YOU ARE A FAILURE OR HAVE LET YOURSELF OR YOUR FAMILY DOWN: 0
2. FEELING DOWN, DEPRESSED OR HOPELESS: 2
SUM OF ALL RESPONSES TO PHQ QUESTIONS 1-9: 8
SUM OF ALL RESPONSES TO PHQ QUESTIONS 1-9: 8
9. THOUGHTS THAT YOU WOULD BE BETTER OFF DEAD, OR OF HURTING YOURSELF: 0
SUM OF ALL RESPONSES TO PHQ QUESTIONS 1-9: 8
5. POOR APPETITE OR OVEREATING: 0
8. MOVING OR SPEAKING SO SLOWLY THAT OTHER PEOPLE COULD HAVE NOTICED. OR THE OPPOSITE, BEING SO FIGETY OR RESTLESS THAT YOU HAVE BEEN MOVING AROUND A LOT MORE THAN USUAL: 0
4. FEELING TIRED OR HAVING LITTLE ENERGY: 2

## 2023-10-23 ASSESSMENT — ENCOUNTER SYMPTOMS
BLOOD IN STOOL: 1
SHORTNESS OF BREATH: 0
COUGH: 0
RHINORRHEA: 0

## 2023-10-24 ENCOUNTER — NURSE ONLY (OUTPATIENT)
Dept: FAMILY MEDICINE CLINIC | Age: 58
End: 2023-10-24
Payer: COMMERCIAL

## 2023-10-24 DIAGNOSIS — Z76.89 ENCOUNTER TO ESTABLISH CARE: ICD-10-CM

## 2023-10-24 DIAGNOSIS — Z79.4 TYPE 2 DIABETES MELLITUS WITH OTHER SPECIFIED COMPLICATION, WITH LONG-TERM CURRENT USE OF INSULIN (HCC): Primary | ICD-10-CM

## 2023-10-24 DIAGNOSIS — E78.5 HYPERLIPIDEMIA, UNSPECIFIED HYPERLIPIDEMIA TYPE: ICD-10-CM

## 2023-10-24 DIAGNOSIS — E11.69 TYPE 2 DIABETES MELLITUS WITH OTHER SPECIFIED COMPLICATION, WITH LONG-TERM CURRENT USE OF INSULIN (HCC): Primary | ICD-10-CM

## 2023-10-24 LAB
ALBUMIN SERPL-MCNC: 4.2 G/DL (ref 3.4–5)
ALBUMIN/GLOB SERPL: 2.1 {RATIO} (ref 1.1–2.2)
ALP SERPL-CCNC: 94 U/L (ref 40–129)
ALT SERPL-CCNC: 47 U/L (ref 10–40)
ANION GAP SERPL CALCULATED.3IONS-SCNC: 17 MMOL/L (ref 3–16)
AST SERPL-CCNC: 41 U/L (ref 15–37)
BASOPHILS # BLD: 0.1 K/UL (ref 0–0.2)
BASOPHILS NFR BLD: 1 %
BILIRUB SERPL-MCNC: 0.5 MG/DL (ref 0–1)
BUN SERPL-MCNC: 13 MG/DL (ref 7–20)
CALCIUM SERPL-MCNC: 8.9 MG/DL (ref 8.3–10.6)
CHLORIDE SERPL-SCNC: 91 MMOL/L (ref 99–110)
CHOLEST SERPL-MCNC: 192 MG/DL (ref 0–199)
CO2 SERPL-SCNC: 25 MMOL/L (ref 21–32)
CREAT SERPL-MCNC: 1 MG/DL (ref 0.9–1.3)
DEPRECATED RDW RBC AUTO: 12.6 % (ref 12.4–15.4)
EOSINOPHIL # BLD: 0.2 K/UL (ref 0–0.6)
EOSINOPHIL NFR BLD: 2.6 %
GFR SERPLBLD CREATININE-BSD FMLA CKD-EPI: >60 ML/MIN/{1.73_M2}
GLUCOSE SERPL-MCNC: 168 MG/DL (ref 70–99)
HCT VFR BLD AUTO: 41.4 % (ref 40.5–52.5)
HDLC SERPL-MCNC: 28 MG/DL (ref 40–60)
HGB BLD-MCNC: 14.6 G/DL (ref 13.5–17.5)
LDLC SERPL CALC-MCNC: ABNORMAL MG/DL
LDLC SERPL-MCNC: 34 MG/DL
LYMPHOCYTES # BLD: 1.6 K/UL (ref 1–5.1)
LYMPHOCYTES NFR BLD: 26.2 %
MCH RBC QN AUTO: 32.2 PG (ref 26–34)
MCHC RBC AUTO-ENTMCNC: 35.1 G/DL (ref 31–36)
MCV RBC AUTO: 91.7 FL (ref 80–100)
MONOCYTES # BLD: 0.5 K/UL (ref 0–1.3)
MONOCYTES NFR BLD: 7.6 %
NEUTROPHILS # BLD: 3.9 K/UL (ref 1.7–7.7)
NEUTROPHILS NFR BLD: 62.6 %
PLATELET # BLD AUTO: 132 K/UL (ref 135–450)
PMV BLD AUTO: 10.8 FL (ref 5–10.5)
POTASSIUM SERPL-SCNC: 4.2 MMOL/L (ref 3.5–5.1)
PROT SERPL-MCNC: 6.2 G/DL (ref 6.4–8.2)
RBC # BLD AUTO: 4.51 M/UL (ref 4.2–5.9)
SODIUM SERPL-SCNC: 133 MMOL/L (ref 136–145)
TRIGL SERPL-MCNC: 820 MG/DL (ref 0–150)
VLDLC SERPL CALC-MCNC: ABNORMAL MG/DL
WBC # BLD AUTO: 6.2 K/UL (ref 4–11)

## 2023-10-24 PROCEDURE — 36415 COLL VENOUS BLD VENIPUNCTURE: CPT | Performed by: STUDENT IN AN ORGANIZED HEALTH CARE EDUCATION/TRAINING PROGRAM

## 2023-10-25 LAB
EST. AVERAGE GLUCOSE BLD GHB EST-MCNC: 177.2 MG/DL
HBA1C MFR BLD: 7.8 %

## 2023-10-25 RX ORDER — ICOSAPENT ETHYL 1000 MG/1
2 CAPSULE ORAL 2 TIMES DAILY
Qty: 120 CAPSULE | Refills: 3 | Status: SHIPPED | OUTPATIENT
Start: 2023-10-25

## 2023-11-01 ENCOUNTER — TELEPHONE (OUTPATIENT)
Dept: FAMILY MEDICINE CLINIC | Age: 58
End: 2023-11-01

## 2023-11-01 NOTE — TELEPHONE ENCOUNTER
Submitted PA for Vascepa 1GM capsules  [facility] Key: [id number] STATUS: PENDING. Follow up done daily; if no response in three days we will refax for status check. If another three days goes by with no response we will call the insurance for status.

## 2023-11-02 NOTE — TELEPHONE ENCOUNTER
APPROVAL for Vascepa 1GM capsules 11/01/23-01/23/24; letter attached. If this requires a response please respond to the pool ( P MHCX [address]). Thank you please advise patient.

## 2023-11-03 ENCOUNTER — TELEPHONE (OUTPATIENT)
Dept: ADMINISTRATIVE | Age: 58
End: 2023-11-03

## 2023-11-03 NOTE — TELEPHONE ENCOUNTER
APPROVAL for Ambar Singletary 100-62.5-25MCG/ACT aerosol powder 11/03/23-11/01/24; letter attached.    If this requires a response please respond to the pool ( P MHCX PSC MEDICATION PRE-AUTH).      Thank you please advise patient.

## 2023-11-03 NOTE — TELEPHONE ENCOUNTER
Submitted PA for Ambar Singletary 100-62.5-25MCG/ACT aerosol powder  Via [facility] Key: [id number] STATUS: PENDING.    Follow up done daily; if no response in three days we will refax for status check.  If another three days goes by with no response we will call the insurance for status.

## 2023-11-16 ENCOUNTER — HOSPITAL ENCOUNTER (OUTPATIENT)
Dept: CT IMAGING | Age: 58
Discharge: HOME | End: 2023-11-16
Attending: STUDENT IN AN ORGANIZED HEALTH CARE EDUCATION/TRAINING PROGRAM
Payer: COMMERCIAL

## 2023-11-16 DIAGNOSIS — Z72.0 TOBACCO USE: ICD-10-CM

## 2023-11-16 PROCEDURE — 71271 CT THORAX LUNG CANCER SCR C-: CPT

## 2023-11-30 ENCOUNTER — OFFICE VISIT (OUTPATIENT)
Dept: FAMILY MEDICINE CLINIC | Age: 58
End: 2023-11-30
Payer: COMMERCIAL

## 2023-11-30 VITALS
DIASTOLIC BLOOD PRESSURE: 82 MMHG | OXYGEN SATURATION: 96 % | WEIGHT: 244 LBS | HEART RATE: 70 BPM | SYSTOLIC BLOOD PRESSURE: 124 MMHG | BODY MASS INDEX: 43.22 KG/M2

## 2023-11-30 DIAGNOSIS — F41.1 GENERALIZED ANXIETY DISORDER: ICD-10-CM

## 2023-11-30 DIAGNOSIS — E11.69 TYPE 2 DIABETES MELLITUS WITH OTHER SPECIFIED COMPLICATION, WITH LONG-TERM CURRENT USE OF INSULIN (HCC): Primary | ICD-10-CM

## 2023-11-30 DIAGNOSIS — I10 ESSENTIAL HYPERTENSION: ICD-10-CM

## 2023-11-30 DIAGNOSIS — Z79.4 TYPE 2 DIABETES MELLITUS WITH OTHER SPECIFIED COMPLICATION, WITH LONG-TERM CURRENT USE OF INSULIN (HCC): Primary | ICD-10-CM

## 2023-11-30 DIAGNOSIS — F17.210 CIGARETTE NICOTINE DEPENDENCE WITHOUT COMPLICATION: ICD-10-CM

## 2023-11-30 LAB
ALBUMIN SERPL-MCNC: 4.5 G/DL (ref 3.4–5)
ALBUMIN/GLOB SERPL: 2 {RATIO} (ref 1.1–2.2)
ALP SERPL-CCNC: 101 U/L (ref 40–129)
ALT SERPL-CCNC: 40 U/L (ref 10–40)
ANION GAP SERPL CALCULATED.3IONS-SCNC: 12 MMOL/L (ref 3–16)
AST SERPL-CCNC: 31 U/L (ref 15–37)
BASOPHILS # BLD: 0.2 K/UL (ref 0–0.2)
BASOPHILS NFR BLD: 1.9 %
BILIRUB SERPL-MCNC: 0.4 MG/DL (ref 0–1)
BUN SERPL-MCNC: 11 MG/DL (ref 7–20)
CALCIUM SERPL-MCNC: 9.4 MG/DL (ref 8.3–10.6)
CHLORIDE SERPL-SCNC: 99 MMOL/L (ref 99–110)
CHOLEST SERPL-MCNC: 104 MG/DL (ref 0–199)
CO2 SERPL-SCNC: 28 MMOL/L (ref 21–32)
CREAT SERPL-MCNC: 0.8 MG/DL (ref 0.9–1.3)
DEPRECATED RDW RBC AUTO: 12.9 % (ref 12.4–15.4)
EOSINOPHIL # BLD: 0.1 K/UL (ref 0–0.6)
EOSINOPHIL NFR BLD: 1.4 %
GFR SERPLBLD CREATININE-BSD FMLA CKD-EPI: >60 ML/MIN/{1.73_M2}
GLUCOSE SERPL-MCNC: 186 MG/DL (ref 70–99)
HCT VFR BLD AUTO: 43.2 % (ref 40.5–52.5)
HDLC SERPL-MCNC: 36 MG/DL (ref 40–60)
HGB BLD-MCNC: 14.9 G/DL (ref 13.5–17.5)
LDLC SERPL CALC-MCNC: 27 MG/DL
LYMPHOCYTES # BLD: 1.8 K/UL (ref 1–5.1)
LYMPHOCYTES NFR BLD: 21.6 %
MCH RBC QN AUTO: 32.4 PG (ref 26–34)
MCHC RBC AUTO-ENTMCNC: 34.4 G/DL (ref 31–36)
MCV RBC AUTO: 94.2 FL (ref 80–100)
MONOCYTES # BLD: 0.5 K/UL (ref 0–1.3)
MONOCYTES NFR BLD: 5.5 %
NEUTROPHILS # BLD: 6 K/UL (ref 1.7–7.7)
NEUTROPHILS NFR BLD: 69.6 %
PLATELET # BLD AUTO: 170 K/UL (ref 135–450)
PMV BLD AUTO: 9.9 FL (ref 5–10.5)
POTASSIUM SERPL-SCNC: 4.3 MMOL/L (ref 3.5–5.1)
PROT SERPL-MCNC: 6.7 G/DL (ref 6.4–8.2)
RBC # BLD AUTO: 4.59 M/UL (ref 4.2–5.9)
SODIUM SERPL-SCNC: 139 MMOL/L (ref 136–145)
TRIGL SERPL-MCNC: 207 MG/DL (ref 0–150)
VLDLC SERPL CALC-MCNC: 41 MG/DL
WBC # BLD AUTO: 8.6 K/UL (ref 4–11)

## 2023-11-30 PROCEDURE — 99406 BEHAV CHNG SMOKING 3-10 MIN: CPT | Performed by: STUDENT IN AN ORGANIZED HEALTH CARE EDUCATION/TRAINING PROGRAM

## 2023-11-30 PROCEDURE — 3017F COLORECTAL CA SCREEN DOC REV: CPT | Performed by: STUDENT IN AN ORGANIZED HEALTH CARE EDUCATION/TRAINING PROGRAM

## 2023-11-30 PROCEDURE — 2022F DILAT RTA XM EVC RTNOPTHY: CPT | Performed by: STUDENT IN AN ORGANIZED HEALTH CARE EDUCATION/TRAINING PROGRAM

## 2023-11-30 PROCEDURE — G8482 FLU IMMUNIZE ORDER/ADMIN: HCPCS | Performed by: STUDENT IN AN ORGANIZED HEALTH CARE EDUCATION/TRAINING PROGRAM

## 2023-11-30 PROCEDURE — 99214 OFFICE O/P EST MOD 30 MIN: CPT | Performed by: STUDENT IN AN ORGANIZED HEALTH CARE EDUCATION/TRAINING PROGRAM

## 2023-11-30 PROCEDURE — 3074F SYST BP LT 130 MM HG: CPT | Performed by: STUDENT IN AN ORGANIZED HEALTH CARE EDUCATION/TRAINING PROGRAM

## 2023-11-30 PROCEDURE — 4004F PT TOBACCO SCREEN RCVD TLK: CPT | Performed by: STUDENT IN AN ORGANIZED HEALTH CARE EDUCATION/TRAINING PROGRAM

## 2023-11-30 PROCEDURE — G8427 DOCREV CUR MEDS BY ELIG CLIN: HCPCS | Performed by: STUDENT IN AN ORGANIZED HEALTH CARE EDUCATION/TRAINING PROGRAM

## 2023-11-30 PROCEDURE — 36415 COLL VENOUS BLD VENIPUNCTURE: CPT | Performed by: STUDENT IN AN ORGANIZED HEALTH CARE EDUCATION/TRAINING PROGRAM

## 2023-11-30 PROCEDURE — 3079F DIAST BP 80-89 MM HG: CPT | Performed by: STUDENT IN AN ORGANIZED HEALTH CARE EDUCATION/TRAINING PROGRAM

## 2023-11-30 PROCEDURE — G8417 CALC BMI ABV UP PARAM F/U: HCPCS | Performed by: STUDENT IN AN ORGANIZED HEALTH CARE EDUCATION/TRAINING PROGRAM

## 2023-11-30 PROCEDURE — 3051F HG A1C>EQUAL 7.0%<8.0%: CPT | Performed by: STUDENT IN AN ORGANIZED HEALTH CARE EDUCATION/TRAINING PROGRAM

## 2023-11-30 RX ORDER — FLASH GLUCOSE SCANNING READER
1 EACH MISCELLANEOUS CONTINUOUS
Qty: 1 EACH | Refills: 0 | Status: SHIPPED | OUTPATIENT
Start: 2023-11-30

## 2023-11-30 RX ORDER — BUPROPION HYDROCHLORIDE 150 MG/1
TABLET ORAL
Qty: 60 TABLET | Refills: 0 | Status: SHIPPED | OUTPATIENT
Start: 2023-11-30

## 2023-11-30 ASSESSMENT — ENCOUNTER SYMPTOMS
SHORTNESS OF BREATH: 0
RHINORRHEA: 0
BLOOD IN STOOL: 0
COUGH: 0

## 2023-11-30 NOTE — PROGRESS NOTES
[address] [place] [address]  2023    Abilio Gould (:  1965) is a 62 y.o. male, here for evaluation of the following medical concerns:    Chief Complaint   Patient presents with    Diabetes     1 month f/u         ASSESSMENT/ PLAN  1. Type 2 diabetes mellitus with other specified complication, with long-term current use of insulin ([address])  -Start Mounjaro and follow-up in 3 months. Decrease insulin by 2 units every 2 days that morning blood sugars less than 100. - Tirzepatide (MOUNJARO) 2.5 MG/0.5ML SOPN SC injection; Inject 0.5 mLs into the skin once a week  Dispense: 2 mL; Refill: 0  - CBC with Auto Differential; Future  - Comprehensive Metabolic Panel; Future  - Comprehensive Metabolic Panel  - CBC with Auto Differential  - LIPID PANEL; Future  - LIPID PANEL    2. Essential hypertension  -Blood pressure 124/82 today. Continue antihypertensives. - CBC with Auto Differential; Future  - Comprehensive Metabolic Panel; Future  - Comprehensive Metabolic Panel  - CBC with Auto Differential    3. Cigarette nicotine dependence without complication  - Start Wellbutrin and follow-up in 1 month    4.  Generalized anxiety disorder  - Start Wellbutrin and follow-up in 1 month. Return in about 1 month (around 2023). HPI  Patient is here for follow-up on diabetes. He is planning on scheduling with podiatry to update his foot exam.  His blood sugars have been averaging between 110 and 140. He tried Ozempic previously but had side effects. He has not tried Contrave but would be willing to try and help with weight loss. He has never had a history of pancreatitis. No family history of thyroid cancer. No diabetic retinopathy. He is taking his medication without side effects. Overall his mood is good he does have some anxiety. He is still smoking. He has not ever tried Wellbutrin. He has never had a seizure. ROS  Review of Systems   Constitutional:  Negative for chills and fever.

## 2023-12-11 ENCOUNTER — TELEPHONE (OUTPATIENT)
Dept: FAMILY MEDICINE CLINIC | Age: 58
End: 2023-12-11

## 2023-12-11 DIAGNOSIS — E11.69 TYPE 2 DIABETES MELLITUS WITH OTHER SPECIFIED COMPLICATION, WITH LONG-TERM CURRENT USE OF INSULIN (HCC): Primary | ICD-10-CM

## 2023-12-11 DIAGNOSIS — Z79.4 TYPE 2 DIABETES MELLITUS WITH OTHER SPECIFIED COMPLICATION, WITH LONG-TERM CURRENT USE OF INSULIN (HCC): Primary | ICD-10-CM

## 2023-12-11 RX ORDER — BLOOD-GLUCOSE SENSOR
1 EACH MISCELLANEOUS CONTINUOUS
Qty: 6 EACH | Refills: 1 | Status: SHIPPED | OUTPATIENT
Start: 2023-12-11

## 2023-12-11 RX ORDER — FLASH GLUCOSE SCANNING READER
1 EACH MISCELLANEOUS CONTINUOUS
Qty: 1 EACH | Refills: 0 | Status: SHIPPED | OUTPATIENT
Start: 2023-12-11

## 2023-12-11 NOTE — TELEPHONE ENCOUNTER
Fax received from [employer] [place] stating that they are unable to supply the [facility] reader device

## 2023-12-11 NOTE — TELEPHONE ENCOUNTER
I called and spoke with the pharmacy they do not carry the [facility], pt would like for it to be sent to [facility]

## 2023-12-13 ENCOUNTER — TELEPHONE (OUTPATIENT)
Dept: ADMINISTRATIVE | Age: 58
End: 2023-12-13

## 2023-12-13 NOTE — TELEPHONE ENCOUNTER
Submitted PA for Mounjaro 2.5MG/0.5ML pen-injectors  [facility]ia [facility] Key: [id number] STATUS: PENDING.    Follow up done daily; if no response in three days we will refax for status check.  If another three days goes by with no response we will call the insurance for status.

## 2023-12-14 RX ORDER — LIRAGLUTIDE 6 MG/ML
0.6 INJECTION SUBCUTANEOUS DAILY
Qty: 2 ADJUSTABLE DOSE PRE-FILLED PEN SYRINGE | Refills: 0 | Status: SHIPPED | OUTPATIENT
Start: 2023-12-14

## 2023-12-14 NOTE — TELEPHONE ENCOUNTER
DENIAL for Mounjaro 2.5MG/0.5ML pen-injectors; letter attached.    If this requires a response please respond to the pool ( P MHCX PSC MEDICATION PRE-AUTH).      Thank you please advise patient.

## 2023-12-22 DIAGNOSIS — G62.9 NEUROPATHY: ICD-10-CM

## 2023-12-26 RX ORDER — FLUTICASONE FUROATE, UMECLIDINIUM BROMIDE AND VILANTEROL TRIFENATATE 100; 62.5; 25 UG/1; UG/1; UG/1
POWDER RESPIRATORY (INHALATION)
Qty: 1 EACH | Refills: 0 | Status: SHIPPED | OUTPATIENT
Start: 2023-12-26

## 2023-12-26 RX ORDER — GABAPENTIN 400 MG/1
CAPSULE ORAL
Qty: 150 CAPSULE | Refills: 0 | Status: SHIPPED | OUTPATIENT
Start: 2023-12-26 | End: 2024-01-26

## 2023-12-26 NOTE — TELEPHONE ENCOUNTER
Refill Request     Last Seen: Last Seen Department: 11/30/2023  Last Seen by PCP: 11/30/2023    Last Written: 10/23/23      Next Appointment:   Future Appointments   Date Time Provider [address] [place] [address]   1/5/2024  7:30 AM Anoop, [facility]   1/23/2024  7:30 AM Anoop, [address] - [place]   6/27/2024 10:00 AM NICK Ballard - CNP [facility]           Requested Prescriptions     Pending Prescriptions Disp Refills    gabapentin (NEURONTIN) 400 MG capsule [Pharmacy Med Name: GABAPENTIN 400 MG CAPS 400 Capsule] 150 capsule 10     Sig: TAKE 2 CAPSULES BY MOUTH EVERY MORNING, TAKE 1 CAPSULE AT LUNCH AND TAKE 2 CAPSULES AT BEDTIME

## 2023-12-26 NOTE — TELEPHONE ENCOUNTER
Refill Request     Last Seen: Last Seen Department: 11/30/2023  Last Seen by PCP: 11/30/2023    Last Written: 10/23/23      Next Appointment:   Future Appointments   Date Time Provider [address]   1/5/2024  7:30 AM Early Branch, [address] [place] [address]   1/23/2024  7:30 AM [address]   6/27/2024 10:00 AM NICK Hicks CNP [facility]           Requested Prescriptions     Pending Prescriptions Disp Refills    TRELEGY ELLIPTA 100-62.5-25 MCG/ACT AEPB inhaler [Pharmacy Med Name: Gertrudis Grace 100-62.5-25 100-62.5-25 Aerosol]  10     Sig: INHALE ONE (1) PUFF BY MOUTH DAILY

## 2023-12-28 RX ORDER — LIRAGLUTIDE 6 MG/ML
INJECTION SUBCUTANEOUS
Qty: 6 ML | Refills: 10 | Status: SHIPPED | OUTPATIENT
Start: 2023-12-28

## 2023-12-28 NOTE — TELEPHONE ENCOUNTER
Refill Request     Last Seen: Last Seen Department: 11/30/2023  Last Seen by PCP: 11/30/2023    Last Written: 12/14/23      Next Appointment:   Future Appointments   Date Time Provider [facility] [address]   1/5/2024  7:30 AM Davalos, [address] [place] [address]   1/23/2024  7:30 AM Kannan Castillo DO [facility] - [place]   6/27/2024 10:00 AM NICK Ballard - CNP [facility]           Requested Prescriptions     Pending Prescriptions Disp Refills    VICTOZA 18 MG/3ML SOPN SC injection [Pharmacy Med Name: Hyacinth Perez 2-LINDA 6ML 18MG/3ML 0.6 MG/0.1 INSU] 6 mL 10     Sig: INJECT 0.6MG SUBCUTANEOUSLY DAILY

## 2023-12-29 RX ORDER — BUPROPION HYDROCHLORIDE 300 MG/1
300 TABLET ORAL EVERY MORNING
Qty: 30 TABLET | Refills: 0 | Status: SHIPPED | OUTPATIENT
Start: 2023-12-29 | End: 2024-01-28

## 2023-12-29 NOTE — TELEPHONE ENCOUNTER
Refill Request       Last Seen: Last Seen Department: 11/30/2023  Last Seen by PCP: 11/30/2023    Last Written: 111/30/2023        Next Appointment:   Future Appointments   Date Time Provider [facility] [address]   1/5/2024  7:30 AM Anoop, [address] [facility]   1/23/2024  7:30 AM Anoop, [address] - [place]   6/27/2024 10:00 AM Velma Kennedy APRN - CNP [facility]         Requested Prescriptions     Pending Prescriptions Disp Refills    buPROPion (WELLBUTRIN XL) 150 MG extended release tablet [Pharmacy Med Name: BUPROPION ER 150MG  Tablet] 60 tablet 10     Sig: TAKE 1 TABLET BY MOUTH DAILY FOR 3 DAYS THEN 2 TABLETS BY MOUTH DAILY

## 2024-01-02 ENCOUNTER — TELEPHONE (OUTPATIENT)
Dept: FAMILY MEDICINE CLINIC | Age: 59
End: 2024-01-02

## 2024-01-02 NOTE — TELEPHONE ENCOUNTER
Patients pharmacy called and stated that the patient will need a medication prescribed because the Levemir- flex touch has been discontinued by the .

## 2024-01-03 RX ORDER — LIRAGLUTIDE 6 MG/ML
INJECTION SUBCUTANEOUS
Qty: 6 ML | Refills: 10 | OUTPATIENT
Start: 2024-01-03

## 2024-01-04 RX ORDER — BUPROPION HYDROCHLORIDE 150 MG/1
TABLET ORAL
Qty: 60 TABLET | Refills: 10 | OUTPATIENT
Start: 2024-01-04

## 2024-01-11 RX ORDER — LIRAGLUTIDE 6 MG/ML
INJECTION SUBCUTANEOUS
Qty: 6 ML | Refills: 10 | Status: SHIPPED | OUTPATIENT
Start: 2024-01-11

## 2024-01-11 RX ORDER — BUPROPION HYDROCHLORIDE 150 MG/1
TABLET ORAL
Qty: 60 TABLET | Refills: 10 | Status: SHIPPED | OUTPATIENT
Start: 2024-01-11

## 2024-01-11 NOTE — TELEPHONE ENCOUNTER
Refill Request     Last Seen: Last Seen Department: 11/30/2023  Last Seen by PCP: 11/30/2023    Last Written: 12/28/2023      Next Appointment:   Future Appointments   Date Time Provider Department Center   1/23/2024  7:30 AM Ajit Davalos DO [place] - [place]   6/27/2024 10:00 AM Brooke Oneal APRN - CNP [facility]         Requested Prescriptions     Pending Prescriptions Disp Refills    buPROPion (WELLBUTRIN XL) 150 MG extended release tablet [Pharmacy Med Name: BUPROPION ER 150MG  Tablet] 60 tablet 10     Sig: TAKE 1 TABLET BY MOUTH DAILY FOR 3 DAYS THEN 2 TABLETS BY MOUTH DAILY    VICTOZA 18 MG/3ML SOPN SC injection [Pharmacy Med Name: VICTOZA 2-LINDA 6ML 18MG/3ML 0.6 MG/0.1 INSU] 6 mL 10     Sig: INJECT 0.6MG SUBCUTANEOUSLY DAILY

## 2024-01-20 NOTE — TELEPHONE ENCOUNTER
Refill Request FLUTICASONE 50MCG NASAL SPR 50 ISA    Last Seen: 8/25/20    Last Written: 8/7/20 3 bottles with 1 refill    Next Appointment: left message for patient to call to schedule appointment  Future Appointments   Date Time Provider Lianna Calvo   2/23/2021  1:40 PM NICK Matta - CNP [facility]           Requested Prescriptions     Pending Prescriptions Disp Refills    fluticasone (FLONASE) 50 MCG/ACT nasal spray [Pharmacy Med Name: FLUTICASONE 50MCG NASAL SPR 50 ISA] 16 g 10     Sig: INSTILL ONE (1) SPRAY IN EACH NOSTRIL DAILY
show

## 2024-01-22 NOTE — PROGRESS NOTES
From: Frank Cardona  To: Dr. Melisa Curran  Sent: 1/22/2024 11:11 AM EST  Subject: Referal    DR. Curran, correct me f I am wrong, but I thought you where going to get me a referal to see a dermatologist for the growth on my arm. I have not heard from one yet.   Regards,  Frank Cardona   Subjective:      Patient ID: Kimberly Carrasquillo is a 64 y.o. male. HPI  DM type 2, uncontrolled, with neuropathy ([facility] [address]  Sugars are , diet is fair, weight is stable up 9 lbs since last visit, no change in previously reported moderate neuropathy of feet, no change in vision, no claudication, no foot ulcers, no new skin lesions. No change in medications(Basiglar and Actos). No c/o with meds. Last eye exam 8/2020? was good. Essential hypertension  No change in meds, no c/o with meds, no chest pain, SOB, palpatations, or syncope. Home bp is 110-120s/70-80s. Cigarette smoker  Still smoking down from 1/2 pk to 2-4 cig/d. Trying to quit. Had severe URI for ~1 mo. [facility] Still cough, wheezing, Rhinitis. Moderate persistent asthma with acute exacerbation  Now using Symbicort and Spiriva. cough and congestion much improved. Still smoking. Pure hypercholesterolemia  Wt way up since last visit. Diet fair. No c/o w/ meds. Not taking statin. Severe obstructive sleep apnea  Seen by Sleep lab last yr may have issue w/ pressure. . Observed apnea. Sleep test abnormal. Doing well w/ BI-pap. Class 3 severe obesity in adult ([facility]  Wt is up 9 lbs in last 6 mo      Review of Systems   Constitutional: Positive for fatigue. Eyes: Negative. Respiratory: Negative. Cardiovascular: Negative. Gastrointestinal: Negative. Endocrine: Negative. Genitourinary: Negative. Negative for decreased urine volume. Musculoskeletal: Negative for arthralgias, back pain, gait problem, joint swelling, myalgias, neck pain and neck stiffness. Allergic/Immunologic: Negative. Neurological: Negative. Hematological: Negative. Psychiatric/Behavioral: Negative.       Vitals:    02/19/21 0806 02/19/21 0831   BP: 122/76 108/68   Pulse: 78    Resp: 18    Temp: 97.2 °F (36.2 °C)    SpO2: 98%    Weight: 243 lb 3.2 oz (110.3 kg) Height: 5' 2\" (1.575 m)      Objective:   Physical Exam  Constitutional:       General: He is not in acute distress. Appearance: Normal appearance. He is well-developed. He is obese. He is not ill-appearing, toxic-appearing or diaphoretic. HENT:      Head: Normocephalic and atraumatic. Eyes:      General: No scleral icterus. Right eye: No discharge. Left eye: No discharge. Extraocular Movements: Extraocular movements intact. Conjunctiva/sclera: Conjunctivae normal.      Pupils: Pupils are equal, round, and reactive to light. Neck:      Musculoskeletal: Full passive range of motion without pain, normal range of motion and neck supple. No neck rigidity or muscular tenderness. Thyroid: No thyroid mass or thyromegaly. Vascular: Normal carotid pulses. No carotid bruit, hepatojugular reflux or JVD. Trachea: Trachea and phonation normal.   Cardiovascular:      Rate and Rhythm: Normal rate and regular rhythm. No extrasystoles are present. Chest Wall: PMI is not displaced. Pulses: Normal pulses. No decreased pulses. Carotid pulses are 2+ on the right side and 2+ on the left side. Radial pulses are 2+ on the right side and 2+ on the left side. Femoral pulses are 2+ on the right side and 2+ on the left side. Popliteal pulses are 2+ on the right side and 2+ on the left side. Dorsalis pedis pulses are 2+ on the right side and 2+ on the left side. Posterior tibial pulses are 2+ on the right side and 2+ on the left side. Heart sounds: Normal heart sounds. No murmur. No friction rub. No gallop. Pulmonary:      Effort: Pulmonary effort is normal. No tachypnea, bradypnea, accessory muscle usage or respiratory distress. Breath sounds: Normal breath sounds. No decreased breath sounds, wheezing, rhonchi or rales. Chest:      Chest wall: No tenderness.    Abdominal: Hernia: No hernia is present. There is no hernia in the ventral area. Musculoskeletal:      Right lower leg: No edema. Left lower leg: No edema. Lymphadenopathy:      Cervical: No cervical adenopathy. Skin:     General: Skin is warm and dry. Capillary Refill: Capillary refill takes less than 2 seconds. Coloration: Skin is not jaundiced or pale. Findings: No abrasion, bruising, erythema, lesion or rash. Nails: There is no clubbing. Neurological:      General: No focal deficit present. Mental Status: He is alert and oriented to person, place, and time. Mental status is at baseline. He is not disoriented. Cranial Nerves: No cranial nerve deficit. Sensory: No sensory deficit. Motor: No weakness, tremor, atrophy or abnormal muscle tone. Coordination: Coordination normal.      Gait: Gait normal.      Deep Tendon Reflexes: Reflexes normal.      Comments: Diabetic foot exam was normal with intact light touch and vibratory senses. Psychiatric:         Mood and Affect: Mood normal.         Speech: Speech normal.         Behavior: Behavior normal.         Thought Content: Thought content normal.         Judgment: Judgment normal.         Assessment / Plan:      Severe Obstructive Sleep Apnea. Continue to use Bi-pap. Call if new c/o. To Sleep clinic as scheduled. Class 3 Severe Obesity in Adult (Hcc). Discussed options. Must improve diet and lose weight. Goal w/ DM is to get to<200 lbs. Pure Hypercholesterolemia. Will begin Atorvastatin 20 mg daily. Will rechx L/L w/ next visit in 3 months. Cigarette Smoker. MUST STOP!!! Down to 2-4 cig/d. Use gum. Moderate Persistent Asthma With Acute Exacerbation. Continue Symbicort and spiriva. Must stop smoking. DM Type 2, Uncontrolled, With Neuropathy (Hcc). Continue present meds but must improve diet and lose weight. Essential Hypertension. Continue present meds. Home BP checks. Call if>140/90. Improve diet. Avoid caffeine and salt. Call if new c/o w/ meds.

## 2024-01-23 DIAGNOSIS — G62.9 NEUROPATHY: ICD-10-CM

## 2024-01-24 RX ORDER — GABAPENTIN 400 MG/1
CAPSULE ORAL
Qty: 150 CAPSULE | Refills: 2 | Status: SHIPPED | OUTPATIENT
Start: 2024-01-24 | End: 2024-04-23

## 2024-01-24 RX ORDER — FLUTICASONE FUROATE, UMECLIDINIUM BROMIDE AND VILANTEROL TRIFENATATE 100; 62.5; 25 UG/1; UG/1; UG/1
POWDER RESPIRATORY (INHALATION)
Qty: 1 EACH | Refills: 10 | Status: SHIPPED | OUTPATIENT
Start: 2024-01-24

## 2024-01-24 NOTE — TELEPHONE ENCOUNTER
Refill Request     Last Seen: Last Seen Department: 11/30/2023  Last Seen by PCP: Visit date not found    Last Written: 01/23/2024      Next Appointment:   Future Appointments   Date Time Provider Department Center   2/1/2024  3:00 PM Ajit Davalos DO [place] [facility]   6/27/2024 10:00 AM Brooke Oneal, APRN - CNP [facility]           Requested Prescriptions     Pending Prescriptions Disp Refills    TRELEGY ELLIPTA 100-62.5-25 MCG/ACT AEPB inhaler [Pharmacy Med Name: TRELEGY ELLIPTA 100-62.5-25 100-62.5-25 Aerosol]  10     Sig: INHALE ONE (1) PUFF BY MOUTH DAILY    gabapentin (NEURONTIN) 400 MG capsule [Pharmacy Med Name: GABAPENTIN 400 MG CAPS 400 Capsule] 150 capsule 10     Sig: TAKE TWO (2) CAPSULES BY MOUTH EVERY MORNING, TAKE 1 CAPSULE AT LUNCH AND TAKE 2 CAPSULES AT BEDTIME

## 2024-02-01 ENCOUNTER — OFFICE VISIT (OUTPATIENT)
Dept: FAMILY MEDICINE CLINIC | Age: 59
End: 2024-02-01
Payer: COMMERCIAL

## 2024-02-01 VITALS
DIASTOLIC BLOOD PRESSURE: 84 MMHG | HEIGHT: 63 IN | OXYGEN SATURATION: 96 % | HEART RATE: 75 BPM | BODY MASS INDEX: 43.52 KG/M2 | SYSTOLIC BLOOD PRESSURE: 126 MMHG | WEIGHT: 245.6 LBS

## 2024-02-01 DIAGNOSIS — E78.5 HYPERLIPIDEMIA, UNSPECIFIED HYPERLIPIDEMIA TYPE: ICD-10-CM

## 2024-02-01 DIAGNOSIS — Z79.4 TYPE 2 DIABETES MELLITUS WITH OTHER SPECIFIED COMPLICATION, WITH LONG-TERM CURRENT USE OF INSULIN (HCC): Primary | ICD-10-CM

## 2024-02-01 DIAGNOSIS — E11.69 TYPE 2 DIABETES MELLITUS WITH OTHER SPECIFIED COMPLICATION, WITH LONG-TERM CURRENT USE OF INSULIN (HCC): Primary | ICD-10-CM

## 2024-02-01 DIAGNOSIS — I10 ESSENTIAL HYPERTENSION: ICD-10-CM

## 2024-02-01 LAB
25(OH)D3 SERPL-MCNC: 18.5 NG/ML
ALBUMIN SERPL-MCNC: 4.7 G/DL (ref 3.4–5)
ALBUMIN/GLOB SERPL: 1.9 {RATIO} (ref 1.1–2.2)
ALP SERPL-CCNC: 95 U/L (ref 40–129)
ALT SERPL-CCNC: 41 U/L (ref 10–40)
ANION GAP SERPL CALCULATED.3IONS-SCNC: 11 MMOL/L (ref 3–16)
AST SERPL-CCNC: 28 U/L (ref 15–37)
BASOPHILS # BLD: 0.1 K/UL (ref 0–0.2)
BASOPHILS NFR BLD: 0.9 %
BILIRUB SERPL-MCNC: 0.7 MG/DL (ref 0–1)
BUN SERPL-MCNC: 17 MG/DL (ref 7–20)
CALCIUM SERPL-MCNC: 9.5 MG/DL (ref 8.3–10.6)
CHLORIDE SERPL-SCNC: 101 MMOL/L (ref 99–110)
CO2 SERPL-SCNC: 29 MMOL/L (ref 21–32)
CREAT SERPL-MCNC: 0.8 MG/DL (ref 0.9–1.3)
DEPRECATED RDW RBC AUTO: 12.1 % (ref 12.4–15.4)
EOSINOPHIL # BLD: 0.2 K/UL (ref 0–0.6)
EOSINOPHIL NFR BLD: 2.2 %
GFR SERPLBLD CREATININE-BSD FMLA CKD-EPI: >60 ML/MIN/{1.73_M2}
GLUCOSE SERPL-MCNC: 169 MG/DL (ref 70–99)
HCT VFR BLD AUTO: 41.6 % (ref 40.5–52.5)
HGB BLD-MCNC: 14.6 G/DL (ref 13.5–17.5)
LYMPHOCYTES # BLD: 1.8 K/UL (ref 1–5.1)
LYMPHOCYTES NFR BLD: 25 %
MCH RBC QN AUTO: 32.2 PG (ref 26–34)
MCHC RBC AUTO-ENTMCNC: 35 G/DL (ref 31–36)
MCV RBC AUTO: 91.8 FL (ref 80–100)
MONOCYTES # BLD: 0.4 K/UL (ref 0–1.3)
MONOCYTES NFR BLD: 6.1 %
NEUTROPHILS # BLD: 4.6 K/UL (ref 1.7–7.7)
NEUTROPHILS NFR BLD: 65.8 %
PLATELET # BLD AUTO: 154 K/UL (ref 135–450)
PMV BLD AUTO: 10.4 FL (ref 5–10.5)
POTASSIUM SERPL-SCNC: 3.8 MMOL/L (ref 3.5–5.1)
PROT SERPL-MCNC: 7.2 G/DL (ref 6.4–8.2)
RBC # BLD AUTO: 4.53 M/UL (ref 4.2–5.9)
SODIUM SERPL-SCNC: 141 MMOL/L (ref 136–145)
TSH SERPL DL<=0.005 MIU/L-ACNC: 2.43 UIU/ML (ref 0.27–4.2)
VIT B12 SERPL-MCNC: 1116 PG/ML (ref 211–911)
WBC # BLD AUTO: 7.1 K/UL (ref 4–11)

## 2024-02-01 PROCEDURE — 3046F HEMOGLOBIN A1C LEVEL >9.0%: CPT | Performed by: STUDENT IN AN ORGANIZED HEALTH CARE EDUCATION/TRAINING PROGRAM

## 2024-02-01 PROCEDURE — G8417 CALC BMI ABV UP PARAM F/U: HCPCS | Performed by: STUDENT IN AN ORGANIZED HEALTH CARE EDUCATION/TRAINING PROGRAM

## 2024-02-01 PROCEDURE — 2022F DILAT RTA XM EVC RTNOPTHY: CPT | Performed by: STUDENT IN AN ORGANIZED HEALTH CARE EDUCATION/TRAINING PROGRAM

## 2024-02-01 PROCEDURE — G8482 FLU IMMUNIZE ORDER/ADMIN: HCPCS | Performed by: STUDENT IN AN ORGANIZED HEALTH CARE EDUCATION/TRAINING PROGRAM

## 2024-02-01 PROCEDURE — 4004F PT TOBACCO SCREEN RCVD TLK: CPT | Performed by: STUDENT IN AN ORGANIZED HEALTH CARE EDUCATION/TRAINING PROGRAM

## 2024-02-01 PROCEDURE — 3079F DIAST BP 80-89 MM HG: CPT | Performed by: STUDENT IN AN ORGANIZED HEALTH CARE EDUCATION/TRAINING PROGRAM

## 2024-02-01 PROCEDURE — 3017F COLORECTAL CA SCREEN DOC REV: CPT | Performed by: STUDENT IN AN ORGANIZED HEALTH CARE EDUCATION/TRAINING PROGRAM

## 2024-02-01 PROCEDURE — G8427 DOCREV CUR MEDS BY ELIG CLIN: HCPCS | Performed by: STUDENT IN AN ORGANIZED HEALTH CARE EDUCATION/TRAINING PROGRAM

## 2024-02-01 PROCEDURE — 36415 COLL VENOUS BLD VENIPUNCTURE: CPT | Performed by: STUDENT IN AN ORGANIZED HEALTH CARE EDUCATION/TRAINING PROGRAM

## 2024-02-01 PROCEDURE — 99214 OFFICE O/P EST MOD 30 MIN: CPT | Performed by: STUDENT IN AN ORGANIZED HEALTH CARE EDUCATION/TRAINING PROGRAM

## 2024-02-01 PROCEDURE — 3074F SYST BP LT 130 MM HG: CPT | Performed by: STUDENT IN AN ORGANIZED HEALTH CARE EDUCATION/TRAINING PROGRAM

## 2024-02-01 RX ORDER — PIOGLITAZONEHYDROCHLORIDE 30 MG/1
30 TABLET ORAL DAILY
Qty: 90 TABLET | Refills: 2 | Status: SHIPPED | OUTPATIENT
Start: 2024-02-01

## 2024-02-01 RX ORDER — CYANOCOBALAMIN/FOLIC ACID 1MG-400MCG
1000 TABLET, SUBLINGUAL SUBLINGUAL DAILY
Qty: 90 TABLET | Refills: 1 | Status: SHIPPED | OUTPATIENT
Start: 2024-02-01

## 2024-02-01 RX ORDER — OMEGA-3-ACID ETHYL ESTERS 1 G/1
2 CAPSULE, LIQUID FILLED ORAL 2 TIMES DAILY
Qty: 120 CAPSULE | Refills: 5 | Status: SHIPPED | OUTPATIENT
Start: 2024-02-01

## 2024-02-01 RX ORDER — LISINOPRIL 5 MG/1
5 TABLET ORAL DAILY
Qty: 90 TABLET | Refills: 5 | Status: SHIPPED | OUTPATIENT
Start: 2024-02-01

## 2024-02-01 RX ORDER — DICLOFENAC SODIUM 75 MG/1
75 TABLET, DELAYED RELEASE ORAL 2 TIMES DAILY
Qty: 180 TABLET | Refills: 1 | Status: SHIPPED | OUTPATIENT
Start: 2024-02-01

## 2024-02-01 RX ORDER — FLUTICASONE FUROATE, UMECLIDINIUM BROMIDE AND VILANTEROL TRIFENATATE 200; 62.5; 25 UG/1; UG/1; UG/1
1 POWDER RESPIRATORY (INHALATION) DAILY
Qty: 1 EACH | Refills: 5 | Status: SHIPPED | OUTPATIENT
Start: 2024-02-01

## 2024-02-01 ASSESSMENT — PATIENT HEALTH QUESTIONNAIRE - PHQ9
7. TROUBLE CONCENTRATING ON THINGS, SUCH AS READING THE NEWSPAPER OR WATCHING TELEVISION: 0
1. LITTLE INTEREST OR PLEASURE IN DOING THINGS: 0
SUM OF ALL RESPONSES TO PHQ QUESTIONS 1-9: 1
5. POOR APPETITE OR OVEREATING: 0
SUM OF ALL RESPONSES TO PHQ QUESTIONS 1-9: 1
2. FEELING DOWN, DEPRESSED OR HOPELESS: 0
10. IF YOU CHECKED OFF ANY PROBLEMS, HOW DIFFICULT HAVE THESE PROBLEMS MADE IT FOR YOU TO DO YOUR WORK, TAKE CARE OF THINGS AT HOME, OR GET ALONG WITH OTHER PEOPLE: 0
6. FEELING BAD ABOUT YOURSELF - OR THAT YOU ARE A FAILURE OR HAVE LET YOURSELF OR YOUR FAMILY DOWN: 0
8. MOVING OR SPEAKING SO SLOWLY THAT OTHER PEOPLE COULD HAVE NOTICED. OR THE OPPOSITE, BEING SO FIGETY OR RESTLESS THAT YOU HAVE BEEN MOVING AROUND A LOT MORE THAN USUAL: 0
SUM OF ALL RESPONSES TO PHQ QUESTIONS 1-9: 1
4. FEELING TIRED OR HAVING LITTLE ENERGY: 0
9. THOUGHTS THAT YOU WOULD BE BETTER OFF DEAD, OR OF HURTING YOURSELF: 0
SUM OF ALL RESPONSES TO PHQ9 QUESTIONS 1 & 2: 0
SUM OF ALL RESPONSES TO PHQ QUESTIONS 1-9: 1
3. TROUBLE FALLING OR STAYING ASLEEP: 1

## 2024-02-01 ASSESSMENT — ENCOUNTER SYMPTOMS
SHORTNESS OF BREATH: 0
COUGH: 0
RHINORRHEA: 0

## 2024-02-01 NOTE — PROGRESS NOTES
DAILY 100 each 1    fluticasone (FLONASE) 50 MCG/ACT nasal spray USE 1 SPRAY IN EACH NOSTRIL DAILY 16 g 10    TRUEPLUS LANCETS 28G MISC As indicated. 100 each 10    Lancets MISC Test once daily for diabetes e11.9 100 each 3    Blood Glucose Monitoring Suppl (TRUE METRIX METER) w/Device KIT USE DAILY TO CHECK BLOOD SUGAR 1 kit 0    Blood Glucose Monitoring Suppl (TRUE METRIX METER) w/Device KIT USE DAILY TO CHECK BLOOD SUGAR 1 kit 0    ONE TOUCH LANCETS MISC 1 each by Does not apply route daily 100 each 3    Respiratory Therapy Supplies (NEBULIZER COMPRESSOR) KIT 1 kit by Does not apply route daily as needed (use as needed) Use with inhalation solution, DX: J45.41 1 kit 0    EPINEPHrine (EPIPEN 2-LINDA) 0.3 MG/0.3ML SOAJ injection Inject 0.3 mLs into the muscle once for 1 dose Use as directed for allergic reaction, immediately call 911 or come to the emergency room if you ever need to use this medication. 0.3 mL 0     Current Facility-Administered Medications on File Prior to Visit   Medication Dose Route Frequency Provider Last Rate Last Admin    albuterol (ACCUNEB) nebulizer solution 1.25 mg  1.25 mg Nebulization Once Delilah Stuart MD        levalbuterol (XOPENEX) nebulization 0.63 mg  0.63 mg Nebulization Once Bhargavi Rodriguez MD          Past Medical History:   Diagnosis Date    Asthma     Diabetes mellitus (HCC)     Hyperlipidemia     Hypertension     Lateral epicondylitis of left elbow 8/22/2019    Microalbuminuria     LIANE on CPAP     LIANE treated with BiPAP     LIANE treated with BiPAP     SOB (shortness of breath)     Umbilical hernia      Patient Active Problem List   Diagnosis    Left sided sciatica    Mild episode of recurrent major depressive disorder (HCC)    Chronic leg pain    Essential hypertension    Umbilical hernia    DM type 2, uncontrolled, with neuropathy    Cigarette smoker    Mucopurulent chronic bronchitis (HCC)    Pure hypercholesterolemia    Back pain    Closed C6 fracture (HCC)    Chronic

## 2024-02-02 LAB
EST. AVERAGE GLUCOSE BLD GHB EST-MCNC: 154.2 MG/DL
HBA1C MFR BLD: 7 %

## 2024-02-06 DIAGNOSIS — E78.1 HYPERTRIGLYCERIDEMIA: ICD-10-CM

## 2024-02-06 DIAGNOSIS — Z91.030 ALLERGY TO HONEY BEE VENOM: Primary | ICD-10-CM

## 2024-02-06 RX ORDER — OMEGA-3-ACID ETHYL ESTERS 1 G/1
2 CAPSULE, LIQUID FILLED ORAL 2 TIMES DAILY
Qty: 360 CAPSULE | Refills: 1 | Status: SHIPPED | OUTPATIENT
Start: 2024-02-06

## 2024-02-06 RX ORDER — EPINEPHRINE 0.3 MG/.3ML
0.3 INJECTION SUBCUTANEOUS ONCE
Qty: 0.3 ML | Refills: 0 | OUTPATIENT
Start: 2024-02-06 | End: 2024-02-06

## 2024-02-06 RX ORDER — EPINEPHRINE 0.3 MG/.3ML
0.3 INJECTION SUBCUTANEOUS DAILY PRN
Qty: 0.6 ML | Refills: 0 | Status: SHIPPED | OUTPATIENT
Start: 2024-02-06

## 2024-02-06 NOTE — TELEPHONE ENCOUNTER
PT Name: Mu Crowley  MR #: [id number]     Physician Query Form - Documentation Clarification      CDS/: Edwige Robertson               Contact information: [email or web address]    This form is a permanent document in the medical record.     Query Date: April 6, 2020    By submitting this query, we are merely seeking further clarification of documentation. Please utilize your independent clinical judgment when addressing the question(s) below.    The Medical record reflects the following:    Supporting Clinical Findings Location in Medical Record     Suspected Covid-19 Virus Infection      ED summary          Labs                                                                            Doctor, Please specify diagnosis or diagnoses associated with above clinical findings.    Provider Use Only                                                                                                                                 [ {Click F2; select 'X' if Clinically Undetermined:17961} ] Clinically Undetermined                Pt states that his EPI Pen expires next month and woul anyi a new one sent to the pharmacy, also needs a refill on fish oil

## 2024-02-12 ENCOUNTER — TELEPHONE (OUTPATIENT)
Dept: ADMINISTRATIVE | Age: 59
End: 2024-02-12

## 2024-02-12 NOTE — TELEPHONE ENCOUNTER
Submitted PA for EPINEPHrine 0.3MG/0.3ML auto-injectors  Via CMM Key: [id number] STATUS: PENDING.    Follow up done daily; if no decision with in three days we will refax.  If another three days goes by with no decision will call the insurance for status.

## 2024-02-13 NOTE — TELEPHONE ENCOUNTER
Per Ian: PA Not Required for EPINEPHrine 0.3MG/0.3ML auto-injectors; letter attached.    If this requires a response please respond to the pool ( P MHCX PSC MEDICATION PRE-AUTH).      Thank you please advise patient.

## 2024-02-27 RX ORDER — INSULIN GLARGINE 100 [IU]/ML
INJECTION, SOLUTION SUBCUTANEOUS
Qty: 15 ML | Refills: 5 | Status: SHIPPED | OUTPATIENT
Start: 2024-02-27

## 2024-02-27 NOTE — TELEPHONE ENCOUNTER
Refill Request         Last Seen: Last Seen Department: 2/1/2024  Last Seen by PCP: 2/1/2024    Last Written: 02/01/2024      Next Appointment:   Future Appointments   Date Time Provider Department Center   5/3/2024  7:30 AM Ajit Davalos DO [place] [facility]   6/27/2024 10:00 AM Brooke Oneal APRN - CNP [facility]         Requested Prescriptions     Pending Prescriptions Disp Refills    LANTUS SOLOSTAR 100 UNIT/ML injection pen [Pharmacy Med Name: LANTUS SOLOSTAR PEN 15ML 100 INSU] 15 mL 10     Sig: INJECT 50 UNITS SUBCUTANEOUSLY NIGHTLY

## 2024-03-12 ENCOUNTER — TELEPHONE (OUTPATIENT)
Dept: FAMILY MEDICINE CLINIC | Age: 59
End: 2024-03-12

## 2024-03-12 NOTE — TELEPHONE ENCOUNTER
[facility] pharmacy calling stating that the vitamin D 3 50,000 is currently not available.

## 2024-03-22 DIAGNOSIS — Z79.4 TYPE 2 DIABETES MELLITUS WITH OTHER SPECIFIED COMPLICATION, WITH LONG-TERM CURRENT USE OF INSULIN (HCC): ICD-10-CM

## 2024-03-22 DIAGNOSIS — E11.69 TYPE 2 DIABETES MELLITUS WITH OTHER SPECIFIED COMPLICATION, WITH LONG-TERM CURRENT USE OF INSULIN (HCC): ICD-10-CM

## 2024-03-25 RX ORDER — BLOOD-GLUCOSE SENSOR
EACH MISCELLANEOUS
Qty: 6 EACH | Refills: 3 | Status: SHIPPED | OUTPATIENT
Start: 2024-03-25

## 2024-03-28 DIAGNOSIS — E11.69 TYPE 2 DIABETES MELLITUS WITH OTHER SPECIFIED COMPLICATION, WITH LONG-TERM CURRENT USE OF INSULIN (HCC): ICD-10-CM

## 2024-03-28 DIAGNOSIS — Z79.4 TYPE 2 DIABETES MELLITUS WITH OTHER SPECIFIED COMPLICATION, WITH LONG-TERM CURRENT USE OF INSULIN (HCC): ICD-10-CM

## 2024-04-01 ENCOUNTER — TELEPHONE (OUTPATIENT)
Dept: FAMILY MEDICINE CLINIC | Age: 59
End: 2024-04-01

## 2024-04-01 RX ORDER — BLOOD-GLUCOSE SENSOR
EACH MISCELLANEOUS
Qty: 6 EACH | Refills: 3 | Status: SHIPPED | OUTPATIENT
Start: 2024-04-01 | End: 2024-04-04

## 2024-04-01 NOTE — TELEPHONE ENCOUNTER
[facility] pharmacy called stating they can't fill the B-12. I called the patient to tell him to switch the medication to [facility] but he did not answer and I couldn't leave a voicemail.

## 2024-04-01 NOTE — TELEPHONE ENCOUNTER
Refill Request         Last Seen: Last Seen Department: 2/1/2024  Last Seen by PCP: 2/1/2024    Last Written: 03/25/2024        Next Appointment:   Future Appointments   Date Time Provider Department Center   5/3/2024  7:30 AM Ajit Davalos DO [place] [facility]   6/27/2024 10:00 AM Brooke Oneal APRN - CNP [facility]         Requested Prescriptions     Pending Prescriptions Disp Refills    Continuous Blood Gluc Sensor (FREESTYLE GEORGIE 3 SENSOR) MISC [Pharmacy Med Name: FREESTYLE GEORGIE 3 SENSOR MI Miscellaneous]  10     Sig: USE AS DIRECTED CHANGE EVERY 14 DAYS

## 2024-04-03 DIAGNOSIS — E11.69 TYPE 2 DIABETES MELLITUS WITH OTHER SPECIFIED COMPLICATION, WITH LONG-TERM CURRENT USE OF INSULIN (HCC): ICD-10-CM

## 2024-04-03 DIAGNOSIS — Z79.4 TYPE 2 DIABETES MELLITUS WITH OTHER SPECIFIED COMPLICATION, WITH LONG-TERM CURRENT USE OF INSULIN (HCC): ICD-10-CM

## 2024-04-04 RX ORDER — BLOOD-GLUCOSE SENSOR
EACH MISCELLANEOUS
Qty: 2 EACH | Refills: 10 | Status: SHIPPED | OUTPATIENT
Start: 2024-04-04

## 2024-04-04 NOTE — TELEPHONE ENCOUNTER
Refill Request       Last Seen: Last Seen Department: 2/1/2024  Last Seen by PCP: 2/1/2024    Last Written: 04/01/2024      Next Appointment:   Future Appointments   Date Time Provider Department Center   5/3/2024  7:30 AM Ajit Davalos DO [place] [facility]   6/27/2024 10:00 AM Brooke Oneal APRN - CNP [facility]             Requested Prescriptions     Pending Prescriptions Disp Refills    Continuous Blood Gluc Sensor (FREESTYLE GEORGIE 3 SENSOR) MISC [Pharmacy Med Name: FREESTYLE GEORGIE 3 SENSOR MI Miscellaneous]  10     Sig: USE AS DIRECTED CHANGE EVERY 14 DAYS

## 2024-04-09 DIAGNOSIS — Z79.4 TYPE 2 DIABETES MELLITUS WITH OTHER SPECIFIED COMPLICATION, WITH LONG-TERM CURRENT USE OF INSULIN (HCC): ICD-10-CM

## 2024-04-09 DIAGNOSIS — E11.69 TYPE 2 DIABETES MELLITUS WITH OTHER SPECIFIED COMPLICATION, WITH LONG-TERM CURRENT USE OF INSULIN (HCC): ICD-10-CM

## 2024-04-10 RX ORDER — BLOOD-GLUCOSE SENSOR
EACH MISCELLANEOUS
Qty: 2 EACH | Refills: 10 | Status: SHIPPED | OUTPATIENT
Start: 2024-04-10

## 2024-04-10 NOTE — TELEPHONE ENCOUNTER
Refill Request         Last Seen: Last Seen Department: 2/1/2024  Last Seen by PCP: 2/1/2024    Last Written: 04/04/2024      Next Appointment:   Future Appointments   Date Time Provider Department Center   5/3/2024  7:30 AM Ajit Davalos DO [place] [facility] [place]   6/27/2024 10:00 AM Brooke Oneal APRN - CNP [facility]           Requested Prescriptions     Pending Prescriptions Disp Refills    Continuous Blood Gluc Sensor (FREESTYLE GEORGIE 3 SENSOR) MISC [Pharmacy Med Name: FREESTYLE GEORGIE 3 SENSOR MI Miscellaneous]  10     Sig: USE AS DIRECTED CHANGE EVERY 14 DAYS

## 2024-04-22 DIAGNOSIS — G62.9 NEUROPATHY: ICD-10-CM

## 2024-04-23 RX ORDER — ATORVASTATIN CALCIUM 40 MG/1
40 TABLET, FILM COATED ORAL
Qty: 30 TABLET | Refills: 10 | Status: SHIPPED | OUTPATIENT
Start: 2024-04-23

## 2024-04-23 RX ORDER — GABAPENTIN 400 MG/1
CAPSULE ORAL
Qty: 150 CAPSULE | Refills: 2 | Status: SHIPPED | OUTPATIENT
Start: 2024-04-23 | End: 2024-07-22

## 2024-04-23 RX ORDER — METOPROLOL TARTRATE 50 MG/1
50 TABLET, FILM COATED ORAL 2 TIMES DAILY
Qty: 60 TABLET | Refills: 10 | Status: SHIPPED | OUTPATIENT
Start: 2024-04-23

## 2024-04-23 NOTE — TELEPHONE ENCOUNTER
Refill Request     Last Seen: Last Seen Department: 2/1/2024  Last Seen by PCP: 2/1/2024    Last Written: 10/23/23      Next Appointment:   Future Appointments   Date Time Provider Department Center   5/3/2024  7:30 AM Ajit Davalos DO [place] [facility]   6/27/2024 10:00 AM Brooke Oneal APRN - CNP [facility]           Requested Prescriptions     Pending Prescriptions Disp Refills    metoprolol tartrate (LOPRESSOR) 50 MG tablet [Pharmacy Med Name: METOPROLOL TART 50MG TAB 50 Tablet] 60 tablet 10     Sig: TAKE 1 TABLET BY MOUTH TWICE DAILY    gabapentin (NEURONTIN) 400 MG capsule [Pharmacy Med Name: GABAPENTIN 400 MG CAPS 400 Capsule] 150 capsule 10     Sig: TAKE TWO (2) CAPSULES BY MOUTH EVERY MORNING, 1 CAPSULE AT LUNCH, AND 2 CAPSULES AT BEDTIME    atorvastatin (LIPITOR) 40 MG tablet [Pharmacy Med Name: ATORVASTATIN 40MG TABLET 40 Tablet] 30 tablet 10     Sig: Take 1 tablet by mouth nightly

## 2024-06-27 ENCOUNTER — TELEPHONE (OUTPATIENT)
Dept: SLEEP MEDICINE | Age: 59
End: 2024-06-27

## 2024-06-27 NOTE — TELEPHONE ENCOUNTER
Patient did not show for appointment on 6/27 with Brooke Oneal for 1yr sleep  CR uploaded .      Last OV 6/29/23

## 2024-07-22 DIAGNOSIS — G62.9 NEUROPATHY: ICD-10-CM

## 2024-07-23 RX ORDER — GABAPENTIN 400 MG/1
CAPSULE ORAL
Qty: 150 CAPSULE | Refills: 10 | OUTPATIENT
Start: 2024-07-23

## 2024-08-21 RX ORDER — INSULIN GLARGINE 100 [IU]/ML
INJECTION, SOLUTION SUBCUTANEOUS
Qty: 15 ML | Refills: 10 | Status: SHIPPED | OUTPATIENT
Start: 2024-08-21

## 2024-08-29 DIAGNOSIS — G62.9 NEUROPATHY: ICD-10-CM

## 2024-08-29 RX ORDER — GABAPENTIN 400 MG/1
CAPSULE ORAL
Qty: 150 CAPSULE | Refills: 2 | Status: SHIPPED | OUTPATIENT
Start: 2024-08-29 | End: 2024-11-27

## 2024-08-29 NOTE — TELEPHONE ENCOUNTER
Pt states that he is out of town and needs a refill of his medication, he has an appointment scheduled for 09/30/2024

## 2024-09-04 ENCOUNTER — OFFICE VISIT (OUTPATIENT)
Dept: SLEEP MEDICINE | Age: 59
End: 2024-09-04
Payer: COMMERCIAL

## 2024-09-04 VITALS
WEIGHT: 235 LBS | BODY MASS INDEX: 41.64 KG/M2 | DIASTOLIC BLOOD PRESSURE: 60 MMHG | HEART RATE: 66 BPM | HEIGHT: 63 IN | OXYGEN SATURATION: 97 % | SYSTOLIC BLOOD PRESSURE: 118 MMHG

## 2024-09-04 DIAGNOSIS — G47.33 SEVERE OBSTRUCTIVE SLEEP APNEA: Primary | ICD-10-CM

## 2024-09-04 DIAGNOSIS — E66.01 OBESITY, MORBID, BMI 40.0-49.9 (HCC): ICD-10-CM

## 2024-09-04 DIAGNOSIS — Z71.89 ENCOUNTER FOR BIPAP USE COUNSELING: ICD-10-CM

## 2024-09-04 DIAGNOSIS — I10 PRIMARY HYPERTENSION: ICD-10-CM

## 2024-09-04 PROCEDURE — G8427 DOCREV CUR MEDS BY ELIG CLIN: HCPCS | Performed by: NURSE PRACTITIONER

## 2024-09-04 PROCEDURE — 3078F DIAST BP <80 MM HG: CPT | Performed by: NURSE PRACTITIONER

## 2024-09-04 PROCEDURE — 99214 OFFICE O/P EST MOD 30 MIN: CPT | Performed by: NURSE PRACTITIONER

## 2024-09-04 PROCEDURE — 3017F COLORECTAL CA SCREEN DOC REV: CPT | Performed by: NURSE PRACTITIONER

## 2024-09-04 PROCEDURE — 3074F SYST BP LT 130 MM HG: CPT | Performed by: NURSE PRACTITIONER

## 2024-09-04 PROCEDURE — G8417 CALC BMI ABV UP PARAM F/U: HCPCS | Performed by: NURSE PRACTITIONER

## 2024-09-04 PROCEDURE — 4004F PT TOBACCO SCREEN RCVD TLK: CPT | Performed by: NURSE PRACTITIONER

## 2024-09-04 ASSESSMENT — SLEEP AND FATIGUE QUESTIONNAIRES
HOW LIKELY ARE YOU TO NOD OFF OR FALL ASLEEP WHILE SITTING INACTIVE IN A PUBLIC PLACE: SLIGHT CHANCE OF DOZING
HOW LIKELY ARE YOU TO NOD OFF OR FALL ASLEEP WHILE SITTING QUIETLY AFTER LUNCH WITHOUT ALCOHOL: MODERATE CHANCE OF DOZING
HOW LIKELY ARE YOU TO NOD OFF OR FALL ASLEEP WHILE SITTING AND READING: SLIGHT CHANCE OF DOZING
HOW LIKELY ARE YOU TO NOD OFF OR FALL ASLEEP IN A CAR, WHILE STOPPED FOR A FEW MINUTES IN TRAFFIC: WOULD NEVER DOZE
HOW LIKELY ARE YOU TO NOD OFF OR FALL ASLEEP WHILE WATCHING TV: SLIGHT CHANCE OF DOZING
HOW LIKELY ARE YOU TO NOD OFF OR FALL ASLEEP WHILE LYING DOWN TO REST IN THE AFTERNOON WHEN CIRCUMSTANCES PERMIT: WOULD NEVER DOZE
HOW LIKELY ARE YOU TO NOD OFF OR FALL ASLEEP WHILE SITTING AND TALKING TO SOMEONE: WOULD NEVER DOZE
ESS TOTAL SCORE: 6
HOW LIKELY ARE YOU TO NOD OFF OR FALL ASLEEP WHEN YOU ARE A PASSENGER IN A CAR FOR AN HOUR WITHOUT A BREAK: SLIGHT CHANCE OF DOZING

## 2024-09-04 NOTE — PROGRESS NOTES
Patient ID: Kosta Pichardo is a 59 y.o. male who is being seen today for   Chief Complaint   Patient presents with    Follow-up     Sleep     Self referral    HPI:   Kosta Pichardo is a 59 y.o. male in office for LIANE follow up. States he is doing well with BIPAP.   Patient is using BiPAP   7-10hrs/night. Using humidifier. No snoring on BiPAP. The pressure is well tolerated. The mask is comfortable- full face. No mask leak. No significant daytime sleepiness. No nodding off when driving. No dry nose or throat. No fatigue. Bedtime is 6-7 pm and rise time is 4-430 am. Sleep onset is few minutes. Wakes up 1-2 times at night total. 1-2 nocturia. It takes few minutes to fall back a sleep. No naps during the day. No headache in am. No weight gain. No caffienated beverages during the day. No alcohol. ESS is 6          Previous HPI 6/29/23  Kosta Pichardo is a 59 y.o. male in office for LIANE follow up.  States he received new BIPAP from [email or web address] but states it won't turn off when he takes it off (does not push on/off button).  States otherwise it was okay.  States he has been using BIPAP that he purchased refurbished since.     Patient is using BiPAP  8 hrs/night. Using humidifier. No snoring on BPAP. The pressure is well tolerated. The mask is comfortable-full face. No mask leak. No significant daytime sleepiness. No nodding off when driving. No dry nose or throat. Some fatigue. Bedtime is 6-7 pm and rise time is 430 am. Sleep onset is few minutes. Wakes up 2 times at night total. 2 nocturia. It takes few minutes to fall back a sleep. Occasional naps during the day. No headache in am. No weight gain. No caffienated beverages during the day. Rare alcohol. ESS is 6          Previous HPI 2/22/23  Kosta Pichardo is a 59 y.o. male in office for LIANE follow up.  States BIPAP malfunctioning needed a new BIPAP, and it is over 5 years old.  Patient is using BiPAP   8-9 hrs/night. Using humidifier. No snoring on BiPAP. The

## 2024-09-20 RX ORDER — DICLOFENAC SODIUM 75 MG/1
75 TABLET, DELAYED RELEASE ORAL 2 TIMES DAILY
Qty: 60 TABLET | Refills: 5 | Status: SHIPPED | OUTPATIENT
Start: 2024-09-20

## 2024-10-21 ENCOUNTER — OFFICE VISIT (OUTPATIENT)
Dept: FAMILY MEDICINE CLINIC | Age: 59
End: 2024-10-21
Payer: COMMERCIAL

## 2024-10-21 ENCOUNTER — TELEPHONE (OUTPATIENT)
Dept: FAMILY MEDICINE CLINIC | Age: 59
End: 2024-10-21

## 2024-10-21 VITALS
HEIGHT: 63 IN | BODY MASS INDEX: 42.03 KG/M2 | HEART RATE: 66 BPM | DIASTOLIC BLOOD PRESSURE: 72 MMHG | WEIGHT: 237.2 LBS | OXYGEN SATURATION: 97 % | SYSTOLIC BLOOD PRESSURE: 120 MMHG

## 2024-10-21 DIAGNOSIS — E11.69 TYPE 2 DIABETES MELLITUS WITH OTHER SPECIFIED COMPLICATION, WITH LONG-TERM CURRENT USE OF INSULIN (HCC): Primary | ICD-10-CM

## 2024-10-21 DIAGNOSIS — J40 BRONCHITIS: ICD-10-CM

## 2024-10-21 DIAGNOSIS — Z79.4 DIABETES MELLITUS DUE TO UNDERLYING CONDITION WITH HYPERGLYCEMIA, WITH LONG-TERM CURRENT USE OF INSULIN (HCC): ICD-10-CM

## 2024-10-21 DIAGNOSIS — R05.1 ACUTE COUGH: ICD-10-CM

## 2024-10-21 DIAGNOSIS — Z79.4 TYPE 2 DIABETES MELLITUS WITH OTHER SPECIFIED COMPLICATION, WITH LONG-TERM CURRENT USE OF INSULIN (HCC): Primary | ICD-10-CM

## 2024-10-21 DIAGNOSIS — G62.9 NEUROPATHY: ICD-10-CM

## 2024-10-21 DIAGNOSIS — E08.65 DIABETES MELLITUS DUE TO UNDERLYING CONDITION WITH HYPERGLYCEMIA, WITH LONG-TERM CURRENT USE OF INSULIN (HCC): ICD-10-CM

## 2024-10-21 DIAGNOSIS — Z91.030 ALLERGY TO HONEY BEE VENOM: ICD-10-CM

## 2024-10-21 LAB
ALBUMIN SERPL-MCNC: 4.4 G/DL (ref 3.4–5)
ALBUMIN/GLOB SERPL: 2.2 {RATIO} (ref 1.1–2.2)
ALP SERPL-CCNC: 88 U/L (ref 40–129)
ALT SERPL-CCNC: 32 U/L (ref 10–40)
ANION GAP SERPL CALCULATED.3IONS-SCNC: 12 MMOL/L (ref 3–16)
AST SERPL-CCNC: 24 U/L (ref 15–37)
BASOPHILS # BLD: 0.1 K/UL (ref 0–0.2)
BASOPHILS NFR BLD: 0.7 %
BILIRUB SERPL-MCNC: 0.8 MG/DL (ref 0–1)
BUN SERPL-MCNC: 11 MG/DL (ref 7–20)
CALCIUM SERPL-MCNC: 9.7 MG/DL (ref 8.3–10.6)
CHLORIDE SERPL-SCNC: 100 MMOL/L (ref 99–110)
CHOLEST SERPL-MCNC: 135 MG/DL (ref 0–199)
CO2 SERPL-SCNC: 26 MMOL/L (ref 21–32)
CREAT SERPL-MCNC: 0.8 MG/DL (ref 0.9–1.3)
DEPRECATED RDW RBC AUTO: 12.4 % (ref 12.4–15.4)
EOSINOPHIL # BLD: 0.1 K/UL (ref 0–0.6)
EOSINOPHIL NFR BLD: 1.7 %
EST. AVERAGE GLUCOSE BLD GHB EST-MCNC: 154.2 MG/DL
GFR SERPLBLD CREATININE-BSD FMLA CKD-EPI: >90 ML/MIN/{1.73_M2}
GLUCOSE SERPL-MCNC: 144 MG/DL (ref 70–99)
HBA1C MFR BLD: 7 %
HCT VFR BLD AUTO: 42.5 % (ref 40.5–52.5)
HDLC SERPL-MCNC: 45 MG/DL (ref 40–60)
HGB BLD-MCNC: 14.8 G/DL (ref 13.5–17.5)
INFLUENZA A ANTIGEN, POC: NEGATIVE
INFLUENZA B ANTIGEN, POC: NEGATIVE
LDLC SERPL CALC-MCNC: 57 MG/DL
LOT EXPIRE DATE: NORMAL
LOT KIT NUMBER: NORMAL
LYMPHOCYTES # BLD: 1.7 K/UL (ref 1–5.1)
LYMPHOCYTES NFR BLD: 21.5 %
MCH RBC QN AUTO: 32.3 PG (ref 26–34)
MCHC RBC AUTO-ENTMCNC: 34.7 G/DL (ref 31–36)
MCV RBC AUTO: 93.2 FL (ref 80–100)
MONOCYTES # BLD: 0.6 K/UL (ref 0–1.3)
MONOCYTES NFR BLD: 7.3 %
NEUTROPHILS # BLD: 5.4 K/UL (ref 1.7–7.7)
NEUTROPHILS NFR BLD: 68.8 %
PLATELET # BLD AUTO: 162 K/UL (ref 135–450)
PMV BLD AUTO: 10.8 FL (ref 5–10.5)
POTASSIUM SERPL-SCNC: 4.2 MMOL/L (ref 3.5–5.1)
PROT SERPL-MCNC: 6.4 G/DL (ref 6.4–8.2)
RBC # BLD AUTO: 4.56 M/UL (ref 4.2–5.9)
SARS-COV-2, POC: NORMAL
SODIUM SERPL-SCNC: 138 MMOL/L (ref 136–145)
TRIGL SERPL-MCNC: 165 MG/DL (ref 0–150)
TSH SERPL DL<=0.005 MIU/L-ACNC: 1.66 UIU/ML (ref 0.27–4.2)
VALID INTERNAL CONTROL: PRESENT
VENDOR AND KIT NAME POC: NORMAL
VLDLC SERPL CALC-MCNC: 33 MG/DL
WBC # BLD AUTO: 7.8 K/UL (ref 4–11)

## 2024-10-21 PROCEDURE — 4004F PT TOBACCO SCREEN RCVD TLK: CPT | Performed by: STUDENT IN AN ORGANIZED HEALTH CARE EDUCATION/TRAINING PROGRAM

## 2024-10-21 PROCEDURE — 99214 OFFICE O/P EST MOD 30 MIN: CPT | Performed by: STUDENT IN AN ORGANIZED HEALTH CARE EDUCATION/TRAINING PROGRAM

## 2024-10-21 PROCEDURE — 87428 SARSCOV & INF VIR A&B AG IA: CPT | Performed by: STUDENT IN AN ORGANIZED HEALTH CARE EDUCATION/TRAINING PROGRAM

## 2024-10-21 PROCEDURE — 3078F DIAST BP <80 MM HG: CPT | Performed by: STUDENT IN AN ORGANIZED HEALTH CARE EDUCATION/TRAINING PROGRAM

## 2024-10-21 PROCEDURE — G8427 DOCREV CUR MEDS BY ELIG CLIN: HCPCS | Performed by: STUDENT IN AN ORGANIZED HEALTH CARE EDUCATION/TRAINING PROGRAM

## 2024-10-21 PROCEDURE — G8484 FLU IMMUNIZE NO ADMIN: HCPCS | Performed by: STUDENT IN AN ORGANIZED HEALTH CARE EDUCATION/TRAINING PROGRAM

## 2024-10-21 PROCEDURE — 3051F HG A1C>EQUAL 7.0%<8.0%: CPT | Performed by: STUDENT IN AN ORGANIZED HEALTH CARE EDUCATION/TRAINING PROGRAM

## 2024-10-21 PROCEDURE — G8417 CALC BMI ABV UP PARAM F/U: HCPCS | Performed by: STUDENT IN AN ORGANIZED HEALTH CARE EDUCATION/TRAINING PROGRAM

## 2024-10-21 PROCEDURE — 3074F SYST BP LT 130 MM HG: CPT | Performed by: STUDENT IN AN ORGANIZED HEALTH CARE EDUCATION/TRAINING PROGRAM

## 2024-10-21 PROCEDURE — 2022F DILAT RTA XM EVC RTNOPTHY: CPT | Performed by: STUDENT IN AN ORGANIZED HEALTH CARE EDUCATION/TRAINING PROGRAM

## 2024-10-21 PROCEDURE — 3017F COLORECTAL CA SCREEN DOC REV: CPT | Performed by: STUDENT IN AN ORGANIZED HEALTH CARE EDUCATION/TRAINING PROGRAM

## 2024-10-21 RX ORDER — EPINEPHRINE 0.3 MG/.3ML
0.3 INJECTION SUBCUTANEOUS DAILY PRN
Qty: 0.6 ML | Refills: 0 | Status: SHIPPED | OUTPATIENT
Start: 2024-10-21

## 2024-10-21 RX ORDER — GABAPENTIN 400 MG/1
CAPSULE ORAL
Qty: 150 CAPSULE | Refills: 2 | Status: SHIPPED | OUTPATIENT
Start: 2024-10-21 | End: 2025-01-19

## 2024-10-21 RX ORDER — AZITHROMYCIN 250 MG/1
TABLET, FILM COATED ORAL
Qty: 6 TABLET | Refills: 0 | Status: SHIPPED | OUTPATIENT
Start: 2024-10-21 | End: 2024-10-31

## 2024-10-21 RX ORDER — ALBUTEROL SULFATE 90 UG/1
2 AEROSOL, METERED RESPIRATORY (INHALATION) EVERY 4 HOURS PRN
Qty: 8.5 G | Refills: 11 | Status: SHIPPED | OUTPATIENT
Start: 2024-10-21

## 2024-10-21 RX ORDER — BENZONATATE 100 MG/1
100 CAPSULE ORAL 2 TIMES DAILY PRN
Qty: 20 CAPSULE | Refills: 0 | Status: SHIPPED | OUTPATIENT
Start: 2024-10-21 | End: 2024-10-31

## 2024-10-21 RX ORDER — ACYCLOVIR 400 MG/1
1 TABLET ORAL CONTINUOUS
Qty: 6 EACH | Refills: 1 | Status: SHIPPED | OUTPATIENT
Start: 2024-10-21

## 2024-10-21 RX ORDER — ACYCLOVIR 400 MG/1
1 TABLET ORAL CONTINUOUS
Qty: 1 EACH | Refills: 0 | Status: SHIPPED | OUTPATIENT
Start: 2024-10-21

## 2024-10-21 RX ORDER — BUPROPION HYDROCHLORIDE 150 MG/1
TABLET ORAL
Qty: 180 TABLET | Refills: 1 | Status: SHIPPED | OUTPATIENT
Start: 2024-10-21

## 2024-10-21 RX ORDER — FLUTICASONE FUROATE, UMECLIDINIUM BROMIDE AND VILANTEROL TRIFENATATE 200; 62.5; 25 UG/1; UG/1; UG/1
1 POWDER RESPIRATORY (INHALATION) DAILY
Qty: 1 EACH | Refills: 5 | Status: SHIPPED | OUTPATIENT
Start: 2024-10-21

## 2024-10-21 ASSESSMENT — ENCOUNTER SYMPTOMS
COUGH: 1
NAUSEA: 1
RHINORRHEA: 1
SHORTNESS OF BREATH: 1
DIARRHEA: 0
CONSTIPATION: 0
VOMITING: 0

## 2024-10-21 NOTE — TELEPHONE ENCOUNTER
Submitted PA for Mounjaro 2.5MG/0.5ML auto-injectors  Via CMM Key: [id number] STATUS: NOT SENT    I need to submit the results of Hemoglobin A1C lab from today, 10/21/24, with the PA.  Please let me know when the results are available.    Please respond to the pool ( P MHCX PSC MEDICATION PRE-AUTH).      Thank you!

## 2024-10-21 NOTE — PROGRESS NOTES
[facility]  10/21/2024    Kosta Pichardo (:  1965) is a 59 y.o. male, here for evaluation of the following medical concerns:    Chief Complaint   Patient presents with    Follow-up     Patient stated he has URI for past 5 days got it from his wife. Patient stated his sugars have been running high this morning was 146        ASSESSMENT/ PLAN  Assessment & Plan  Neuropathy   Chronic, at goal (stable), continue current treatment plan    Orders:    gabapentin (NEURONTIN) 400 MG capsule; TAKE TWO (2) CAPSULES BY MOUTH EVERY MORNING, 1 CAPSULE AT LUNCH, AND 2 CAPSULES AT BEDTIME    Continuous Glucose Sensor (DEXCOM G7 SENSOR) MISC; 1 each by Does not apply route continuous    Continuous Glucose  (DEXCOM G7 ) LAWRENCE; 1 each by Does not apply route continuous    CBC with Auto Differential; Future    Comprehensive Metabolic Panel; Future    LIPID PANEL; Future    TSH with Reflex; Future    Hemoglobin A1C; Future    Hemoglobin A1C    TSH with Reflex    LIPID PANEL    Comprehensive Metabolic Panel    CBC with Auto Differential    Type 2 diabetes mellitus with other specified complication, with long-term current use of insulin (HCC)  -Blood sugar has been running a little higher in the past few days.  Will add Mounjaro to help with weight loss and blood sugar control.  If blood sugars are less than 102 mornings in a row cut down on nighttime insulin by 2 units.    Orders:    Continuous Glucose Sensor (DEXCOM G7 SENSOR) MISC; 1 each by Does not apply route continuous    Continuous Glucose  (DEXCOM G7 ) LAWRENCE; 1 each by Does not apply route continuous    CBC with Auto Differential; Future    Comprehensive Metabolic Panel; Future    LIPID PANEL; Future    TSH with Reflex; Future    Hemoglobin A1C; Future    Tirzepatide (MOUNJARO) 2.5 MG/0.5ML SOPN SC injection; Inject 0.5 mLs into the skin once a week    Hemoglobin A1C    TSH with Reflex    LIPID PANEL    Comprehensive

## 2024-10-22 ENCOUNTER — TELEPHONE (OUTPATIENT)
Dept: FAMILY MEDICINE CLINIC | Age: 59
End: 2024-10-22

## 2024-10-22 RX ORDER — GABAPENTIN 400 MG/1
CAPSULE ORAL
Qty: 150 CAPSULE | Refills: 10 | OUTPATIENT
Start: 2024-10-22

## 2024-10-22 NOTE — TELEPHONE ENCOUNTER
Submitted PA for Ambar Singletary 200-62.5-25MCG/ACT aerosol powder  Via CMM Key: [id number]  STATUS: APPROVED for 365 days. The PA# assigned is [id number]. Authorization Expiration Date: 10/20/2025     If this requires a response please respond to the pool ( P MHCX PSC MEDICATION PRE-AUTH).      Thank you please advise patient.

## 2024-10-22 NOTE — TELEPHONE ENCOUNTER
Submitted PA for Mounjaro 2.5MG/0.5ML auto-injectors with current A1c lab  Via CMM Key: [id number]  STATUS: PENDING.    Follow up done daily; if no decision with in three days we will refax.  If another three days goes by with no decision will call the insurance for status.

## 2024-10-22 NOTE — TELEPHONE ENCOUNTER
[facility] created and sent the PA for ProAir RespiClick; this is not a preferred drug on [place] Medicaid's formulary.  However, ProAir HFA is a preferred medication and the script was written for this drug.    Please advise the pharmacy to run the claim for ProAir HFA and it should pay without a PA.  If there is still an issue, please let me know.    If this requires a response please respond to the pool ( P MHCX PSC MEDICATION PRE-AUTH).      Thank you!

## 2024-10-24 ENCOUNTER — TELEPHONE (OUTPATIENT)
Dept: TELEMETRY | Age: 59
End: 2024-10-24

## 2024-10-24 DIAGNOSIS — Z72.0 TOBACCO ABUSE: Primary | ICD-10-CM

## 2024-10-24 NOTE — TELEPHONE ENCOUNTER
Patient due for annual CT Lung Screening. Reminder letter mailed.    CT Lung Screening order has been pended to chart should you choose to sign it.  Routed to PCP    Maci Tavarez RN

## 2024-11-06 ENCOUNTER — TELEPHONE (OUTPATIENT)
Dept: FAMILY MEDICINE CLINIC | Age: 59
End: 2024-11-06

## 2024-11-06 DIAGNOSIS — Z79.4 TYPE 2 DIABETES MELLITUS WITH OTHER SPECIFIED COMPLICATION, WITH LONG-TERM CURRENT USE OF INSULIN (HCC): Primary | ICD-10-CM

## 2024-11-06 DIAGNOSIS — E11.69 TYPE 2 DIABETES MELLITUS WITH OTHER SPECIFIED COMPLICATION, WITH LONG-TERM CURRENT USE OF INSULIN (HCC): Primary | ICD-10-CM

## 2024-11-06 PROBLEM — E11.9 DIABETES MELLITUS (HCC): Status: ACTIVE | Noted: 2024-11-06

## 2024-11-06 NOTE — PROGRESS NOTES
No coverage for Mounjaro.  Will send Ozemp for possible coverage for treatment of type 2 diabetes.

## 2024-11-06 NOTE — TELEPHONE ENCOUNTER
The medication was DENIED; DENIAL letter is uploaded to MEDIA.    Generic Denial:  Other; please see Denial Letter.       Note :  utcome  Denied on October 22 by [employer] [facility] [employer] 2017  Coverage is provided when the member meets all the following: Member has had an inadequate clinical response (the inability to reach A1C goal after at least 120 days of current regimen, with use of two or more drugs concomitantly per ADA (American Diabetes Association) guidelines, documented adherence, and appropriate dose increases.       If you want an APPEAL; please note in this encounter what new information you would like to APPEAL with.  Once complete route back to PA POOL.    If this requires a response please respond to the pool ( P MHCX PSC MEDICATION PRE-AUTH).      Thank you please advise patient.

## 2024-11-06 NOTE — TELEPHONE ENCOUNTER
[facility] and patient is requesting an alternative if possible?

## 2024-11-08 ENCOUNTER — TELEPHONE (OUTPATIENT)
Dept: ADMINISTRATIVE | Age: 59
End: 2024-11-08

## 2024-11-08 NOTE — TELEPHONE ENCOUNTER
Received a PA request for Ozempic (0.25 or 0.5 MG/DOSE) 2MG/3ML pen-injectors.  [place] Medicaid wants to know what patient's A1c goal is.  And, if the goal is less than 7%, please list what the patient's disease state or condition is to support that goal.    Please respond to the pool ( P MHCX PSC MEDICATION PRE-AUTH).      Thank you!

## 2024-11-12 NOTE — TELEPHONE ENCOUNTER
Submitted PA for Ozempic (0.25 or 0.5 MG/DOSE) 2MG/3ML pen-injectors  Via CMM [id number] STATUS: PENDING.    Follow up done daily; if no decision with in three days we will refax.  If another three days goes by with no decision will call the insurance for status.

## 2024-11-13 NOTE — TELEPHONE ENCOUNTER
The medication was DENIED;     Generic Denial: Patient must complete step therapy, Unless there is a contraindication that you can provide.     Note :  Coverage is provided when the member meets all the following: Member has a history of at least 120 days of therapy with THREE preferred medications [ONE of the 120 day trials must be Byetta (5 mcg and 10 mcg), Victoza (18 MG/3 ML PEN)(Brand name is preferred by your plan) or Trulicity (0.75 mg, 1.5 mg, 3 mg and 4.5 mg)], which include but are not limited to: Farxiga 5 and 10 mg (Brand name is preferred by your plan), Glimepiride, Glipizide, Invokana 100 mg and 300 mg, Januvia, Jardiance 10 and 25 mg and Metformin IR and ER (generic of Glucophage XR) . A completed trial of metformin is noted and, Member has had an inadequate clinical response (the inability to reach A1C goal (less than 7%) after at least 120 days of current regimen, with use of two or more drugs concomitantly per ADA (American Diabetes Association) guidelines and, Member has documented adherence and appropriate dose escalation (must achieve maximum recommended dose or document that maximum recommended dose is not tolerated or is clinically inappropriate). Coverage is provided when the member meets all the following: Must provide documentation of medical necessity beyond convenience for patients inability to use individual drugs in the requested drug classes, such as Farxiga (brand preferred by the plan) and a preferred GLP-1 agonist such as Byetta and Victoza. The [facility] Policy for Medical Necessity as posted on the [facility] website and [facility] Preferred Drug List criteria were reviewed and per [place] Administrative Code Rule 5160-1-01 (C) and 5160-26-03 (B), a medically necessary service must include: generally accepted standards of medical practice, be clinically appropriate in administration, treatment and outcome and be the lowest cost alternative to effectively treat the condition. Please

## 2024-12-18 DIAGNOSIS — E78.1 HYPERTRIGLYCERIDEMIA: ICD-10-CM

## 2024-12-19 RX ORDER — PIOGLITAZONE 30 MG/1
30 TABLET ORAL DAILY
Qty: 30 TABLET | Refills: 10 | Status: SHIPPED | OUTPATIENT
Start: 2024-12-19

## 2024-12-19 RX ORDER — LISINOPRIL 5 MG/1
5 TABLET ORAL DAILY
Qty: 30 TABLET | Refills: 10 | Status: SHIPPED | OUTPATIENT
Start: 2024-12-19

## 2024-12-19 RX ORDER — OMEGA-3-ACID ETHYL ESTERS 1 G/1
2 CAPSULE, LIQUID FILLED ORAL 2 TIMES DAILY
Qty: 120 CAPSULE | Refills: 10 | Status: SHIPPED | OUTPATIENT
Start: 2024-12-19

## 2025-01-20 DIAGNOSIS — G62.9 NEUROPATHY: ICD-10-CM

## 2025-01-21 ENCOUNTER — OFFICE VISIT (OUTPATIENT)
Dept: FAMILY MEDICINE CLINIC | Age: 60
End: 2025-01-21
Payer: COMMERCIAL

## 2025-01-21 VITALS
RESPIRATION RATE: 16 BRPM | BODY MASS INDEX: 45.22 KG/M2 | HEIGHT: 63 IN | DIASTOLIC BLOOD PRESSURE: 78 MMHG | OXYGEN SATURATION: 98 % | SYSTOLIC BLOOD PRESSURE: 120 MMHG | HEART RATE: 66 BPM | WEIGHT: 255.2 LBS

## 2025-01-21 DIAGNOSIS — Z79.4 TYPE 2 DIABETES MELLITUS WITH OTHER SPECIFIED COMPLICATION, WITH LONG-TERM CURRENT USE OF INSULIN (HCC): Primary | ICD-10-CM

## 2025-01-21 DIAGNOSIS — E11.69 TYPE 2 DIABETES MELLITUS WITH OTHER SPECIFIED COMPLICATION, WITH LONG-TERM CURRENT USE OF INSULIN (HCC): Primary | ICD-10-CM

## 2025-01-21 DIAGNOSIS — J44.9 CHRONIC OBSTRUCTIVE PULMONARY DISEASE, UNSPECIFIED COPD TYPE (HCC): ICD-10-CM

## 2025-01-21 DIAGNOSIS — G62.9 NEUROPATHY: ICD-10-CM

## 2025-01-21 DIAGNOSIS — R74.01 TRANSAMINITIS: ICD-10-CM

## 2025-01-21 DIAGNOSIS — E78.1 HYPERTRIGLYCERIDEMIA: ICD-10-CM

## 2025-01-21 DIAGNOSIS — R74.8 ELEVATED ALKALINE PHOSPHATASE LEVEL: ICD-10-CM

## 2025-01-21 DIAGNOSIS — I10 ESSENTIAL HYPERTENSION: ICD-10-CM

## 2025-01-21 LAB
ALBUMIN SERPL-MCNC: 4 G/DL (ref 3.4–5)
ALBUMIN/GLOB SERPL: 1.8 {RATIO} (ref 1.1–2.2)
ALP SERPL-CCNC: 159 U/L (ref 40–129)
ALT SERPL-CCNC: 54 U/L (ref 10–40)
ANION GAP SERPL CALCULATED.3IONS-SCNC: 7 MMOL/L (ref 3–16)
AST SERPL-CCNC: 40 U/L (ref 15–37)
BASOPHILS # BLD: 0 K/UL (ref 0–0.2)
BASOPHILS NFR BLD: 0.8 %
BILIRUB SERPL-MCNC: 0.3 MG/DL (ref 0–1)
BUN SERPL-MCNC: 21 MG/DL (ref 7–20)
CALCIUM SERPL-MCNC: 9.4 MG/DL (ref 8.3–10.6)
CHLORIDE SERPL-SCNC: 103 MMOL/L (ref 99–110)
CO2 SERPL-SCNC: 28 MMOL/L (ref 21–32)
CREAT SERPL-MCNC: 0.9 MG/DL (ref 0.8–1.3)
DEPRECATED RDW RBC AUTO: 13.2 % (ref 12.4–15.4)
EOSINOPHIL # BLD: 0.1 K/UL (ref 0–0.6)
EOSINOPHIL NFR BLD: 1.6 %
GFR SERPLBLD CREATININE-BSD FMLA CKD-EPI: >90 ML/MIN/{1.73_M2}
GLUCOSE SERPL-MCNC: 127 MG/DL (ref 70–99)
HCT VFR BLD AUTO: 41.7 % (ref 40.5–52.5)
HGB BLD-MCNC: 14 G/DL (ref 13.5–17.5)
LYMPHOCYTES # BLD: 2 K/UL (ref 1–5.1)
LYMPHOCYTES NFR BLD: 30.6 %
MCH RBC QN AUTO: 32.4 PG (ref 26–34)
MCHC RBC AUTO-ENTMCNC: 33.6 G/DL (ref 31–36)
MCV RBC AUTO: 96.3 FL (ref 80–100)
MONOCYTES # BLD: 0.4 K/UL (ref 0–1.3)
MONOCYTES NFR BLD: 6.5 %
NEUTROPHILS # BLD: 3.9 K/UL (ref 1.7–7.7)
NEUTROPHILS NFR BLD: 60.5 %
PLATELET # BLD AUTO: 161 K/UL (ref 135–450)
PMV BLD AUTO: 10.3 FL (ref 5–10.5)
POTASSIUM SERPL-SCNC: 4.9 MMOL/L (ref 3.5–5.1)
PROT SERPL-MCNC: 6.2 G/DL (ref 6.4–8.2)
RBC # BLD AUTO: 4.33 M/UL (ref 4.2–5.9)
SODIUM SERPL-SCNC: 138 MMOL/L (ref 136–145)
WBC # BLD AUTO: 6.4 K/UL (ref 4–11)

## 2025-01-21 PROCEDURE — 3078F DIAST BP <80 MM HG: CPT | Performed by: STUDENT IN AN ORGANIZED HEALTH CARE EDUCATION/TRAINING PROGRAM

## 2025-01-21 PROCEDURE — 3074F SYST BP LT 130 MM HG: CPT | Performed by: STUDENT IN AN ORGANIZED HEALTH CARE EDUCATION/TRAINING PROGRAM

## 2025-01-21 PROCEDURE — 2022F DILAT RTA XM EVC RTNOPTHY: CPT | Performed by: STUDENT IN AN ORGANIZED HEALTH CARE EDUCATION/TRAINING PROGRAM

## 2025-01-21 PROCEDURE — 3023F SPIROM DOC REV: CPT | Performed by: STUDENT IN AN ORGANIZED HEALTH CARE EDUCATION/TRAINING PROGRAM

## 2025-01-21 PROCEDURE — 3046F HEMOGLOBIN A1C LEVEL >9.0%: CPT | Performed by: STUDENT IN AN ORGANIZED HEALTH CARE EDUCATION/TRAINING PROGRAM

## 2025-01-21 PROCEDURE — 4004F PT TOBACCO SCREEN RCVD TLK: CPT | Performed by: STUDENT IN AN ORGANIZED HEALTH CARE EDUCATION/TRAINING PROGRAM

## 2025-01-21 PROCEDURE — G8417 CALC BMI ABV UP PARAM F/U: HCPCS | Performed by: STUDENT IN AN ORGANIZED HEALTH CARE EDUCATION/TRAINING PROGRAM

## 2025-01-21 PROCEDURE — G8427 DOCREV CUR MEDS BY ELIG CLIN: HCPCS | Performed by: STUDENT IN AN ORGANIZED HEALTH CARE EDUCATION/TRAINING PROGRAM

## 2025-01-21 PROCEDURE — 36415 COLL VENOUS BLD VENIPUNCTURE: CPT | Performed by: STUDENT IN AN ORGANIZED HEALTH CARE EDUCATION/TRAINING PROGRAM

## 2025-01-21 PROCEDURE — 3017F COLORECTAL CA SCREEN DOC REV: CPT | Performed by: STUDENT IN AN ORGANIZED HEALTH CARE EDUCATION/TRAINING PROGRAM

## 2025-01-21 RX ORDER — OMEGA-3-ACID ETHYL ESTERS 1 G/1
2 CAPSULE, LIQUID FILLED ORAL 2 TIMES DAILY
Qty: 360 CAPSULE | Refills: 1 | Status: SHIPPED | OUTPATIENT
Start: 2025-01-21

## 2025-01-21 RX ORDER — LISINOPRIL 5 MG/1
5 TABLET ORAL DAILY
Qty: 90 TABLET | Refills: 1 | Status: SHIPPED | OUTPATIENT
Start: 2025-01-21

## 2025-01-21 RX ORDER — PIOGLITAZONE 30 MG/1
30 TABLET ORAL DAILY
Qty: 90 TABLET | Refills: 1 | Status: SHIPPED | OUTPATIENT
Start: 2025-01-21

## 2025-01-21 RX ORDER — GABAPENTIN 400 MG/1
CAPSULE ORAL
Qty: 150 CAPSULE | Refills: 2 | Status: SHIPPED | OUTPATIENT
Start: 2025-01-21 | End: 2025-04-21

## 2025-01-21 RX ORDER — FLUTICASONE FUROATE, UMECLIDINIUM BROMIDE AND VILANTEROL TRIFENATATE 200; 62.5; 25 UG/1; UG/1; UG/1
1 POWDER RESPIRATORY (INHALATION) DAILY
Qty: 3 EACH | Refills: 1 | Status: SHIPPED | OUTPATIENT
Start: 2025-01-21

## 2025-01-21 RX ORDER — GABAPENTIN 400 MG/1
CAPSULE ORAL
Qty: 150 CAPSULE | Refills: 10 | OUTPATIENT
Start: 2025-01-21

## 2025-01-21 SDOH — ECONOMIC STABILITY: FOOD INSECURITY: WITHIN THE PAST 12 MONTHS, YOU WORRIED THAT YOUR FOOD WOULD RUN OUT BEFORE YOU GOT MONEY TO BUY MORE.: NEVER TRUE

## 2025-01-21 SDOH — ECONOMIC STABILITY: FOOD INSECURITY: WITHIN THE PAST 12 MONTHS, THE FOOD YOU BOUGHT JUST DIDN'T LAST AND YOU DIDN'T HAVE MONEY TO GET MORE.: NEVER TRUE

## 2025-01-21 ASSESSMENT — PATIENT HEALTH QUESTIONNAIRE - PHQ9
1. LITTLE INTEREST OR PLEASURE IN DOING THINGS: NOT AT ALL
3. TROUBLE FALLING OR STAYING ASLEEP: NOT AT ALL
8. MOVING OR SPEAKING SO SLOWLY THAT OTHER PEOPLE COULD HAVE NOTICED. OR THE OPPOSITE, BEING SO FIGETY OR RESTLESS THAT YOU HAVE BEEN MOVING AROUND A LOT MORE THAN USUAL: NOT AT ALL
6. FEELING BAD ABOUT YOURSELF - OR THAT YOU ARE A FAILURE OR HAVE LET YOURSELF OR YOUR FAMILY DOWN: NOT AT ALL
9. THOUGHTS THAT YOU WOULD BE BETTER OFF DEAD, OR OF HURTING YOURSELF: NOT AT ALL
5. POOR APPETITE OR OVEREATING: NOT AT ALL
SUM OF ALL RESPONSES TO PHQ QUESTIONS 1-9: 0
7. TROUBLE CONCENTRATING ON THINGS, SUCH AS READING THE NEWSPAPER OR WATCHING TELEVISION: NOT AT ALL
4. FEELING TIRED OR HAVING LITTLE ENERGY: NOT AT ALL
SUM OF ALL RESPONSES TO PHQ9 QUESTIONS 1 & 2: 0
10. IF YOU CHECKED OFF ANY PROBLEMS, HOW DIFFICULT HAVE THESE PROBLEMS MADE IT FOR YOU TO DO YOUR WORK, TAKE CARE OF THINGS AT HOME, OR GET ALONG WITH OTHER PEOPLE: NOT DIFFICULT AT ALL
2. FEELING DOWN, DEPRESSED OR HOPELESS: NOT AT ALL
SUM OF ALL RESPONSES TO PHQ QUESTIONS 1-9: 0

## 2025-01-21 ASSESSMENT — ENCOUNTER SYMPTOMS
SHORTNESS OF BREATH: 0
COUGH: 0
RHINORRHEA: 0

## 2025-01-21 NOTE — PROGRESS NOTES
[facility]  2025    Kosta Pichardo (:  1965) is a 60 y.o. male, here for evaluation of the following medical concerns:    Chief Complaint   Patient presents with    Diabetes     Pt is here for a 3 month follow up         ASSESSMENT/ PLAN  Assessment & Plan  Type 2 diabetes mellitus with other specified complication, with long-term current use of insulin (HCC)   Chronic, at goal (stable), continue current treatment plan    Orders:    pioglitazone (ACTOS) 30 MG tablet; Take 1 tablet by mouth daily    CBC with Auto Differential; Future    Comprehensive Metabolic Panel; Future    Hemoglobin A1C; Future    Albumin/Creatinine Ratio, Urine    Hema Otero MD, Ophthalmology, [facility]    Hemoglobin A1C    Comprehensive Metabolic Panel    CBC with Auto Differential    Neuropathy   Chronic, at goal (stable), continue current treatment plan    Orders:    gabapentin (NEURONTIN) 400 MG capsule; TAKE TWO (2) CAPSULES BY MOUTH EVERY MORNING, 1 CAPSULE AT LUNCH, AND 2 CAPSULES AT BEDTIME    CBC with Auto Differential; Future    Comprehensive Metabolic Panel; Future    Hemoglobin A1C; Future    Albumin/Creatinine Ratio, Urine    Hemoglobin A1C    Comprehensive Metabolic Panel    CBC with Auto Differential    Hypertriglyceridemia   Chronic, at goal (stable), continue current treatment plan    Orders:    omega-3 acid ethyl esters (LOVAZA) 1 g capsule; Take 2 capsules by mouth 2 times daily    CBC with Auto Differential; Future    Comprehensive Metabolic Panel; Future    Hemoglobin A1C; Future    Albumin/Creatinine Ratio, Urine    Hemoglobin A1C    Comprehensive Metabolic Panel    CBC with Auto Differential    Chronic obstructive pulmonary disease, unspecified COPD type (HCC)   Chronic, at goal (stable), continue current treatment plan    Orders:    fluticasone-umeclidin-vilant (TRELEGY ELLIPTA) 200-62.5-25 MCG/ACT AEPB inhaler; Inhale 1 puff into the lungs daily    CBC with Auto

## 2025-01-21 NOTE — ASSESSMENT & PLAN NOTE
Chronic, at goal (stable), continue current treatment plan    Orders:    lisinopril (PRINIVIL;ZESTRIL) 5 MG tablet; Take 1 tablet by mouth daily    CBC with Auto Differential; Future    Comprehensive Metabolic Panel; Future    Hemoglobin A1C; Future    Albumin/Creatinine Ratio, Urine    Hemoglobin A1C    Comprehensive Metabolic Panel    CBC with Auto Differential

## 2025-01-21 NOTE — ASSESSMENT & PLAN NOTE
Chronic, at goal (stable), continue current treatment plan    Orders:    pioglitazone (ACTOS) 30 MG tablet; Take 1 tablet by mouth daily    CBC with Auto Differential; Future    Comprehensive Metabolic Panel; Future    Hemoglobin A1C; Future    Albumin/Creatinine Ratio, Urine    Hema Otero MD, Ophthalmology, [facility]    Hemoglobin A1C    Comprehensive Metabolic Panel    CBC with Auto Differential

## 2025-01-22 LAB
EST. AVERAGE GLUCOSE BLD GHB EST-MCNC: 139.9 MG/DL
HBA1C MFR BLD: 6.5 %

## 2025-02-07 ENCOUNTER — HOSPITAL ENCOUNTER (OUTPATIENT)
Dept: CT IMAGING | Age: 60
Discharge: HOME | End: 2025-02-07
Attending: STUDENT IN AN ORGANIZED HEALTH CARE EDUCATION/TRAINING PROGRAM
Payer: COMMERCIAL

## 2025-02-07 DIAGNOSIS — Z72.0 TOBACCO ABUSE: ICD-10-CM

## 2025-02-07 PROCEDURE — 71271 CT THORAX LUNG CANCER SCR C-: CPT

## 2025-02-08 ENCOUNTER — HOSPITAL ENCOUNTER (OUTPATIENT)
Dept: ULTRASOUND IMAGING | Age: 60
Discharge: HOME | End: 2025-02-07
Attending: STUDENT IN AN ORGANIZED HEALTH CARE EDUCATION/TRAINING PROGRAM
Payer: COMMERCIAL

## 2025-02-08 DIAGNOSIS — R74.01 TRANSAMINITIS: ICD-10-CM

## 2025-02-08 DIAGNOSIS — R74.8 ELEVATED ALKALINE PHOSPHATASE LEVEL: ICD-10-CM

## 2025-02-18 DIAGNOSIS — E11.69 TYPE 2 DIABETES MELLITUS WITH OTHER SPECIFIED COMPLICATION, WITH LONG-TERM CURRENT USE OF INSULIN (HCC): ICD-10-CM

## 2025-02-18 DIAGNOSIS — G62.9 NEUROPATHY: ICD-10-CM

## 2025-02-18 DIAGNOSIS — Z79.4 DIABETES MELLITUS DUE TO UNDERLYING CONDITION WITH HYPERGLYCEMIA, WITH LONG-TERM CURRENT USE OF INSULIN (HCC): ICD-10-CM

## 2025-02-18 DIAGNOSIS — Z79.4 TYPE 2 DIABETES MELLITUS WITH OTHER SPECIFIED COMPLICATION, WITH LONG-TERM CURRENT USE OF INSULIN (HCC): ICD-10-CM

## 2025-02-18 DIAGNOSIS — E08.65 DIABETES MELLITUS DUE TO UNDERLYING CONDITION WITH HYPERGLYCEMIA, WITH LONG-TERM CURRENT USE OF INSULIN (HCC): ICD-10-CM

## 2025-02-19 ENCOUNTER — HOSPITAL ENCOUNTER (EMERGENCY)
Age: 60
Discharge: HOME | End: 2025-02-19
Attending: EMERGENCY MEDICINE
Payer: COMMERCIAL

## 2025-02-19 VITALS
HEART RATE: 79 BPM | TEMPERATURE: 98.3 F | BODY MASS INDEX: 44.53 KG/M2 | RESPIRATION RATE: 17 BRPM | WEIGHT: 251.4 LBS | DIASTOLIC BLOOD PRESSURE: 78 MMHG | SYSTOLIC BLOOD PRESSURE: 135 MMHG | OXYGEN SATURATION: 97 %

## 2025-02-19 DIAGNOSIS — G57.01 PIRIFORMIS SYNDROME OF RIGHT SIDE: Primary | ICD-10-CM

## 2025-02-19 PROCEDURE — 96372 THER/PROPH/DIAG INJ SC/IM: CPT

## 2025-02-19 PROCEDURE — 99284 EMERGENCY DEPT VISIT MOD MDM: CPT

## 2025-02-19 PROCEDURE — 6360000002 HC RX W HCPCS

## 2025-02-19 RX ORDER — KETOROLAC TROMETHAMINE 30 MG/ML
60 INJECTION, SOLUTION INTRAMUSCULAR; INTRAVENOUS ONCE
Status: COMPLETED | OUTPATIENT
Start: 2025-02-19 | End: 2025-02-19

## 2025-02-19 RX ORDER — ACYCLOVIR 400 MG/1
TABLET ORAL
Qty: 3 EACH | Refills: 0 | Status: SHIPPED | OUTPATIENT
Start: 2025-02-19 | End: 2025-03-19

## 2025-02-19 RX ORDER — NAPROXEN 500 MG/1
500 TABLET ORAL 2 TIMES DAILY PRN
Qty: 28 TABLET | Refills: 0 | Status: SHIPPED | OUTPATIENT
Start: 2025-02-19 | End: 2025-03-05

## 2025-02-19 RX ORDER — METHYLPREDNISOLONE 4 MG/1
TABLET ORAL
Qty: 5 TABLET | Refills: 0 | Status: SHIPPED | OUTPATIENT
Start: 2025-02-19 | End: 2025-02-25

## 2025-02-19 RX ADMIN — KETOROLAC TROMETHAMINE 60 MG: 60 INJECTION, SOLUTION INTRAMUSCULAR at 11:29

## 2025-02-19 ASSESSMENT — PAIN - FUNCTIONAL ASSESSMENT: PAIN_FUNCTIONAL_ASSESSMENT: 0-10

## 2025-02-19 ASSESSMENT — PAIN SCALES - GENERAL: PAINLEVEL_OUTOF10: 9

## 2025-02-19 ASSESSMENT — ENCOUNTER SYMPTOMS: BACK PAIN: 1

## 2025-02-19 NOTE — ED PROVIDER NOTES
Emergency Department RESIDENT Physician Note  Location: [facility] EMERGENCY DEPARTMENT  2/19/2025       Pt Name: Kosta Pichardo  MRN: [id number]  Birthdate 1965    Date of evaluation: 2/19/2025  Provider: Niya Gamble DO  PCP: Ajit Davalos DO    Note Started: 10:45 AM EST 2/19/25    CHIEF COMPLAINT  Chief Complaint   Patient presents with    Abscess     On right buttocks, started a few weeks ago,        ROOM: T3    HISTORY OF PRESENT ILLNESS:  History obtained by patient. Limitations to history : None.     Kosta Pichardo is a 60 y.o. male with a significant PMHx of diabetes, hypertension, hyperlipidemia, LIANE.  Patient reports that 2 weeks ago he was cutting down windows with a multi tool when he felt like he pulled a muscle in his lower back.  He reports worsening pain with sitting down ambulation. Denies any changes in bowel/bladder, shortness of breath, abdominal pain.       Nursing Notes were all reviewed and agreed with or any disagreements were addressed in the HPI.      MEDICAL HISTORY  Past Medical History:   Diagnosis Date    Asthma     Diabetes mellitus (HCC)     Hyperlipidemia     Hypertension     Lateral epicondylitis of left elbow 8/22/2019    Microalbuminuria     LIANE on CPAP     LIANE treated with BiPAP     LIANE treated with BiPAP     SOB (shortness of breath)     Umbilical hernia         SURGICAL HISTORY  Past Surgical History:   Procedure Laterality Date    COLONOSCOPY      polyp, hemorroids    HERNIA REPAIR  11/15/2013    open umbilical hernia repair with mesh    OTHER SURGICAL HISTORY      spleen laceration repair 1999    UMBILICAL HERNIA REPAIR         CURRENT MEDICATIONS  Discharge Medication List as of 2/19/2025 11:30 AM        CONTINUE these medications which have NOT CHANGED    Details   Continuous Glucose Sensor (DEXCOM G7 SENSOR) MISC USE TO MONITOR BLOOD SUGAR. CHANGE SENSOR EVERY 10 DAYS, Disp-3 each, R-0Normal      fluticasone-umeclidin-vilant (TRELEGY ELLIPTA)

## 2025-02-20 ENCOUNTER — HOSPITAL ENCOUNTER (OUTPATIENT)
Dept: PHYSICAL THERAPY | Age: 60
Setting detail: THERAPIES SERIES
Discharge: HOME | End: 2025-02-20
Payer: COMMERCIAL

## 2025-02-20 PROCEDURE — 97140 MANUAL THERAPY 1/> REGIONS: CPT

## 2025-02-20 PROCEDURE — 97161 PT EVAL LOW COMPLEX 20 MIN: CPT

## 2025-02-20 NOTE — THERAPY EVALUATION
[facility] - Outpatient Rehabilitation and Therapy: [address] [place] [address] office: [phone] fax: [phone]     Physical Therapy Initial Evaluation Certification      Dear Niya Gamble DO ,    We had the pleasure of evaluating the following patient for physical therapy services at [facility] Outpatient Physical Therapy.  A summary of our findings can be found in the initial assessment below.  This includes our plan of care.  If you have any questions or concerns regarding these findings, please do not hesitate to contact me at the office phone number listed above.  Thank you for the referral.     Physician Signature:_______________________________Date:__________________  By signing above (or electronic signature), therapist’s plan is approved by physician       Physical Therapy: TREATMENT/PROGRESS NOTE   Patient: Kosta Pichardo (60 y.o. male)  \"Giles\" Examination Date: 2025   :  1965 MRN: [id number]   Visit #: 1   Insurance Allowable Auth Needed    [x]Yes    []No    Insurance: Payor: [employer] PLAN / Plan: [email or web address] / Product Type: *No Product type* /   Insurance ID: [id number] - (Medicaid Managed)  Secondary Insurance (if applicable):    Treatment Diagnosis:   No diagnosis found.   Medical Diagnosis:  Piriformis syndrome of right side [G57.01]   Referring Physician: iNya Gamble DO  PCP: Ajit Davalos DO     Plan of care signed (Y/N):     Date of Patient follow up with Physician:      Plan of Care Report: EVAL today  POC update due: (10 visits /OR AUTH LIMITS, whichever is less)  3/21/2025                                             Medical History:  Comorbidities:  Diabetes (Type I or II)  Hypertension  Relevant Medical History: See medical chart                                         Precautions/ Contra-indications:           Latex allergy:  NO  Pacemaker:    NO  Contraindications for Manipulation:

## 2025-02-21 NOTE — ED PROVIDER NOTES
[facility] EMERGENCY DEPARTMENT     EMERGENCY DEPARTMENT ENCOUNTER     Location: [facility] EMERGENCY DEPARTMENT  2/19/2025  Note Started: 11:29 AM EST 2/21/25      Patient Identification  Kosta Pichardo is a 60 y.o. male      HPI:Kosta Pichardo was evaluated in the Emergency Department for R buttocks/ leg pain.  Patient states that 2 weeks ago he was bending over working and woke up the next day with significant pain in his right lower back/buttocks that extends down the posterior aspect of his thigh to the knee.  Injury is atraumatic.  Patient denies muscle weakness, paresthesias, or bowel/bladder incontinence.  No abdominal pain.  Pain is rated as 9/10.  Patient has been taking Excedrin without significant relief.  Although initial history and physical exam information was obtained by ELEONORA/NPP/MD/ (who also dictated a record of this visit), I personally saw the patient and performed and made/approved the management plan and take responsibility for the patient management.      PHYSICAL EXAM:      General: No acute distress.  Head: Normocephalic/atraumatic.  Heart: Regular rhythm.  Normal S1-S2.  Lungs: Clear to auscultation bilaterally.  No wheezes, rales, rhonchi.  Abdomen: Soft.  Musculoskeletal: No midline tenderness of cervical, thoracic, or lumbar spine.  Patient has point tenderness of the right sacroiliac joint as well as severe pain and tenderness over the right piriformis tender point.  Range of motion of bilateral upper and lower extremities.  Neurologic: Normal sensation.  Muscle strength 5/5 in bilateral upper and lower flexion and extension.  No evidence of bowel or bladder incontinence.  No saddle anesthesia.  Negative straight leg test on the right.  Patellar reflexes are brisk bilaterally.  Sensation intact.    Patient seen and evaluated.  Relevant records reviewed.  MDM    Patient presents to the Emergency Department complaining of atraumatic right hip/buttocks pain.    I

## 2025-02-25 ENCOUNTER — APPOINTMENT (OUTPATIENT)
Dept: PHYSICAL THERAPY | Age: 60
End: 2025-02-25
Payer: COMMERCIAL

## 2025-03-03 ENCOUNTER — HOSPITAL ENCOUNTER (OUTPATIENT)
Dept: PHYSICAL THERAPY | Age: 60
Setting detail: THERAPIES SERIES
Discharge: HOME | End: 2025-03-03
Payer: COMMERCIAL

## 2025-03-03 PROCEDURE — 97140 MANUAL THERAPY 1/> REGIONS: CPT

## 2025-03-03 PROCEDURE — 97110 THERAPEUTIC EXERCISES: CPT

## 2025-03-03 NOTE — FLOWSHEET NOTE
[facility] - Outpatient Rehabilitation and Therapy: [address] [place] [address] office: [phone] fax: [phone]     Physical Therapy Daily Note      Dear Niya Gamble DO ,    We had the pleasure of evaluating the following patient for physical therapy services at [facility] Outpatient Physical Therapy.  A summary of our findings can be found in the initial assessment below.  This includes our plan of care.  If you have any questions or concerns regarding these findings, please do not hesitate to contact me at the office phone number listed above.  Thank you for the referral.     Physician Signature:_______________________________Date:__________________  By signing above (or electronic signature), therapist’s plan is approved by physician       Physical Therapy: TREATMENT/PROGRESS NOTE   Patient: Kosta Pichardo (60 y.o. male)  \"Giles\" Examination Date: 2025   :  1965 MRN: [id number]   Visit #: 2   Insurance Allowable Auth Needed    [x]Yes    []No    Insurance: Payor: [employer] PLAN / Plan: [employer] / Product Type: *No Product type* /   Insurance ID: [id number] - (Medicaid Managed)  Secondary Insurance (if applicable):    Treatment Diagnosis:   No diagnosis found.   Medical Diagnosis:  Piriformis syndrome of right side [G57.01]   Referring Physician: Niya Gamble DO  PCP: Ajit Davalos DO     Plan of care signed (Y/N):     Date of Patient follow up with Physician:      Plan of Care Report: EVAL today  POC update due: (10 visits /OR AUTH LIMITS, whichever is less)  3/21/2025                                             Medical History:  Comorbidities:  Diabetes (Type I or II)  Hypertension  Relevant Medical History: See medical chart                                         Precautions/ Contra-indications:           Latex allergy:  NO  Pacemaker:    NO  Contraindications for Manipulation: None  Date of Surgery:

## 2025-03-04 ENCOUNTER — TELEPHONE (OUTPATIENT)
Dept: PULMONOLOGY | Age: 60
End: 2025-03-04

## 2025-03-04 NOTE — TELEPHONE ENCOUNTER
BiPAP report from 2/2/2025 - 3/3/2025 on BiPAP 20/15 cm H2O reviewed.  Compliance is good 100%.  AHI is good 1.2    Numbers from download report look good however I did change pressure via Airview to BiPAP 21/16.  Patient should call back if he continues to have trouble with BiPAP pressure and should see his PCP if having shortness of breath.    Please get download report in about a month

## 2025-03-04 NOTE — TELEPHONE ENCOUNTER
Pt called stating he is waking up with having a hard time breathing at night only while using his CPAP. He said he not having any issues during the day.

## 2025-03-05 ENCOUNTER — APPOINTMENT (OUTPATIENT)
Dept: PHYSICAL THERAPY | Age: 60
End: 2025-03-05
Payer: COMMERCIAL

## 2025-03-10 ENCOUNTER — APPOINTMENT (OUTPATIENT)
Dept: PHYSICAL THERAPY | Age: 60
End: 2025-03-10
Payer: COMMERCIAL

## 2025-03-12 ENCOUNTER — HOSPITAL ENCOUNTER (OUTPATIENT)
Dept: PHYSICAL THERAPY | Age: 60
Setting detail: THERAPIES SERIES
Discharge: HOME | End: 2025-03-12
Payer: COMMERCIAL

## 2025-03-12 PROCEDURE — 97110 THERAPEUTIC EXERCISES: CPT

## 2025-03-12 PROCEDURE — 97140 MANUAL THERAPY 1/> REGIONS: CPT

## 2025-03-12 NOTE — FLOWSHEET NOTE
[facility] - Outpatient Rehabilitation and Therapy: [address] [place] [address] office: [phone] fax: [phone]     Physical Therapy Daily Note      Dear Niya Gamble DO ,    We had the pleasure of evaluating the following patient for physical therapy services at [facility] Outpatient Physical Therapy.  A summary of our findings can be found in the initial assessment below.  This includes our plan of care.  If you have any questions or concerns regarding these findings, please do not hesitate to contact me at the office phone number listed above.  Thank you for the referral.     Physician Signature:_______________________________Date:__________________  By signing above (or electronic signature), therapist’s plan is approved by physician       Physical Therapy: TREATMENT/PROGRESS NOTE   Patient: Kosta Pichardo (60 y.o. male)  \"Giles\" Examination Date: 2025   :  1965 MRN: [id number]   Visit #: 3   Insurance Allowable Auth Needed    [x]Yes    []No    Insurance: Payor: [employer] PLAN / Plan: [employer] / Product Type: *No Product type* /   Insurance ID: [id number] - (Medicaid Managed)  Secondary Insurance (if applicable):    Treatment Diagnosis:   No diagnosis found.   Medical Diagnosis:  Piriformis syndrome of right side [G57.01]   Referring Physician: Niya Gamble DO  PCP: Ajit Davalos DO     Plan of care signed (Y/N):     Date of Patient follow up with Physician:      Plan of Care Report: EVAL today  POC update due: (10 visits /OR AUTH LIMITS, whichever is less)  3/21/2025                                             Medical History:  Comorbidities:  Diabetes (Type I or II)  Hypertension  Relevant Medical History: See medical chart                                         Precautions/ Contra-indications:           Latex allergy:  NO  Pacemaker:    NO  Contraindications for Manipulation: None  Date of Surgery: 
Right hand/hand

## 2025-03-17 ENCOUNTER — TELEPHONE (OUTPATIENT)
Dept: PHYSICAL THERAPY | Age: 60
End: 2025-03-17

## 2025-03-17 NOTE — TELEPHONE ENCOUNTER
Called referring provider's office to request review, sign and return of outpatient rehabilitation plan of care. Spoke with Kaitlin.   Comments (if applicable):    Patient saw Dr. Gamble in ER, she does not sign POCs. The POC will need to be sent to Dr. Ajit Davalos, the patient's PCP.

## 2025-03-20 RX ORDER — METOPROLOL TARTRATE 50 MG
50 TABLET ORAL 2 TIMES DAILY
Qty: 60 TABLET | Refills: 0 | Status: SHIPPED | OUTPATIENT
Start: 2025-03-20

## 2025-03-20 RX ORDER — ATORVASTATIN CALCIUM 40 MG/1
40 TABLET, FILM COATED ORAL
Qty: 30 TABLET | Refills: 0 | Status: SHIPPED | OUTPATIENT
Start: 2025-03-20

## 2025-03-20 RX ORDER — DICLOFENAC SODIUM 75 MG/1
75 TABLET, DELAYED RELEASE ORAL 2 TIMES DAILY
Qty: 60 TABLET | Refills: 0 | Status: SHIPPED | OUTPATIENT
Start: 2025-03-20

## 2025-03-21 DIAGNOSIS — Z79.4 TYPE 2 DIABETES MELLITUS WITH OTHER SPECIFIED COMPLICATION, WITH LONG-TERM CURRENT USE OF INSULIN: ICD-10-CM

## 2025-03-21 DIAGNOSIS — G62.9 NEUROPATHY: ICD-10-CM

## 2025-03-21 DIAGNOSIS — Z79.4 DIABETES MELLITUS DUE TO UNDERLYING CONDITION WITH HYPERGLYCEMIA, WITH LONG-TERM CURRENT USE OF INSULIN (HCC): ICD-10-CM

## 2025-03-21 DIAGNOSIS — E11.69 TYPE 2 DIABETES MELLITUS WITH OTHER SPECIFIED COMPLICATION, WITH LONG-TERM CURRENT USE OF INSULIN: ICD-10-CM

## 2025-03-21 DIAGNOSIS — E08.65 DIABETES MELLITUS DUE TO UNDERLYING CONDITION WITH HYPERGLYCEMIA, WITH LONG-TERM CURRENT USE OF INSULIN (HCC): ICD-10-CM

## 2025-03-24 ENCOUNTER — HOSPITAL ENCOUNTER (OUTPATIENT)
Dept: PHYSICAL THERAPY | Age: 60
Setting detail: THERAPIES SERIES
End: 2025-03-24
Payer: COMMERCIAL

## 2025-03-24 RX ORDER — ACYCLOVIR 400 MG/1
TABLET ORAL
Qty: 3 EACH | Refills: 11 | Status: SHIPPED | OUTPATIENT
Start: 2025-03-24 | End: 2025-04-21

## 2025-03-26 ENCOUNTER — APPOINTMENT (OUTPATIENT)
Dept: PHYSICAL THERAPY | Age: 60
End: 2025-03-26
Payer: COMMERCIAL

## 2025-04-18 DIAGNOSIS — G62.9 NEUROPATHY: ICD-10-CM

## 2025-04-18 RX ORDER — GABAPENTIN 400 MG/1
CAPSULE ORAL
Qty: 150 CAPSULE | Refills: 0 | Status: SHIPPED | OUTPATIENT
Start: 2025-04-18 | End: 2025-05-18

## 2025-04-18 RX ORDER — ATORVASTATIN CALCIUM 40 MG/1
40 TABLET, FILM COATED ORAL
Qty: 30 TABLET | Refills: 0 | Status: SHIPPED | OUTPATIENT
Start: 2025-04-18

## 2025-04-18 RX ORDER — DICLOFENAC SODIUM 75 MG/1
75 TABLET, DELAYED RELEASE ORAL 2 TIMES DAILY
Qty: 60 TABLET | Refills: 0 | Status: SHIPPED | OUTPATIENT
Start: 2025-04-18

## 2025-04-18 RX ORDER — METOPROLOL TARTRATE 50 MG
50 TABLET ORAL 2 TIMES DAILY
Qty: 60 TABLET | Refills: 0 | Status: SHIPPED | OUTPATIENT
Start: 2025-04-18

## 2025-05-19 RX ORDER — ATORVASTATIN CALCIUM 40 MG/1
40 TABLET, FILM COATED ORAL
Qty: 30 TABLET | Refills: 0 | Status: SHIPPED | OUTPATIENT
Start: 2025-05-19

## 2025-05-19 RX ORDER — METOPROLOL TARTRATE 50 MG
50 TABLET ORAL 2 TIMES DAILY
Qty: 60 TABLET | Refills: 0 | Status: SHIPPED | OUTPATIENT
Start: 2025-05-19

## 2025-05-19 RX ORDER — BUPROPION HYDROCHLORIDE 150 MG/1
300 TABLET ORAL EVERY MORNING
Qty: 60 TABLET | Refills: 0 | Status: SHIPPED | OUTPATIENT
Start: 2025-05-19

## 2025-05-19 RX ORDER — DICLOFENAC SODIUM 75 MG/1
75 TABLET, DELAYED RELEASE ORAL 2 TIMES DAILY
Qty: 60 TABLET | Refills: 0 | Status: SHIPPED | OUTPATIENT
Start: 2025-05-19

## 2025-06-17 DIAGNOSIS — J44.9 CHRONIC OBSTRUCTIVE PULMONARY DISEASE, UNSPECIFIED COPD TYPE (HCC): ICD-10-CM

## 2025-06-18 RX ORDER — DICLOFENAC SODIUM 75 MG/1
75 TABLET, DELAYED RELEASE ORAL 2 TIMES DAILY
Qty: 60 TABLET | Refills: 0 | Status: SHIPPED | OUTPATIENT
Start: 2025-06-18

## 2025-06-18 RX ORDER — ATORVASTATIN CALCIUM 40 MG/1
40 TABLET, FILM COATED ORAL
Qty: 30 TABLET | Refills: 1 | Status: SHIPPED | OUTPATIENT
Start: 2025-06-18

## 2025-06-18 RX ORDER — BUPROPION HYDROCHLORIDE 150 MG/1
300 TABLET ORAL EVERY MORNING
Qty: 60 TABLET | Refills: 1 | Status: SHIPPED | OUTPATIENT
Start: 2025-06-18

## 2025-06-18 RX ORDER — METOPROLOL TARTRATE 50 MG
50 TABLET ORAL 2 TIMES DAILY
Qty: 60 TABLET | Refills: 1 | Status: SHIPPED | OUTPATIENT
Start: 2025-06-18

## 2025-06-18 RX ORDER — FLUTICASONE FUROATE, UMECLIDINIUM BROMIDE AND VILANTEROL TRIFENATATE 200; 62.5; 25 UG/1; UG/1; UG/1
1 POWDER RESPIRATORY (INHALATION) DAILY
Qty: 1 EACH | Refills: 5 | Status: SHIPPED | OUTPATIENT
Start: 2025-06-18

## 2025-06-21 ENCOUNTER — HOSPITAL ENCOUNTER (EMERGENCY)
Age: 60
Discharge: HOME | End: 2025-06-21
Payer: COMMERCIAL

## 2025-06-21 VITALS
HEART RATE: 76 BPM | DIASTOLIC BLOOD PRESSURE: 61 MMHG | BODY MASS INDEX: 43.22 KG/M2 | OXYGEN SATURATION: 98 % | TEMPERATURE: 99 F | SYSTOLIC BLOOD PRESSURE: 116 MMHG | RESPIRATION RATE: 16 BRPM | WEIGHT: 244 LBS

## 2025-06-21 DIAGNOSIS — M54.32 SCIATICA OF LEFT SIDE: Primary | ICD-10-CM

## 2025-06-21 PROCEDURE — 99284 EMERGENCY DEPT VISIT MOD MDM: CPT

## 2025-06-21 PROCEDURE — 6370000000 HC RX 637 (ALT 250 FOR IP): Performed by: NURSE PRACTITIONER

## 2025-06-21 PROCEDURE — 96372 THER/PROPH/DIAG INJ SC/IM: CPT

## 2025-06-21 PROCEDURE — 6360000002 HC RX W HCPCS: Performed by: NURSE PRACTITIONER

## 2025-06-21 RX ORDER — PREDNISONE 10 MG/1
40 TABLET ORAL DAILY
Qty: 16 TABLET | Refills: 0 | Status: SHIPPED | OUTPATIENT
Start: 2025-06-21 | End: 2025-06-25

## 2025-06-21 RX ORDER — ORPHENADRINE CITRATE 30 MG/ML
60 INJECTION INTRAMUSCULAR; INTRAVENOUS ONCE
Status: COMPLETED | OUTPATIENT
Start: 2025-06-21 | End: 2025-06-21

## 2025-06-21 RX ORDER — PREDNISONE 20 MG/1
60 TABLET ORAL ONCE
Status: COMPLETED | OUTPATIENT
Start: 2025-06-21 | End: 2025-06-21

## 2025-06-21 RX ORDER — KETOROLAC TROMETHAMINE 30 MG/ML
15 INJECTION, SOLUTION INTRAMUSCULAR; INTRAVENOUS ONCE
Status: COMPLETED | OUTPATIENT
Start: 2025-06-21 | End: 2025-06-21

## 2025-06-21 RX ADMIN — KETOROLAC TROMETHAMINE 15 MG: 30 INJECTION, SOLUTION INTRAMUSCULAR at 10:30

## 2025-06-21 RX ADMIN — ORPHENADRINE CITRATE 60 MG: 60 INJECTION INTRAMUSCULAR; INTRAVENOUS at 10:29

## 2025-06-21 RX ADMIN — PREDNISONE 60 MG: 20 TABLET ORAL at 10:28

## 2025-06-21 ASSESSMENT — PAIN SCALES - GENERAL: PAINLEVEL_OUTOF10: 10

## 2025-06-21 ASSESSMENT — PAIN - FUNCTIONAL ASSESSMENT
PAIN_FUNCTIONAL_ASSESSMENT: 0-10
PAIN_FUNCTIONAL_ASSESSMENT: ACTIVITIES ARE NOT PREVENTED

## 2025-06-21 ASSESSMENT — PAIN DESCRIPTION - LOCATION: LOCATION: LEG

## 2025-06-21 ASSESSMENT — PAIN DESCRIPTION - DESCRIPTORS: DESCRIPTORS: ACHING;SHARP

## 2025-06-21 ASSESSMENT — PAIN DESCRIPTION - ORIENTATION: ORIENTATION: LEFT

## 2025-06-21 ASSESSMENT — PAIN DESCRIPTION - PAIN TYPE: TYPE: ACUTE PAIN

## 2025-06-21 NOTE — ED PROVIDER NOTES
[facility] EMERGENCY DEPARTMENT  EMERGENCY DEPARTMENT ENCOUNTER        Pt Name: Kosta Pichardo  MRN: [id number]  Birthdate 1965  Date of evaluation: 6/21/2025  Provider: NICK [place] - [place]  PCP: Ajit Davalos DO  Note Started: 10:10 AM EDT 6/21/25    Evaluated by ELEONORA. I have evaluated this patient.  My supervising physician was available for consultation.      CHIEF COMPLAINT       Chief Complaint   Patient presents with    Leg Pain     Patient reports pain from left gluteal to below knee. Patient reports past hx of sciatica to same side pain is 10 on 1-10 scale, no medication taken prior to arrival        HISTORY OF PRESENT ILLNESS: 1 or more Elements     History From: Patient  Limitations to history : None    Kosta Pichardo is a 60 y.o. male who presents to ER with left leg pain. His left leg from his buttock down to his knee hurts. Going on for 2 weeks. Many years ago he had sciatic problems and had a shot and it felt better.  Does report that his current pain feels similar.  Current pain is down the side of his leg into the calf. It hurts going up stairs, lifting his leg up, standing up from sitting. Has used Vicks and it helps but wears off and has taken excedrin without much relief. Reports he is allergic to ibuprofen, it burns his stomach up. He is diabetic. Has been on steroids in the past. States his blood sugars have been good recently.  He denies abdominal pain.  Denies numbness or tingling into the left leg worse than his typical neuropathy.  Denies saddle anesthesia.  Denies bowel or bladder dysfunction.    Nursing Notes were all reviewed and agreed with or any disagreements were addressed in the HPI.    REVIEW OF SYSTEMS :      Review of Systems    Positives and Pertinent negatives as per HPI.     MEDICAL HISTORY     Past Medical History:   Diagnosis Date    Asthma     Diabetes mellitus (HCC)     Hyperlipidemia     Hypertension     Lateral epicondylitis of left  elbow 8/22/2019    Microalbuminuria     LIANE on CPAP     LIANE treated with BiPAP     LIANE treated with BiPAP     SOB (shortness of breath)     Umbilical hernia        SURGICAL HISTORY     Past Surgical History:   Procedure Laterality Date    COLONOSCOPY      polyp, hemorroids    HERNIA REPAIR  11/15/2013    open umbilical hernia repair with mesh    OTHER SURGICAL HISTORY      spleen laceration repair 1999    UMBILICAL HERNIA REPAIR         CURRENTMEDICATIONS       Previous Medications    ATORVASTATIN (LIPITOR) 40 MG TABLET    TAKE 1 TABLET BY MOUTH NIGHTLY    BLOOD GLUCOSE MONITORING SUPPL (TRUE METRIX METER) W/DEVICE KIT    USE DAILY TO CHECK BLOOD SUGAR    BLOOD GLUCOSE MONITORING SUPPL (TRUE METRIX METER) W/DEVICE KIT    USE DAILY TO CHECK BLOOD SUGAR    BUPROPION (WELLBUTRIN XL) 150 MG EXTENDED RELEASE TABLET    TAKE 2 TABLETS BY MOUTH EVERY MORNING    CHOLECALCIFEROL 1.25 MG (73641 UT) TABS    Take 1 tablet by mouth once a week    COBALAMIN COMBINATIONS (B-12) 1000-400 MCG SUBL    Place 1,000 mcg under the tongue daily    CONTINUOUS GLUCOSE  (DEXCOM G7 ) LAWRENCE    1 each by Does not apply route continuous    DICLOFENAC (VOLTAREN) 75 MG EC TABLET    TAKE ONE (1) TABLET BY MOUTH TWICE DAILY    EASY TOUCH LANCETS 30G MISC    1 each by Does not apply route in the morning, at noon, in the evening, and at bedtime    EASY TOUCH LANCETS 30G/TWIST MISC    USE TO TEST BLOOD SUGAR ONCE DAILY    EPINEPHRINE (EPIPEN 2-LINDA) 0.3 MG/0.3ML SOAJ INJECTION    Inject 0.3 mLs into the muscle daily as needed (Allergic reaction) Use as directed for allergic reaction, immediately call 911 or come to the emergency room if you ever need to use this medication.    FENOFIBRATE (TRIGLIDE) 160 MG TABLET    TAKE 1 TABLET BY MOUTH ONCE DAILY    FLUTICASONE (FLONASE) 50 MCG/ACT NASAL SPRAY    USE 1 SPRAY IN EACH NOSTRIL DAILY    FLUTICASONE-UMECLIDIN-VILANT (TRELEGY ELLIPTA) 200-62.5-25 MCG/ACT AEPB INHALER    INHALE 1 PUFF INTO THE

## 2025-07-18 RX ORDER — DICLOFENAC SODIUM 75 MG/1
75 TABLET, DELAYED RELEASE ORAL 2 TIMES DAILY
Qty: 60 TABLET | Refills: 11 | OUTPATIENT
Start: 2025-07-18

## 2025-07-18 RX ORDER — INSULIN GLARGINE 100 [IU]/ML
INJECTION, SOLUTION SUBCUTANEOUS
Qty: 15 ML | Refills: 0 | Status: SHIPPED | OUTPATIENT
Start: 2025-07-18

## 2025-07-18 NOTE — TELEPHONE ENCOUNTER
LAST REFILL 06/18/25  AMOUNT 60     0 REFILLS  LAST VISIT Visit date not found  NEXT VISIT   Future Appointments   Date Time Provider Department Center   7/23/2025  8:00 AM Robin Zavala DO [place] [facility] DEP   9/3/2025  8:40 AM Brooke Oneal APRN - CNP [facility]     Duplicate request

## 2025-07-25 DIAGNOSIS — G62.9 NEUROPATHY: ICD-10-CM

## 2025-07-28 RX ORDER — GABAPENTIN 400 MG/1
CAPSULE ORAL
Qty: 150 CAPSULE | Refills: 11 | OUTPATIENT
Start: 2025-07-28

## 2025-08-19 RX ORDER — INSULIN GLARGINE 100 [IU]/ML
INJECTION, SOLUTION SUBCUTANEOUS
Qty: 15 ML | Refills: 11 | OUTPATIENT
Start: 2025-08-19

## 2025-09-03 ENCOUNTER — OFFICE VISIT (OUTPATIENT)
Dept: SLEEP MEDICINE | Age: 60
End: 2025-09-03
Payer: COMMERCIAL

## 2025-09-03 VITALS
HEART RATE: 74 BPM | OXYGEN SATURATION: 97 % | HEIGHT: 63 IN | WEIGHT: 245.4 LBS | SYSTOLIC BLOOD PRESSURE: 110 MMHG | BODY MASS INDEX: 43.48 KG/M2 | DIASTOLIC BLOOD PRESSURE: 64 MMHG

## 2025-09-03 DIAGNOSIS — G47.33 SEVERE OBSTRUCTIVE SLEEP APNEA: Primary | ICD-10-CM

## 2025-09-03 DIAGNOSIS — E66.01 OBESITY, MORBID, BMI 40.0-49.9 (HCC): ICD-10-CM

## 2025-09-03 DIAGNOSIS — Z71.89 ENCOUNTER FOR BIPAP USE COUNSELING: ICD-10-CM

## 2025-09-03 DIAGNOSIS — I10 PRIMARY HYPERTENSION: ICD-10-CM

## 2025-09-03 PROCEDURE — G8417 CALC BMI ABV UP PARAM F/U: HCPCS | Performed by: NURSE PRACTITIONER

## 2025-09-03 PROCEDURE — 99214 OFFICE O/P EST MOD 30 MIN: CPT | Performed by: NURSE PRACTITIONER

## 2025-09-03 PROCEDURE — 3074F SYST BP LT 130 MM HG: CPT | Performed by: NURSE PRACTITIONER

## 2025-09-03 PROCEDURE — 3078F DIAST BP <80 MM HG: CPT | Performed by: NURSE PRACTITIONER

## 2025-09-03 PROCEDURE — 3017F COLORECTAL CA SCREEN DOC REV: CPT | Performed by: NURSE PRACTITIONER

## 2025-09-03 PROCEDURE — G8427 DOCREV CUR MEDS BY ELIG CLIN: HCPCS | Performed by: NURSE PRACTITIONER

## 2025-09-03 PROCEDURE — 4004F PT TOBACCO SCREEN RCVD TLK: CPT | Performed by: NURSE PRACTITIONER

## 2025-09-03 ASSESSMENT — SLEEP AND FATIGUE QUESTIONNAIRES
HOW LIKELY ARE YOU TO NOD OFF OR FALL ASLEEP WHILE LYING DOWN TO REST IN THE AFTERNOON WHEN CIRCUMSTANCES PERMIT: SLIGHT CHANCE OF DOZING
ESS TOTAL SCORE: 1
HOW LIKELY ARE YOU TO NOD OFF OR FALL ASLEEP WHILE WATCHING TV: WOULD NEVER DOZE
HOW LIKELY ARE YOU TO NOD OFF OR FALL ASLEEP WHILE SITTING AND TALKING TO SOMEONE: WOULD NEVER DOZE
HOW LIKELY ARE YOU TO NOD OFF OR FALL ASLEEP WHEN YOU ARE A PASSENGER IN A CAR FOR AN HOUR WITHOUT A BREAK: WOULD NEVER DOZE
HOW LIKELY ARE YOU TO NOD OFF OR FALL ASLEEP WHILE SITTING QUIETLY AFTER LUNCH WITHOUT ALCOHOL: WOULD NEVER DOZE
HOW LIKELY ARE YOU TO NOD OFF OR FALL ASLEEP WHILE SITTING INACTIVE IN A PUBLIC PLACE: WOULD NEVER DOZE
HOW LIKELY ARE YOU TO NOD OFF OR FALL ASLEEP IN A CAR, WHILE STOPPED FOR A FEW MINUTES IN TRAFFIC: WOULD NEVER DOZE
HOW LIKELY ARE YOU TO NOD OFF OR FALL ASLEEP WHILE SITTING AND READING: WOULD NEVER DOZE